# Patient Record
Sex: FEMALE | Race: WHITE | NOT HISPANIC OR LATINO | Employment: UNEMPLOYED | URBAN - METROPOLITAN AREA
[De-identification: names, ages, dates, MRNs, and addresses within clinical notes are randomized per-mention and may not be internally consistent; named-entity substitution may affect disease eponyms.]

---

## 2019-03-27 ENCOUNTER — TELEPHONE (OUTPATIENT)
Dept: SCHEDULING | Facility: IMAGING CENTER | Age: 24
End: 2019-03-27

## 2019-06-03 ENCOUNTER — HOSPITAL ENCOUNTER (OUTPATIENT)
Facility: MEDICAL CENTER | Age: 24
End: 2019-06-03
Attending: OBSTETRICS & GYNECOLOGY
Payer: COMMERCIAL

## 2019-06-03 ENCOUNTER — GYNECOLOGY VISIT (OUTPATIENT)
Dept: OBGYN | Facility: CLINIC | Age: 24
End: 2019-06-03
Payer: COMMERCIAL

## 2019-06-03 VITALS
HEIGHT: 66 IN | SYSTOLIC BLOOD PRESSURE: 124 MMHG | DIASTOLIC BLOOD PRESSURE: 80 MMHG | BODY MASS INDEX: 39.7 KG/M2 | WEIGHT: 247 LBS

## 2019-06-03 DIAGNOSIS — Z30.09 ENCOUNTER FOR OTHER GENERAL COUNSELING AND ADVICE ON CONTRACEPTION: ICD-10-CM

## 2019-06-03 DIAGNOSIS — Z12.4 CERVICAL CANCER SCREENING: ICD-10-CM

## 2019-06-03 PROCEDURE — 99203 OFFICE O/P NEW LOW 30 MIN: CPT | Performed by: OBSTETRICS & GYNECOLOGY

## 2019-06-03 PROCEDURE — 87491 CHLMYD TRACH DNA AMP PROBE: CPT

## 2019-06-03 PROCEDURE — 88175 CYTOPATH C/V AUTO FLUID REDO: CPT

## 2019-06-03 PROCEDURE — 87591 N.GONORRHOEAE DNA AMP PROB: CPT

## 2019-06-03 RX ORDER — VENLAFAXINE 50 MG/1
150 TABLET ORAL DAILY
COMMUNITY
End: 2019-06-27

## 2019-06-03 RX ORDER — ALPRAZOLAM 0.5 MG/1
0.5 TABLET ORAL NIGHTLY PRN
COMMUNITY
End: 2019-12-08

## 2019-06-03 NOTE — PROGRESS NOTES
Pt here for Mirena removal consult  Mirena was placed 2015  # [phone]  Pt would like to discuss having Nexplanon placed.

## 2019-06-03 NOTE — PROGRESS NOTES
GYN Consult    CC/reason for consult: wants Mirena removed and Nexplanon inserted    HPI: Vanessa Sanchez is a 24 y.o.  presents today because she would like her Mirena IUD removed and a Nexplanon placed.  She had her Mirena placed in .  She states she would like the Nexplanon instead because the Mirena insertion was very painful for her.  She is due for a Pap smear.  No history of abnormal Pap smears.  No history of STDs.  Her cycles have been much lighter with the Mirena.      ROS:  constitutional: denies fevers, general concerns  CV: denies chest pain, palpitations, edema  Resp: denies shortness of breath, cough  GI: denies abd pain, N/V, diarrhea/constipation, blood in stool  : denies irregular vaginal bleeding, discharge, pain, denies urinary complaints  Neuro: denies hx of migraines w/ aura  Endo: denies significant weight changes, temperature intolerance, denies hotflashes/nightsweats  Heme/lymph: denies easy bleeding/bruising, denies swollen glands  Psych: denies concerns about mood, denies SI  Allergy: denies concerns      OB History   No data available       Past Medical History:   Diagnosis Date   • Anxiety    • Asthma    • Borderline personality disorder (HCC)    • Depression    • Urinary tract infection        Past Surgical History:   Procedure Laterality Date   • TONSILLECTOMY AND ADENOIDECTOMY         Medications:   Current Outpatient Prescriptions Ordered in [facility]   Medication Sig Dispense Refill   • venlafaxine (EFFEXOR) 50 MG tablet Take 150 mg by mouth every day.     • ALPRAZolam (XANAX) 0.5 MG Tab Take 0.5 mg by mouth at bedtime as needed for Sleep.     • levothyroxine (SYNTHROID) 100 MCG TABS Take 100 mcg by mouth every day.     • Levonorgest-Eth Estrad -Day (SEASONIQUE) 0.15-0.03 &0.01 MG TABS Take  by mouth every 24 hours.       No current [facility]-ordered facility-administered medications on file.        Allergies: Patient has no known allergies.    Social History  "    Social History   • Marital status: Single     Spouse name: N/A   • Number of children: N/A   • Years of education: N/A     Social History Main Topics   • Smoking status: Never Smoker   • Smokeless tobacco: Never Used      Comment: occass   • Alcohol use No      Comment: occass   • Drug use: Yes     Types: Marijuana      Comment: last smoked yesterday   • Sexual activity: Yes     Partners: Male     Birth control/ protection: IUD     Other Topics Concern   • Not on file     Social History Narrative   • No narrative on file       Family History   Problem Relation Age of Onset   • Diabetes Mother    • Stroke Mother    • Other Mother         fibromyalgia   • No Known Problems Father    • Diabetes Maternal Grandmother    • Hyperlipidemia Maternal Grandmother    • No Known Problems Sister      Denies hx of GI/GYN/breast cancers    Physical Exam:  /80   Ht 1.676 m (5' 6\")   Wt 112 kg (247 lb)   LMP 05/25/2019   BMI 39.87 kg/m²   gen: AAO, NAD, affect appropriate  Neck: non-tender, no masses, no thyromegaly/nodules appreciated  CV: RRR; no LE edema  Resp: Symmetric non labored breathing, CTAB  Abd: soft, NT, ND, no masses, no organomegaly, no hernias  : NEFG, normal urethral meatus, normal anus/perineum, normal vagina and cervix.  IUD strings not visualized. Uterus midline, anteverted, no adnexal masses/tenderness  Skin: warm/dry, no lesions    A/P: 24 y.o. No obstetric history on file. with desire for Nexplanon insertion and Mirena removal.  Today we discussed that risk of Nexplanon includes abnormal uterine bleeding, a failure rate of approximately 1%, and pain at insertion site.  We also discussed the possibility of Nexplanon migration in the arm.  Also, during exam the Mirena IUD strings were not visualized.  We did discuss that we have tools that we can use in the office to attempt to remove it in the office.  However, if we are unable to do in the office we will need to take her to the operating room " for hysteroscopic removal.  I did not order an ultrasound to confirm IUD placement in the uterus today because of her statement that her periods lightened up substantially after IUD insertion.  If IUD is not removed in office, I would recommend an ultrasound to confirm intrauterine placement.  1. Cervical cancer screening  THINPREP RFLX HPV ASCUS W/CTNG   2. Encounter for other general counseling and advice on contraception  REFERRAL TO OB/GYN

## 2019-06-04 DIAGNOSIS — Z12.4 CERVICAL CANCER SCREENING: ICD-10-CM

## 2019-06-06 LAB
C TRACH DNA GENITAL QL NAA+PROBE: NEGATIVE
CYTOLOGY REG CYTOL: NORMAL
N GONORRHOEA DNA GENITAL QL NAA+PROBE: NEGATIVE
SPECIMEN SOURCE: NORMAL

## 2019-06-18 ENCOUNTER — GYNECOLOGY VISIT (OUTPATIENT)
Dept: OBGYN | Facility: CLINIC | Age: 24
End: 2019-06-18
Payer: COMMERCIAL

## 2019-06-18 VITALS
HEIGHT: 66 IN | BODY MASS INDEX: 39.53 KG/M2 | WEIGHT: 246 LBS | DIASTOLIC BLOOD PRESSURE: 66 MMHG | SYSTOLIC BLOOD PRESSURE: 116 MMHG

## 2019-06-18 DIAGNOSIS — Z30.432 ENCOUNTER FOR IUD REMOVAL: ICD-10-CM

## 2019-06-18 DIAGNOSIS — Z30.017 NEXPLANON INSERTION: ICD-10-CM

## 2019-06-18 DIAGNOSIS — N88.2 CERVICAL STENOSIS (UTERINE CERVIX): ICD-10-CM

## 2019-06-18 PROCEDURE — 11981 INSERTION DRUG DLVR IMPLANT: CPT | Mod: 59 | Performed by: OBSTETRICS & GYNECOLOGY

## 2019-06-18 PROCEDURE — 58301 REMOVE INTRAUTERINE DEVICE: CPT | Performed by: OBSTETRICS & GYNECOLOGY

## 2019-06-18 RX ORDER — LORAZEPAM 1 MG/1
0.5 TABLET ORAL
Qty: 2 TAB | Refills: 0 | Status: SHIPPED | OUTPATIENT
Start: 2019-06-18 | End: 2019-06-19

## 2019-06-18 RX ORDER — MISOPROSTOL 200 UG/1
400 TABLET ORAL ONCE
Qty: 2 TAB | Refills: 0 | Status: SHIPPED | OUTPATIENT
Start: 2019-06-18 | End: 2019-06-18

## 2019-06-18 NOTE — LETTER
June 18, 2019    To Whom it may concern:         Vanessa Puckett Daniel was seeing today 06/18/2019 for a doctor's appointment.  Please excuse her absence.      Thank you,          Josette Smalls D.O.

## 2019-06-18 NOTE — LETTER
June 18, 2019      To Whom in may concern:      Vanessa Sanchez was seen today 06/18/2019 for a doctor's appointment.   Please excuse her absence.    Thank you,          Josette Smalls D.O.

## 2019-06-18 NOTE — PROCEDURES
Procedure:   She was counseled regarding risks and benefits of Nexplanon including irregular bleeding and method failure. She was also counseled regarding risk of placement including bleeding, infection, scaring and difficult removal. After her questions were answered to her satisfaction, procedural consent was signed.     She was placed in a supine position. The (left) arm was gently adducted.   Area over bicipital groove 8-10cm proximal to medial epicondyl was prepped with alcohol swab. The area was then injected with 3 cc 1% plain lidocaine. The medial surface of the arm was then prepped with betadine x 3. The Nexplanon (lot #[id number]) was then placed in the usual fashion without difficulty. The device was easily palpable. The incision site was made hemostatic with pressure and then dressed with a band aid and gauze wrap. She tolerated this well without complication.    Attempted IUD removal:    Bimanual exam performed revealing anteverted uterus.  Speculum placed in the vagina and cervix visualized. IUD strings not able to be seen. TUVS performed which visualized IUD within uterine cavity.  Brush and alligator clamps were used to sweep strings out of cervical canal with no success.  Tenaculum placed with significant patient discomfort experienced.  Patient desired to proceed.  Further attempts to grasp strings were not successful and patient desired cessation of procedure.      Plan: IUD was visualized to be intrauterine on transvaginal ultrasound performed today.  Patient desires to proceed with another attempt at removal in the office prior to going to the OR for removal.  I think this is reasonable but we will plan to give patient preoperative medications.  I have instructed her to take 800 mg of ibuprofen about an hour prior to the procedure as well as half tab of Ativan 30 minutes prior to procedure.  I have written a prescription for her to have Ativan which she will take half milligrams 30 minutes prior to  the procedure and then bring the other tabs with her so she can take more at the time of procedure if necessary.  We will also plan for paracervical block and I have given a prescription for misoprostol and instructed her to take 400 mcg 4 hours prior to procedure.  She will schedule a return appointment for IUD removal in 1 to 2 weeks.

## 2019-06-18 NOTE — PROGRESS NOTES
GYN visit for IUD Removal & Nexplanon Insertion  LMP: 06/17/2019  Negative UPT today 06/18/2019, performed by jessica Donahue (MA)  WT: 246 lb  BP: 116/66  Good # [phone]

## 2019-06-24 ENCOUNTER — GYNECOLOGY VISIT (OUTPATIENT)
Dept: OBGYN | Facility: CLINIC | Age: 24
End: 2019-06-24
Payer: COMMERCIAL

## 2019-06-24 VITALS — SYSTOLIC BLOOD PRESSURE: 118 MMHG | BODY MASS INDEX: 39.87 KG/M2 | WEIGHT: 247 LBS | DIASTOLIC BLOOD PRESSURE: 70 MMHG

## 2019-06-24 DIAGNOSIS — T83.32XD INTRAUTERINE CONTRACEPTIVE DEVICE THREADS LOST, SUBSEQUENT ENCOUNTER: ICD-10-CM

## 2019-06-24 NOTE — PROCEDURES
Procedure note-Mirena IUD removal;    Patient is to have Mirena IUD placed at [facility] 4.5 years    Patient had Nexplanon placed approximately 1 week ago    Patient had attempted removal of IUD which was unsuccessful approximately 1 week ago    Informed consent obtained-discussed risk of uterine perforation and infection    Bimanual exam reveals anteverted uterus    Vaginal speculum placed    Betadine prep to cervix and vagina    Single-tooth tenaculum placed on the anterior lip of the cervix    Uterus sounded to 7.5 cm in anterior    Endocervical brush placed into the endocervix to try to retrieve IUD strings but unsuccessful    Naomi forceps placed into the endometrial cavity attempted to retrieve IUD but unsuccessful    Alligator forceps placed into the endometrial cavity to attempt to remove IUD but also unsuccessful.    Transvaginal ultrasound performed by myself and read by myself reveals IUD present at the fundus the uterus does not appear to be protruding into the myometrium    Plan; needs D&C in the operating room-referral placed

## 2019-06-27 ENCOUNTER — OFFICE VISIT (OUTPATIENT)
Dept: MEDICAL GROUP | Facility: MEDICAL CENTER | Age: 24
End: 2019-06-27
Attending: FAMILY MEDICINE
Payer: COMMERCIAL

## 2019-06-27 ENCOUNTER — HOSPITAL ENCOUNTER (OUTPATIENT)
Facility: MEDICAL CENTER | Age: 24
End: 2019-06-27
Attending: INTERNAL MEDICINE
Payer: COMMERCIAL

## 2019-06-27 VITALS
RESPIRATION RATE: 16 BRPM | DIASTOLIC BLOOD PRESSURE: 90 MMHG | WEIGHT: 244 LBS | HEART RATE: 60 BPM | SYSTOLIC BLOOD PRESSURE: 130 MMHG | HEIGHT: 66 IN | BODY MASS INDEX: 39.21 KG/M2 | OXYGEN SATURATION: 98 % | TEMPERATURE: 98 F

## 2019-06-27 DIAGNOSIS — E03.9 HYPOTHYROIDISM, UNSPECIFIED TYPE: ICD-10-CM

## 2019-06-27 DIAGNOSIS — F90.9 ATTENTION DEFICIT HYPERACTIVITY DISORDER (ADHD), UNSPECIFIED ADHD TYPE: ICD-10-CM

## 2019-06-27 DIAGNOSIS — F60.3 BORDERLINE PERSONALITY DISORDER (HCC): ICD-10-CM

## 2019-06-27 DIAGNOSIS — L30.1 DYSHIDROTIC DERMATITIS: ICD-10-CM

## 2019-06-27 DIAGNOSIS — F39 MOOD DISORDER (HCC): ICD-10-CM

## 2019-06-27 PROCEDURE — 99202 OFFICE O/P NEW SF 15 MIN: CPT | Performed by: FAMILY MEDICINE

## 2019-06-27 PROCEDURE — 99204 OFFICE O/P NEW MOD 45 MIN: CPT | Performed by: FAMILY MEDICINE

## 2019-06-27 PROCEDURE — 80307 DRUG TEST PRSMV CHEM ANLYZR: CPT

## 2019-06-27 RX ORDER — BETAMETHASONE DIPROPIONATE 0.5 MG/G
1 CREAM TOPICAL 2 TIMES DAILY
Qty: 1 TUBE | Refills: 0 | Status: SHIPPED | OUTPATIENT
Start: 2019-06-27 | End: 2019-11-26

## 2019-06-27 RX ORDER — VENLAFAXINE HYDROCHLORIDE 150 MG/1
150 CAPSULE, EXTENDED RELEASE ORAL DAILY
Qty: 30 CAP | Refills: 3 | Status: SHIPPED | OUTPATIENT
Start: 2019-06-27 | End: 2020-06-15 | Stop reason: SDUPTHER

## 2019-06-27 RX ORDER — METHYLPHENIDATE HYDROCHLORIDE 10 MG/1
10 TABLET ORAL DAILY
Qty: 30 TAB | Refills: 0 | Status: SHIPPED | OUTPATIENT
Start: 2019-06-27 | End: 2019-07-27

## 2019-06-27 ASSESSMENT — ENCOUNTER SYMPTOMS
SPUTUM PRODUCTION: 0
DEPRESSION: 1
ORTHOPNEA: 0
NAUSEA: 0
CHILLS: 0
PALPITATIONS: 0
FEVER: 0
MUSCULOSKELETAL NEGATIVE: 1
VOMITING: 0
INSOMNIA: 0
NEUROLOGICAL NEGATIVE: 1
NERVOUS/ANXIOUS: 1
COUGH: 0
ABDOMINAL PAIN: 0
SHORTNESS OF BREATH: 0
HALLUCINATIONS: 0

## 2019-06-27 ASSESSMENT — LIFESTYLE VARIABLES: SUBSTANCE_ABUSE: 0

## 2019-06-27 NOTE — PROGRESS NOTES
Subjective:      Vanessa Sanchez is a 24 y.o. female who presents with [facility]            Patient 24-year-old female here to establish with the clinic today.  She has a past history of mood disorder, borderline personality disorder, ADHD, hypothyroidism, BMI greater than 31, and dyshidrotic eczema.    She had been seeing a psychiatrist but recently her psychiatry office has closed down and the doctors moved out of town.  She states that since that happened she has not been able to have her medications regularly filled.  She has a history of mood disorder with anxiety primarily and panic attacks, borderline personality disorder and had been in the process of being worked up for adult ADHD.  Today she was asked to fill out a Balwinder scale which suggest that she may actually have ADHD.  She had been on Ritalin 10 mg once daily which she felt helped her concentrate better in school.  We will have her continue to take her Ritalin as previously directed.  We will also refill her Effexor as well as refer her back to psychiatry for additional assistance in management of her multiple conditions.  She is not having any urges to harm himself or others.  She has been advised if she has any sudden worsening of her current symptoms or the urge to harm her self or others she should go to the emergency room for further assistance and management.    She states that she had been on thyroid medication in the past but is no longer taking it.  She has not had her thyroid hormone checked since.  Will reorder a TSH and T4 level to reassess her thyroid function.  We will continue to follow.    She also has a history of asthma that had been previously diagnosed as exercise-induced and has an inhaler which she has not been using.  She states that occasionally when she rides her bike she may have an attack but then she uses her inhaler and things resolved.  She is no longer smoking marijuana since she did see that it did worsen her  "asthma.  We will have her continue to use her inhaler as needed and will continue to follow.    She also has skin rash that she describes as blisters on the tips of her fingers and toes that tend to occur randomly usually in warmer weather.  She states that after these blisters pop her skin gets very dry and starts to crack.  We will have her use a steroid cream over the affected areas were no longer than a week at a time.  After using the steroid cream she has been advised to use a very strong moisturizer to soften her skin around her fingers and toes.  If this does not work a referral to dermatology will be made for a further assessment of her skin rash.     Her past surgical history includes a tonsillectomy and adenoidectomy.    Her family medical history is significant for diabetes.    She is currently a student.    She denies smoking tobacco, drinks alcohol occasionally, and is a former marijuana user.        Review of Systems   Constitutional: Negative for chills and fever.   HENT: Negative for hearing loss and tinnitus.    Respiratory: Negative for cough, sputum production and shortness of breath.    Cardiovascular: Negative for chest pain, palpitations and orthopnea.   Gastrointestinal: Negative for abdominal pain, nausea and vomiting.   Musculoskeletal: Negative.    Skin: Positive for itching and rash.   Neurological: Negative.    Psychiatric/Behavioral: Positive for depression. Negative for hallucinations, substance abuse and suicidal ideas. The patient is nervous/anxious. The patient does not have insomnia.           Objective:     /90 (BP Location: Left arm, Patient Position: Sitting)   Pulse 60   Temp 36.7 °C (98 °F)   Resp 16   Ht 1.676 m (5' 6\")   Wt 110.7 kg (244 lb)   LMP 06/17/2019 (Exact Date)   SpO2 98%   BMI 39.38 kg/m²      Physical Exam   Constitutional: She is oriented to person, place, and time.   BMI 39.98   HENT:   Head: Normocephalic and atraumatic.   Nose: Nose normal. "   Mouth/Throat: Oropharynx is clear and moist.   Eyes: Pupils are equal, round, and reactive to light. Conjunctivae and EOM are normal.   Neck: Normal range of motion. Neck supple. No thyromegaly present.   Cardiovascular: Normal rate, regular rhythm and normal heart sounds.  Exam reveals no friction rub.    No murmur heard.  Pulmonary/Chest: Effort normal and breath sounds normal. No respiratory distress. She has no wheezes. She has no rales.   Abdominal: Soft. Bowel sounds are normal. She exhibits no distension. There is no tenderness.   Lymphadenopathy:     She has no cervical adenopathy.   Neurological: She is alert and oriented to person, place, and time.   Skin: Skin is warm and dry.   Psychiatric: She has a normal mood and affect. Her behavior is normal.   Nursing note and vitals reviewed.              Assessment/Plan:     1. Mood disorder (HCC)  We will have her continue to use her medications as previously directed by her mental health provider.  She has been to be referred to psychiatry for additional assistance in medical management.  She has any sudden worsening of her current symptoms she has been advised to go to the emergency room for additional assistance and management.  - REFERRAL TO PSYCHIATRY  - Comp Metabolic Panel; Future  - VITAMIN D,25 HYDROXY; Future    2. Borderline personality disorder (HCC)  See above plan.  - REFERRAL TO PSYCHIATRY    3. BMI 31.0-31.9,adult  We will have her continue to watch her diet and exercise as tolerated.  - Patient identified as having weight management issue.  Appropriate orders and counseling given.  - Comp Metabolic Panel; Future  - CBC WITH DIFFERENTIAL; Future    4. Hypothyroidism, unspecified type  We will recheck her TSH and T4 levels.  We will continue to follow.  - TSH WITH REFLEX TO FT4; Future  - CBC WITH DIFFERENTIAL; Future  - VITAMIN D,25 HYDROXY; Future    5. Attention deficit hyperactivity disorder (ADHD), unspecified ADHD type  Balwinder scale was  given for patient to fill out.  Ritalin 10 mg daily has been given to patient to start using as directed.  We will continue to follow.  - methylphenidate (RITALIN) 10 MG Tab; Take 1 Tab by mouth every day for 30 days.  Dispense: 30 Tab; Refill: 0    6. Dyshidrotic dermatitis  We will have her try a steroid ointment on her fingers to help treat the rash.  She will also use moisturizers to continue moisturizing her skin.  We will continue to follow.  - betamethasone dipropionate (DIPROLENE) 0.05 % Cream; Apply 1 Application to affected area(s) 2 times a day.  Dispense: 1 Tube; Refill: 0

## 2019-06-28 DIAGNOSIS — Z79.899 CHRONIC PRESCRIPTION BENZODIAZEPINE USE: ICD-10-CM

## 2019-06-28 DIAGNOSIS — F90.9 ATTENTION DEFICIT HYPERACTIVITY DISORDER (ADHD), UNSPECIFIED ADHD TYPE: ICD-10-CM

## 2019-06-30 LAB
AMPHET CTO UR CFM-MCNC: NEGATIVE NG/ML
BARBITURATES CTO UR CFM-MCNC: NEGATIVE NG/ML
BENZODIAZ CTO UR CFM-MCNC: NEGATIVE NG/ML
BUPRENORPHINE UR-MCNC: NEGATIVE NG/ML
CANNABINOIDS CTO UR CFM-MCNC: POSITIVE NG/ML
CARISOPRODOL UR-MCNC: NEGATIVE NG/ML
COCAINE CTO UR CFM-MCNC: NEGATIVE NG/ML
DRUG SCREEN COMMENT UR-IMP: NORMAL
ETHYL GLUCURONIDE UR QL SCN: NEGATIVE NG/ML
FENTANYL UR-MCNC: NEGATIVE NG/ML
MEPERIDINE CTO UR SCN-MCNC: NEGATIVE NG/ML
METHADONE CTO UR CFM-MCNC: NEGATIVE NG/ML
OPIATES UR QL SCN: NEGATIVE NG/ML
OXYCDOXYM URSCRN 2005102: NEGATIVE NG/ML
PCP CTO UR CFM-MCNC: NEGATIVE NG/ML
PROPOXYPH CTO UR CFM-MCNC: NEGATIVE NG/ML
TAPENTADOL UR-MCNC: NEGATIVE NG/ML
TRAMADOL CTO UR SCN-MCNC: NEGATIVE NG/ML
ZOLPIDEM UR-MCNC: NEGATIVE NG/ML

## 2019-07-05 LAB — THC UR CFM-MCNC: 352 NG/ML

## 2019-07-23 ENCOUNTER — GYNECOLOGY VISIT (OUTPATIENT)
Dept: OBGYN | Facility: CLINIC | Age: 24
End: 2019-07-23
Payer: COMMERCIAL

## 2019-07-23 VITALS
BODY MASS INDEX: 39.05 KG/M2 | SYSTOLIC BLOOD PRESSURE: 122 MMHG | DIASTOLIC BLOOD PRESSURE: 72 MMHG | HEIGHT: 66 IN | WEIGHT: 243 LBS

## 2019-07-23 DIAGNOSIS — T83.32XA INTRAUTERINE CONTRACEPTIVE DEVICE THREADS LOST, INITIAL ENCOUNTER: ICD-10-CM

## 2019-07-23 DIAGNOSIS — Z53.8 ATTEMPTED IUD REMOVAL, UNSUCCESSFUL: ICD-10-CM

## 2019-07-23 DIAGNOSIS — Z97.5 ATTEMPTED IUD REMOVAL, UNSUCCESSFUL: ICD-10-CM

## 2019-07-23 NOTE — PROGRESS NOTES
GYN Pre-Op    CC: retained IUD      HPI: Vanessa Sanchez is a 24 y.o. G0 here or preop for hysteroscopic removal of retained Mirena IUD.  Patient has had 2 attempts at removal here in the office without success.  Has had ultrasounds confirming intrauterine location.  Declines additional attempt at ultrasound-guided removal today given discomfort with 2 prior removal attempts.    Patient has a Nexplanon in place for active contraception.    ROS:  constitutional: denies fevers, general concerns  CV: denies chest pain, palpitations, edema  Resp: denies shortness of breath, cough  GI: denies abd pain, N/V, diarrhea/constipatin, blood in stoolo  : denies irregular vaginal bleeding, discharge, pain, denies urinary complaints  Neuro: denies HAs, numbness/weakness  Endo: denies significant weight changes, irregular menses, temperature intolerance, denies hotflashes/nightsweats  Heme/lymph: denies hx of blood transfusion, easy bleeding/bruising, denies swollen glands  Psych: hx of depression; reports well controlled, no SI  Allergy: reports seasonal allergies    OB History    Para Term  AB Living   0 0 0 0 0 0   SAB TAB Ectopic Molar Multiple Live Births   0 0 0 0 0 0             GYN Hx:   Denies hx of STIs  Hx of HPV on pap, cleared; last normal 2019    Past Medical History:   Diagnosis Date   • Anxiety    • Asthma    • Attention deficit hyperactivity disorder (ADHD) 2019   • Borderline personality disorder (HCC)    • Depression    • Hypothyroidism 2019   • Sleep apnea        Past Surgical History:   Procedure Laterality Date   • TONSILLECTOMY AND ADENOIDECTOMY     nasal surgery (turbinates in nose reduced)    Medications:   vanlafaxine  Ritalin  nexplanon  Uses JAH machine at night    Allergies: Patient has no known allergies.    Social History     Social History Main Topics   • Smoking status: Never Smoker   • Smokeless tobacco: Never Used      Comment: occass   • Alcohol use No      " Comment: occass   • Drug use: Yes     Types: Marijuana      Comment: last smoked yesterday   Every day smokes weed      Family History   Problem Relation Age of Onset   • Diabetes Mother    • Stroke Mother    • Other Mother         fibromyalgia   • No Known Problems Father    • Diabetes Maternal Grandmother    • Hyperlipidemia Maternal Grandmother    • No Known Problems Sister    [facility]GG[facility] breast cancer    Physical Exam:  /72 (BP Location: Right arm, Patient Position: Sitting)   Ht 1.676 m (5' 6\")   Wt 110.2 kg (243 lb)   BMI 39.22 kg/m²   gen: AAO, NAD, affect appropriate  CV: RRR; no LE edema  resp: ctab  abd: soft, NT, ND, no masses, no organomegaly, no hernias  : deferred  Skin: warm/dry, no lesions    A/P: 24 y.o. G0 with retained Mirena IUD status post failed attempt at removal in the office previously  1. Intrauterine contraceptive device threads lost, initial encounter     2. Attempted IUD removal, unsuccessful     Plan for hysteroscopy, IUD removal in the operating room.  Discussed may attempt removal in the operating room with anesthesia prior to actual hysteroscopy as sometimes we are able to remove it prior to inserting the camera when patients are relaxed.    Hx of hypothyroidism: not on meds.  Advised to get TSH drawn (last check normal) asap to ensure her thyroid function is relatively normal prior to surgery.    I discussed w/ pt risks of surgery including pain, bleeding, infection, risk of uterine perforation.  Discussed risk of damage to surrounding structures including bladder/rectum.  Pt confirms she is accepting of blood transfusion in case of emergency.  Reviewed risks of transfusion including transfusion reaction (fever, damage to heart/lungs/kidneys), risk of exposure to infectious disease including HIV and hepatitis.  All questions answered.  Consent to be signed on day of surgery.    To have nothing to eat after midnight day of surgery.  Currently schedule his first case of the day.  " Aware of checking time 05 30.  Discussed in general to avoid NSAIDs the week before surgery, although she is a very little bit low risk of bleeding during her procedure so this is less essential for her.    Discussed typical recovery, plan for same day discharge.    Patient start school 2 days after procedure.  Discussed she should be fine to do this, may have some mild cramping for which she can take over-the-counter NSAIDs.  Patient strongly desires to move up of surgical date if possible.  Reports that she would be available even on the next day basis of a slot did become available.      Natividad Bonds MD  [facility], Women's Health

## 2019-07-23 NOTE — NON-PROVIDER
Pre op visit. Surgery scheduled for 8/19/19.  nexplanon placed 6/2019  Phone   Pharmacy verified  C/o cramping

## 2019-07-29 ENCOUNTER — ANESTHESIA (OUTPATIENT)
Dept: SURGERY | Facility: MEDICAL CENTER | Age: 24
End: 2019-07-29
Payer: COMMERCIAL

## 2019-07-29 ENCOUNTER — HOSPITAL ENCOUNTER (OUTPATIENT)
Facility: MEDICAL CENTER | Age: 24
End: 2019-07-29
Attending: OBSTETRICS & GYNECOLOGY | Admitting: OBSTETRICS & GYNECOLOGY
Payer: COMMERCIAL

## 2019-07-29 ENCOUNTER — ANESTHESIA EVENT (OUTPATIENT)
Dept: SURGERY | Facility: MEDICAL CENTER | Age: 24
End: 2019-07-29
Payer: COMMERCIAL

## 2019-07-29 VITALS
DIASTOLIC BLOOD PRESSURE: 90 MMHG | SYSTOLIC BLOOD PRESSURE: 138 MMHG | WEIGHT: 240.74 LBS | BODY MASS INDEX: 38.69 KG/M2 | HEIGHT: 66 IN | OXYGEN SATURATION: 96 % | HEART RATE: 66 BPM | TEMPERATURE: 97.5 F | RESPIRATION RATE: 14 BRPM

## 2019-07-29 LAB
B-HCG FREE SERPL-ACNC: <5 MIU/ML
IHCGL IHCGL: NEGATIVE MIU/ML

## 2019-07-29 PROCEDURE — 160009 HCHG ANES TIME/MIN: Performed by: OBSTETRICS & GYNECOLOGY

## 2019-07-29 PROCEDURE — 700111 HCHG RX REV CODE 636 W/ 250 OVERRIDE (IP): Performed by: ANESTHESIOLOGY

## 2019-07-29 PROCEDURE — 700105 HCHG RX REV CODE 258: Performed by: OBSTETRICS & GYNECOLOGY

## 2019-07-29 PROCEDURE — 160046 HCHG PACU - 1ST 60 MINS PHASE II: Performed by: OBSTETRICS & GYNECOLOGY

## 2019-07-29 PROCEDURE — 160039 HCHG SURGERY MINUTES - EA ADDL 1 MIN LEVEL 3: Performed by: OBSTETRICS & GYNECOLOGY

## 2019-07-29 PROCEDURE — 700101 HCHG RX REV CODE 250: Performed by: OBSTETRICS & GYNECOLOGY

## 2019-07-29 PROCEDURE — 160035 HCHG PACU - 1ST 60 MINS PHASE I: Performed by: OBSTETRICS & GYNECOLOGY

## 2019-07-29 PROCEDURE — 700102 HCHG RX REV CODE 250 W/ 637 OVERRIDE(OP): Performed by: ANESTHESIOLOGY

## 2019-07-29 PROCEDURE — 160025 RECOVERY II MINUTES (STATS): Performed by: OBSTETRICS & GYNECOLOGY

## 2019-07-29 PROCEDURE — 58562 HYSTEROSCOPY REMOVE FB: CPT | Performed by: OBSTETRICS & GYNECOLOGY

## 2019-07-29 PROCEDURE — 502587 HCHG PACK, D&C: Performed by: OBSTETRICS & GYNECOLOGY

## 2019-07-29 PROCEDURE — 160048 HCHG OR STATISTICAL LEVEL 1-5: Performed by: OBSTETRICS & GYNECOLOGY

## 2019-07-29 PROCEDURE — 700101 HCHG RX REV CODE 250: Performed by: ANESTHESIOLOGY

## 2019-07-29 PROCEDURE — 160002 HCHG RECOVERY MINUTES (STAT): Performed by: OBSTETRICS & GYNECOLOGY

## 2019-07-29 PROCEDURE — A9270 NON-COVERED ITEM OR SERVICE: HCPCS | Performed by: ANESTHESIOLOGY

## 2019-07-29 PROCEDURE — 84702 CHORIONIC GONADOTROPIN TEST: CPT

## 2019-07-29 PROCEDURE — 160028 HCHG SURGERY MINUTES - 1ST 30 MINS LEVEL 3: Performed by: OBSTETRICS & GYNECOLOGY

## 2019-07-29 RX ORDER — HYDROMORPHONE HYDROCHLORIDE 1 MG/ML
0.1 INJECTION, SOLUTION INTRAMUSCULAR; INTRAVENOUS; SUBCUTANEOUS
Status: DISCONTINUED | OUTPATIENT
Start: 2019-07-29 | End: 2019-07-29 | Stop reason: HOSPADM

## 2019-07-29 RX ORDER — OXYCODONE HCL 5 MG/5 ML
10 SOLUTION, ORAL ORAL
Status: COMPLETED | OUTPATIENT
Start: 2019-07-29 | End: 2019-07-29

## 2019-07-29 RX ORDER — OXYCODONE HCL 5 MG/5 ML
5 SOLUTION, ORAL ORAL
Status: COMPLETED | OUTPATIENT
Start: 2019-07-29 | End: 2019-07-29

## 2019-07-29 RX ORDER — ONDANSETRON 2 MG/ML
4 INJECTION INTRAMUSCULAR; INTRAVENOUS
Status: DISCONTINUED | OUTPATIENT
Start: 2019-07-29 | End: 2019-07-29 | Stop reason: HOSPADM

## 2019-07-29 RX ORDER — SODIUM CHLORIDE, SODIUM LACTATE, POTASSIUM CHLORIDE, CALCIUM CHLORIDE 600; 310; 30; 20 MG/100ML; MG/100ML; MG/100ML; MG/100ML
INJECTION, SOLUTION INTRAVENOUS CONTINUOUS
Status: DISCONTINUED | OUTPATIENT
Start: 2019-07-29 | End: 2019-07-29 | Stop reason: HOSPADM

## 2019-07-29 RX ORDER — SODIUM CHLORIDE, SODIUM LACTATE, POTASSIUM CHLORIDE, CALCIUM CHLORIDE 600; 310; 30; 20 MG/100ML; MG/100ML; MG/100ML; MG/100ML
INJECTION, SOLUTION INTRAVENOUS CONTINUOUS
Status: DISCONTINUED | OUTPATIENT
Start: 2019-07-29 | End: 2019-07-29

## 2019-07-29 RX ORDER — HYDROMORPHONE HYDROCHLORIDE 1 MG/ML
0.4 INJECTION, SOLUTION INTRAMUSCULAR; INTRAVENOUS; SUBCUTANEOUS
Status: DISCONTINUED | OUTPATIENT
Start: 2019-07-29 | End: 2019-07-29 | Stop reason: HOSPADM

## 2019-07-29 RX ORDER — METHYLPHENIDATE HYDROCHLORIDE 5 MG/1
10 TABLET ORAL 2 TIMES DAILY
COMMUNITY
End: 2019-12-08

## 2019-07-29 RX ORDER — HYDROMORPHONE HYDROCHLORIDE 1 MG/ML
0.2 INJECTION, SOLUTION INTRAMUSCULAR; INTRAVENOUS; SUBCUTANEOUS
Status: DISCONTINUED | OUTPATIENT
Start: 2019-07-29 | End: 2019-07-29 | Stop reason: HOSPADM

## 2019-07-29 RX ORDER — HALOPERIDOL 5 MG/ML
1 INJECTION INTRAMUSCULAR
Status: DISCONTINUED | OUTPATIENT
Start: 2019-07-29 | End: 2019-07-29 | Stop reason: HOSPADM

## 2019-07-29 RX ORDER — DIPHENHYDRAMINE HYDROCHLORIDE 50 MG/ML
12.5 INJECTION INTRAMUSCULAR; INTRAVENOUS
Status: DISCONTINUED | OUTPATIENT
Start: 2019-07-29 | End: 2019-07-29 | Stop reason: HOSPADM

## 2019-07-29 RX ORDER — MEPERIDINE HYDROCHLORIDE 25 MG/ML
12.5 INJECTION INTRAMUSCULAR; INTRAVENOUS; SUBCUTANEOUS
Status: DISCONTINUED | OUTPATIENT
Start: 2019-07-29 | End: 2019-07-29 | Stop reason: HOSPADM

## 2019-07-29 RX ADMIN — FENTANYL CITRATE 50 MCG: 50 INJECTION, SOLUTION INTRAMUSCULAR; INTRAVENOUS at 12:27

## 2019-07-29 RX ADMIN — OXYCODONE HYDROCHLORIDE 10 MG: 5 SOLUTION ORAL at 13:41

## 2019-07-29 RX ADMIN — FENTANYL CITRATE 25 MCG: 50 INJECTION, SOLUTION INTRAMUSCULAR; INTRAVENOUS at 12:59

## 2019-07-29 RX ADMIN — FENTANYL CITRATE 25 MCG: 50 INJECTION, SOLUTION INTRAMUSCULAR; INTRAVENOUS at 12:45

## 2019-07-29 RX ADMIN — FENTANYL CITRATE 25 MCG: 0.05 INJECTION, SOLUTION INTRAMUSCULAR; INTRAVENOUS at 13:59

## 2019-07-29 RX ADMIN — SODIUM CHLORIDE, POTASSIUM CHLORIDE, SODIUM LACTATE AND CALCIUM CHLORIDE: 600; 310; 30; 20 INJECTION, SOLUTION INTRAVENOUS at 12:27

## 2019-07-29 RX ADMIN — MIDAZOLAM HYDROCHLORIDE 2 MG: 1 INJECTION, SOLUTION INTRAMUSCULAR; INTRAVENOUS at 12:27

## 2019-07-29 RX ADMIN — FENTANYL CITRATE 50 MCG: 50 INJECTION, SOLUTION INTRAMUSCULAR; INTRAVENOUS at 12:37

## 2019-07-29 RX ADMIN — FENTANYL CITRATE 25 MCG: 50 INJECTION, SOLUTION INTRAMUSCULAR; INTRAVENOUS at 12:47

## 2019-07-29 RX ADMIN — PROPOFOL 180 MG: 10 INJECTION, EMULSION INTRAVENOUS at 12:37

## 2019-07-29 RX ADMIN — SODIUM CHLORIDE, POTASSIUM CHLORIDE, SODIUM LACTATE AND CALCIUM CHLORIDE: 600; 310; 30; 20 INJECTION, SOLUTION INTRAVENOUS at 12:00

## 2019-07-29 RX ADMIN — FENTANYL CITRATE 25 MCG: 0.05 INJECTION, SOLUTION INTRAMUSCULAR; INTRAVENOUS at 02:00

## 2019-07-29 RX ADMIN — FENTANYL CITRATE 50 MCG: 50 INJECTION, SOLUTION INTRAMUSCULAR; INTRAVENOUS at 12:40

## 2019-07-29 RX ADMIN — FENTANYL CITRATE 25 MCG: 0.05 INJECTION, SOLUTION INTRAMUSCULAR; INTRAVENOUS at 13:52

## 2019-07-29 RX ADMIN — FENTANYL CITRATE 25 MCG: 0.05 INJECTION, SOLUTION INTRAMUSCULAR; INTRAVENOUS at 13:40

## 2019-07-29 RX ADMIN — FENTANYL CITRATE 25 MCG: 50 INJECTION, SOLUTION INTRAMUSCULAR; INTRAVENOUS at 13:04

## 2019-07-29 RX ADMIN — FENTANYL CITRATE 25 MCG: 0.05 INJECTION, SOLUTION INTRAMUSCULAR; INTRAVENOUS at 14:07

## 2019-07-29 ASSESSMENT — PAIN SCALES - GENERAL: PAIN_LEVEL: 1

## 2019-07-29 NOTE — ANESTHESIA POSTPROCEDURE EVALUATION
Patient: Vanessa Sanchez    Procedure Summary     Date:  07/29/19 Room / Location:  [facility] ROOM 21 / SURGERY SAME DAY [facility]    Anesthesia Start:  1227 Anesthesia Stop:  1329    Procedures:       REMOVAL, INTRAUTERINE DEVICE, HYSTEROSCOPY (Uterus)      DILATION AND CURETTAGE (N/A Uterus) Diagnosis:  (INTRAUTERINE CONTRACEPTIVE DEVICE THREADS LOST, SUBSEQUENT ENCOUNTER)    Surgeon:  Jamil Rick M.D. Responsible Provider:  Antwan Curtis M.D.    Anesthesia Type:  general ASA Status:  2          Final Anesthesia Type: general  Last vitals  BP   Blood Pressure: 138/90    Temp   36.9 °C (98.5 °F)    Pulse   Pulse: 75, Heart Rate (Monitored): 75   Resp   19    SpO2   94 %      Anesthesia Post Evaluation    Patient location during evaluation: PACU  Patient participation: complete - patient participated  Level of consciousness: awake and alert  Pain score: 1    Airway patency: patent  Anesthetic complications: no  Cardiovascular status: hemodynamically stable  Respiratory status: acceptable  Hydration status: euvolemic    PONV: none           Nurse Pain Score: 0 (NPRS)

## 2019-07-29 NOTE — ANESTHESIA TIME REPORT
Anesthesia Start and Stop Event Times     Date Time Event    7/29/2019 1227 Anesthesia Start     1329 Anesthesia Stop        Responsible Staff  07/29/19    Name Role Begin End    Antwan Curtis M.D. Anesth 1227 1329        Preop Diagnosis (Free Text):  Pre-op Diagnosis     INTRAUTERINE CONTRACEPTIVE DEVICE THREADS LOST, SUBSEQUENT ENCOUNTER        Preop Diagnosis (Codes):  Diagnosis Information     Diagnosis Code(s):         Post op Diagnosis  IUD strings lost      Premium Reason  Non-Premium    Comments:

## 2019-07-29 NOTE — DISCHARGE INSTRUCTIONS
ACTIVITY: Rest and take it easy for the first 24 hours.  A responsible adult is recommended to remain with you during that time.  It is normal to feel sleepy.  We encourage you to not do anything that requires balance, judgment or coordination.    MILD FLU-LIKE SYMPTOMS ARE NORMAL. YOU MAY EXPERIENCE GENERALIZED MUSCLE ACHES, THROAT IRRITATION, HEADACHE AND/OR SOME NAUSEA.    FOR 24 HOURS DO NOT:  Drive, operate machinery or run household appliances.  Drink beer or alcoholic beverages.   Make important decisions or sign legal documents.    SPECIAL INSTRUCTIONS:     Please return or call if:     Pain uncontrolled by pain medications   Fever greater than 100.4 degrees   Heavy vaginal bleeding   Or if you have any other questions or concerns     Please follow-up in clinic in 2 to 3 weeks    See handout for further instruction    DIET: To avoid nausea, slowly advance diet as tolerated, avoiding spicy or greasy foods for the first day.  Add more substantial food to your diet according to your physician's instructions.  Babies can be fed formula or breast milk as soon as they are hungry.  INCREASE FLUIDS AND FIBER TO AVOID CONSTIPATION.    SURGICAL DRESSING/BATHING: Do not submerge in water until cleared by provider.     FOLLOW-UP APPOINTMENT:  A follow-up appointment should be arranged with your doctor in 2-3 weeks; call to schedule.    You should CALL YOUR PHYSICIAN if you develop:  Fever greater than 101 degrees F.  Pain not relieved by medication, or persistent nausea or vomiting.  Excessive bleeding (blood soaking through dressing) or unexpected drainage from the wound.  Extreme redness or swelling around the incision site, drainage of pus or foul smelling drainage.  Inability to urinate or empty your bladder within 8 hours.  Problems with breathing or chest pain.    You should call 911 if you develop problems with breathing or chest pain.  If you are unable to contact your doctor or surgical center, you should go  to the nearest emergency room or urgent care center.  Physician's telephone #: Dr. Rick [phone]    If any questions arise, call your doctor.  If your doctor is not available, please feel free to call the Surgical Center at [phone].  The Center is open Monday through Friday from 7AM to 7PM.  You can also call the HEALTH HOTLINE open 24 hours/day, 7 days/week and speak to a nurse at [phone], or toll free at [phone].    A registered nurse may call you a few days after your surgery to see how you are doing after your procedure.    MEDICATIONS: Resume taking daily medication.  Take prescribed pain medication with food.  If no medication is prescribed, you may take non-aspirin pain medication if needed.  PAIN MEDICATION CAN BE VERY CONSTIPATING.  Take a stool softener or laxative such as senokot, pericolace, or milk of magnesia if needed.    Last pain medication given at 1:41 Oxycodone 10 mg. Ok to take Tylenol anytime once home, do not exceed 4g in 24 hrs. Also ok to alternate with Ibuprofen.     If your physician has prescribed pain medication that includes Acetaminophen (Tylenol), do not take additional Acetaminophen (Tylenol) while taking the prescribed medication.    Depression / Suicide Risk    As you are discharged from this [facility] facility, it is important to learn how to keep safe from harming yourself.    Recognize the warning signs:  · Abrupt changes in personality, positive or negative- including increase in energy   · Giving away possessions  · Change in eating patterns- significant weight changes-  positive or negative  · Change in sleeping patterns- unable to sleep or sleeping all the time   · Unwillingness or inability to communicate  · Depression  · Unusual sadness, discouragement and loneliness  · Talk of wanting to die  · Neglect of personal appearance   · Rebelliousness- reckless behavior  · Withdrawal from people/activities they love  · Confusion- inability to  concentrate     If you or a loved one observes any of these behaviors or has concerns about self-harm, here's what you can do:  · Talk about it- your feelings and reasons for harming yourself  · Remove any means that you might use to hurt yourself (examples: pills, rope, extension cords, firearm)  · Get professional help from the community (Mental Health, Substance Abuse, psychological counseling)  · Do not be alone:Call your Safe Contact- someone whom you trust who will be there for you.  · Call your local CRISIS HOTLINE 027-7773 or 582-494-6093  · Call your local Children's Mobile Crisis Response Team [place] (135) 294-6197 or www.[employer]  · Call the toll free National Suicide Prevention Hotlines   · National Suicide Prevention Lifeline 876-365-ZSAS (5496)  · National Hope Line Network 800-SUICIDE (171-2458)

## 2019-07-29 NOTE — ANESTHESIA PROCEDURE NOTES
Airway  Date/Time: 7/29/2019 12:37 PM  Performed by: STELLA RUIZ  Authorized by: STELLA RUIZ     Location:  OR  Urgency:  Elective  Indications for Airway Management:  Anesthesia  Spontaneous Ventilation: absent    Sedation Level:  Deep  Preoxygenated: Yes    Final Airway Type:  Supraglottic airway  Final Supraglottic Airway:  Standard LMA  SGA Size:  3  Number of Attempts at Approach:  1

## 2019-07-29 NOTE — OR SURGEON
Immediate Post OP Note    PreOp Diagnosis: Retained IUD.  Unsuccessful attempt of removal in the office    PostOp Diagnosis: Same    Procedure(s):  REMOVAL, INTRAUTERINE DEVICE, HYSTEROSCOPY - Wound Class: Clean Contaminated  DILATION AND CURETTAGE - Wound Class: Clean Contaminated    Surgeon(s):  Jamil Rick M.D.    Anesthesiologist/Type of Anesthesia:  Anesthesiologist: Antwan Curtis M.D./General    Surgical Staff:  Circulator: Andre Gonzalez R.N.; Michelle Reynoso R.N.  Scrub Person: Veronica Macdonald    Specimens removed if any:  * No specimens in log *    Estimated Blood Loss: 10 mL    Findings: Retained IUD within the uterine cavity, no evidence of embedment into myometrium.  Normal-appearing endometrial cavity following removal of IUD    Complications: None        7/29/2019 1:29 PM Jamil Rick M.D.

## 2019-07-29 NOTE — ANESTHESIA PREPROCEDURE EVALUATION
Relevant Problems   (+) Hypothyroidism       Physical Exam    Airway    Cardiovascular    Dental    Pulmonary    Abdominal   (+) obese     Neurological              Anesthesia Plan        Plan - general       Airway plan will be LMA        Induction: intravenous    Postoperative Plan: Postoperative administration of opioids is intended.    Pertinent diagnostic labs and testing reviewed    Informed Consent:    Anesthetic plan and risks discussed with patient.    Use of blood products discussed with: patient whom consented to blood products.

## 2019-07-29 NOTE — ANESTHESIA PREPROCEDURE EVALUATION
Relevant Problems   (+) Hypothyroidism       Physical Exam    Airway   Mallampati: II  TM distance: >3 FB  Neck ROM: full       Cardiovascular - normal exam  Rhythm: regular  Rate: normal  (-) murmur     Dental - normal exam         Pulmonary - normal exam  Breath sounds clear to auscultation     Abdominal   Abdomen: soft  Bowel sounds: normal   Neurological - normal exam                 Anesthesia Plan    ASA 2       Plan - general       Airway plan will be LMA        Induction: intravenous          Informed Consent:    Anesthetic plan and risks discussed with patient.

## 2019-07-29 NOTE — OP REPORT
DATE OF SERVICE: July 29, 2019     PREOPERATIVE DIAGNOSES:  1.  Retained IUD  2.  Failed attempted removal in office     POSTOPERATIVE DIAGNOSES:  1.  Same    PROCEDURE PERFORMED: Hysteroscopy with removal of retained IUD    SURGEON:  Jamil Rick MD     ASSISTANT: none     ANESTHESIA:  Spinal.     ANESTHESIOLOGIST: Antwan Curtis MD     SPECIMEN: IUD     ESTIMATED BLOOD LOSS: 10 mL     FINDINGS: Retained IUD visualized within the endometrial cavity.  Normal-appearing endometrial cavity following removal of IUD     COMPLICATIONS:  None.     PROCEDURE:  After appropriate consents were obtained, the patient was taken to the operating room where spinal  anesthesia was applied without complications.  The patient was then prepped and draped in the usual sterile manner.       Cervix was then visualized with the help of a speculum.  The anterior lip of the cervix was then grasped with a single-tooth tenaculum.  The cervix was then dilated until an operative hysteroscope was able to be introduced into the endometrial cavity.  The IUD was visualized within the cavity.  It did not appear to be embedded into the myometrium.  Using a hysteroscopic grasper, the IUD was then grasped and removed.    The endometrial cavity was again visualized, no evidence of any abnormality was noted.    Procedure was then terminated at this time     Sponge, instrument, and lap counts were correct x2.  Patient tolerated the procedure well and went to recovery room in stable condition.        ____________________________________  Jamil Rick MD

## 2019-07-29 NOTE — ANESTHESIA PREPROCEDURE EVALUATION
Relevant Problems   (+) Hypothyroidism       Physical Exam    Anesthesia Plan    ASA 2                       Informed Consent:

## 2019-07-29 NOTE — OR NURSING
1329 Pt to PACU from OR. Report from anesthesia and OR RN. LMA in place, maintaining sats on RA. Respirations even and unlabored. VSS.     1335 Pt waking at this time, dc'd LMA. Respirations even and unlabored.     1341 c/o cramping pain 4/10. Medicated with larger oral dose in anticipation of increase in pain and pt request. Medicated with IV pain medication per MAR. Heat pack applied for comfort.    1352 Medicated again for unchanged pain.    1359 Medicated again for 4/10 pain.    1407 Pain improving, still c/o 3/10 cramping pain, medicated per MAR. SO brought to bs.     1414 Pt states pain is tolerable after intervention. No longer needing IV medication, meets phase II criteria. Encouraged pt to void once before discharge, verbalizes understanding.     1437 Pt up to bathroom via gurney, steady gait, able to void w/o difficulty. Dressed with SO's assistance.     1450 Discharge orders received. Meets discharge criteria at this time. Tolerable pain. No nausea. Tolerating PO. On RA. All lines and monitors discontinued. Reviewed discharge paperwork with pt and SO. Discussed diet, activity, medications, follow up care and worsening symptoms. No questions at this time. Pt to be discharged to home via private vehicle. Offered hospital escort and wc, pt declined, ambulated out of department with RN, SO, and all belongings.

## 2019-08-19 NOTE — PROGRESS NOTES
GYN Visit:    CC: postop check    HPI: 24 y.o.yo  s/p hysteroscopic IUD removal on 19 with Dr. Rick for retained IUD.    Pt reports doing well.  Minimal pain.  Denies vaginal bleeding or discharge.  No problems with  nexplanon in place for desired contraception.    ROS:   Gen: denies fevers, general concerns  : denies vaginal bleeding, discharge    /76   Wt 111.6 kg (246 lb)   BMI 39.71 kg/m²   Gen: AAO, NAD  : deferred    A/P:24 y.o.yo  s/p hysteroscopic IUD removal on 19 with Dr. Rick  - no signs of postop complications  - discussed irregular bleeding can occur w/ nexplanon    F/u: prn or annually    Natividad Bonds MD  [facility], Women's Health

## 2019-08-20 ENCOUNTER — GYNECOLOGY VISIT (OUTPATIENT)
Dept: OBGYN | Facility: CLINIC | Age: 24
End: 2019-08-20
Payer: COMMERCIAL

## 2019-08-20 VITALS — WEIGHT: 246 LBS | BODY MASS INDEX: 39.71 KG/M2 | SYSTOLIC BLOOD PRESSURE: 120 MMHG | DIASTOLIC BLOOD PRESSURE: 76 MMHG

## 2019-08-20 DIAGNOSIS — Z09 POSTOP CHECK: ICD-10-CM

## 2019-08-20 PROCEDURE — 99212 OFFICE O/P EST SF 10 MIN: CPT | Performed by: OBSTETRICS & GYNECOLOGY

## 2019-08-20 NOTE — NON-PROVIDER
Pt here for s/p hysteroscopy IUD removal  Pt states no complaints  Good#[phone]  Pharmacy verified

## 2019-11-12 ENCOUNTER — TELEPHONE (OUTPATIENT)
Dept: OBGYN | Facility: CLINIC | Age: 24
End: 2019-11-12

## 2019-11-12 NOTE — TELEPHONE ENCOUNTER
Pt called states she is been trying to set up an appt to lexii seen. C/o having VB for a month.    Scheduled on 11/22/19 @ 1130, also would like to be placed on waiting list.      Agreed to plan.

## 2019-11-13 ENCOUNTER — OFFICE VISIT (OUTPATIENT)
Dept: URGENT CARE | Facility: CLINIC | Age: 24
End: 2019-11-13
Payer: COMMERCIAL

## 2019-11-13 VITALS
SYSTOLIC BLOOD PRESSURE: 118 MMHG | HEART RATE: 101 BPM | HEIGHT: 67 IN | DIASTOLIC BLOOD PRESSURE: 92 MMHG | OXYGEN SATURATION: 96 % | BODY MASS INDEX: 37.51 KG/M2 | WEIGHT: 239 LBS | RESPIRATION RATE: 16 BRPM | TEMPERATURE: 97.7 F

## 2019-11-13 DIAGNOSIS — R09.81 NASAL CONGESTION WITH RHINORRHEA: ICD-10-CM

## 2019-11-13 DIAGNOSIS — J34.89 NASAL CONGESTION WITH RHINORRHEA: ICD-10-CM

## 2019-11-13 DIAGNOSIS — R05.9 COUGH: ICD-10-CM

## 2019-11-13 DIAGNOSIS — R09.82 PND (POST-NASAL DRIP): ICD-10-CM

## 2019-11-13 DIAGNOSIS — J06.9 URI WITH COUGH AND CONGESTION: ICD-10-CM

## 2019-11-13 DIAGNOSIS — R06.2 WHEEZE: ICD-10-CM

## 2019-11-13 PROCEDURE — 99204 OFFICE O/P NEW MOD 45 MIN: CPT | Performed by: NURSE PRACTITIONER

## 2019-11-13 RX ORDER — FLUTICASONE PROPIONATE 50 MCG
2 SPRAY, SUSPENSION (ML) NASAL DAILY
Qty: 1 BOTTLE | Refills: 0 | Status: SHIPPED | OUTPATIENT
Start: 2019-11-13 | End: 2019-11-26

## 2019-11-13 RX ORDER — BUSPIRONE HYDROCHLORIDE 10 MG/1
10 TABLET ORAL 3 TIMES DAILY
Refills: 1 | COMMUNITY
Start: 2019-10-28 | End: 2020-12-03

## 2019-11-13 RX ORDER — AZITHROMYCIN 250 MG/1
TABLET, FILM COATED ORAL
Qty: 6 TAB | Refills: 0 | Status: SHIPPED | OUTPATIENT
Start: 2019-11-13 | End: 2019-11-26

## 2019-11-13 RX ORDER — ALBUTEROL SULFATE 90 UG/1
2 AEROSOL, METERED RESPIRATORY (INHALATION) EVERY 6 HOURS PRN
Qty: 8.5 G | Refills: 0 | Status: SHIPPED | OUTPATIENT
Start: 2019-11-13 | End: 2019-11-26

## 2019-11-13 RX ORDER — ALBUTEROL SULFATE 90 UG/1
2 AEROSOL, METERED RESPIRATORY (INHALATION) EVERY 6 HOURS PRN
COMMUNITY
End: 2019-12-08

## 2019-11-13 ASSESSMENT — ENCOUNTER SYMPTOMS
SENSORY CHANGE: 0
DIARRHEA: 0
COUGH: 1
DIZZINESS: 0
CONSTIPATION: 0
SHORTNESS OF BREATH: 1
TINGLING: 0
MYALGIAS: 0
HEADACHES: 0
SPUTUM PRODUCTION: 0
ABDOMINAL PAIN: 0
PALPITATIONS: 0
WEAKNESS: 0
VOMITING: 0
SORE THROAT: 1
CHILLS: 0
BACK PAIN: 0
FEVER: 0
ORTHOPNEA: 0
SINUS PAIN: 0
WHEEZING: 1
NAUSEA: 0

## 2019-11-14 NOTE — PROGRESS NOTES
Subjective:      Vanessa Sanchez is a 24 y.o. female who presents with Cough (RT ear pain, head ache, sinus, chest congestion x1 month got better then sx returned.  )            HPI  Cough: dry, nasal congestion, PND, sore throat, chest congestion, intermittent wheeze. Will get wheeze with cough/illness or exercise induced. Dayquil/Nyquil. Able to sleep at night.     PMH:  has a past medical history of Anxiety, Asthma, Asthma, Attention deficit hyperactivity disorder (ADHD) (6/27/2019), Borderline personality disorder (HCC) (2014), Depression, Hypothyroidism (6/27/2019), Sleep apnea, Sleep apnea, and Snoring. She also has no past medical history of Addisons disease ([facility]), Adrenal disorder ([facility]), Allergy, Anemia, Arrhythmia, Arthritis, Blood transfusion without reported diagnosis, Cancer ([facility]), Cataract, CHF (congestive heart failure) ([facility]), Clotting disorder ([facility]), COPD (chronic obstructive pulmonary disease) ([facility]), Cushings syndrome ([facility]), Diabetes ([facility]), Diabetic neuropathy ([facility]), GERD (gastroesophageal reflux disease), Glaucoma, Goiter, Head ache, Heart attack ([facility]), Heart murmur, HIV (human immunodeficiency virus infection) ([facility]), Hyperlipidemia, Hypertension, IBD (inflammatory bowel disease), Kidney disease, Meningitis, Migraine, Muscle disorder, Osteoporosis, Parathyroid disorder ([facility]), Pituitary disease ([facility]), Pulmonary emphysema ([facility]), Seizure ([facility]), Sickle cell disease ([facility]), Stroke ([facility]), Substance abuse ([facility]), or Tuberculosis.  MEDS:   Current Outpatient Medications:   •  busPIRone (BUSPAR) 10 MG Tab tablet, TAKE 1 TABLET BY MOUTH TWICE A DAY, Disp: , Rfl: 1  •  albuterol 108 (90 Base) MCG/ACT Aero Soln inhalation aerosol, Inhale 2 Puffs by mouth every 6 hours as needed for Shortness of Breath., Disp: , Rfl:   •  fluticasone (FLONASE) 50 MCG/ACT nasal spray, Spray 2 Sprays in nose every day., Disp: 1 Bottle, Rfl: 0  •  azithromycin (ZITHROMAX) 250 MG Tab, Take 2 tabs by mouth on day 1, then take 1 tab  on days 2-5, Disp: 6 Tab, Rfl: 0  •  albuterol 108 (90 Base) MCG/ACT Aero Soln inhalation aerosol, Inhale 2 Puffs by mouth every 6 hours as needed for Shortness of Breath., Disp: 8.5 g, Rfl: 0  •  venlafaxine (EFFEXOR-XR) 150 MG extended-release capsule, Take 1 Cap by mouth every day., Disp: 30 Cap, Rfl: 3  •  methylphenidate (RITALIN) 5 MG Tab, Take 10 mg by mouth 2 times a day., Disp: , Rfl:   •  betamethasone dipropionate (DIPROLENE) 0.05 % Cream, Apply 1 Application to affected area(s) 2 times a day. (Patient not taking: Reported on 7/23/2019), Disp: 1 Tube, Rfl: 0  •  ALPRAZolam (XANAX) 0.5 MG Tab, Take 0.5 mg by mouth at bedtime as needed for Sleep., Disp: , Rfl:   ALLERGIES:   Allergies   Allergen Reactions   • Seasonal      SURGHX:   Past Surgical History:   Procedure Laterality Date   • PB REMOVE INTRAUTERINE DEVICE  7/29/2019    Procedure: REMOVAL, INTRAUTERINE DEVICE, HYSTEROSCOPY;  Surgeon: Jamil Rick M.D.;  Location: SURGERY SAME DAY [facility] ORS;  Service: Obstetrics   • DILATION AND CURETTAGE N/A 7/29/2019    Procedure: DILATION AND CURETTAGE;  Surgeon: Jamil Rick M.D.;  Location: SURGERY SAME DAY [facility] ORS;  Service: Obstetrics   • TONSILLECTOMY AND ADENOIDECTOMY  2013   • OTHER      Turbinate reduction     SOCHX:  reports that she has never smoked. She has never used smokeless tobacco. She reports current alcohol use. She reports current drug use. Drugs: Marijuana and Inhaled.  FH: Family history was reviewed, no pertinent findings to report    Review of Systems   Constitutional: Positive for malaise/fatigue. Negative for chills and fever.   HENT: Positive for congestion, ear pain and sore throat. Negative for sinus pain.    Respiratory: Positive for cough, shortness of breath and wheezing. Negative for sputum production.    Cardiovascular: Negative for chest pain, palpitations and orthopnea.   Gastrointestinal: Negative for abdominal pain, constipation, diarrhea, nausea and  "vomiting.   Musculoskeletal: Negative for back pain and myalgias.   Skin: Negative for itching and rash.   Neurological: Negative for dizziness, tingling, sensory change, weakness and headaches.   Endo/Heme/Allergies: Negative for environmental allergies.   All other systems reviewed and are negative.         Objective:     /92   Pulse (!) 101   Temp 36.5 °C (97.7 °F)   Resp 16   Ht 1.689 m (5' 6.5\")   Wt 108.4 kg (239 lb)   SpO2 96%   BMI 38.00 kg/m²      Physical Exam  Vitals signs reviewed.   Constitutional:       General: She is awake. She is not in acute distress.     Appearance: Normal appearance. She is well-developed. She is not ill-appearing, toxic-appearing or diaphoretic.   HENT:      Head: Normocephalic.      Right Ear: Tympanic membrane, ear canal and external ear normal.      Left Ear: Ear canal and external ear normal. A middle ear effusion is present.      Ears:      Comments: Ears: Brook pinnae. Right external auditory canal with occlusion with impacted cerumen. Procedure: Pt then prepped and lavaged by MA.TM pearly gray with normal light reflex bilaterally.         Nose: Mucosal edema and rhinorrhea present.      Mouth/Throat:      Pharynx: Posterior oropharyngeal erythema present.   Eyes:      General:         Right eye: No discharge.         Left eye: No discharge.      Conjunctiva/sclera: Conjunctivae normal.      Pupils: Pupils are equal, round, and reactive to light.   Neck:      Musculoskeletal: Normal range of motion.   Cardiovascular:      Rate and Rhythm: Normal rate and regular rhythm.   Pulmonary:      Effort: Pulmonary effort is normal. No accessory muscle usage or respiratory distress.      Breath sounds: Normal breath sounds.   Abdominal:      General: Bowel sounds are normal. There is no distension.      Palpations: Abdomen is soft.      Tenderness: There is no tenderness. There is no guarding or rebound.   Musculoskeletal: Normal range of motion.   Lymphadenopathy:      " Cervical: No cervical adenopathy.   Skin:     General: Skin is warm and dry.   Neurological:      Mental Status: She is alert and oriented to person, place, and time.   Psychiatric:         Behavior: Behavior is cooperative.                 Assessment/Plan:       1. Cough    2. Nasal congestion with rhinorrhea    - fluticasone (FLONASE) 50 MCG/ACT nasal spray; Spray 2 Sprays in nose every day.  Dispense: 1 Bottle; Refill: 0    3. PND (post-nasal drip)    - fluticasone (FLONASE) 50 MCG/ACT nasal spray; Spray 2 Sprays in nose every day.  Dispense: 1 Bottle; Refill: 0    4. URI with cough and congestion    - azithromycin (ZITHROMAX) 250 MG Tab; Take 2 tabs by mouth on day 1, then take 1 tab on days 2-5  Dispense: 6 Tab; Refill: 0    5. Wheeze    - albuterol 108 (90 Base) MCG/ACT Aero Soln inhalation aerosol; Inhale 2 Puffs by mouth every 6 hours as needed for Shortness of Breath.  Dispense: 8.5 g; Refill: 0    Increase water intake  May use Ibuprofen/Tylenol prn for fever or body aches  Get rest  May use daily longer acting antihistamine prn  May use saline nasal spray prn to flush any nasal congestion   Use inhaler prn for SOB with cough  May use OTC cough suppressant medications like Robitussin/Delsym prn  Monitor for fevers, productive cough, SOB, CP, chest tightness- need re-evaluation

## 2019-11-22 ENCOUNTER — GYNECOLOGY VISIT (OUTPATIENT)
Dept: OBGYN | Facility: CLINIC | Age: 24
End: 2019-11-22
Payer: COMMERCIAL

## 2019-11-22 VITALS — DIASTOLIC BLOOD PRESSURE: 68 MMHG | WEIGHT: 239 LBS | SYSTOLIC BLOOD PRESSURE: 108 MMHG | BODY MASS INDEX: 38 KG/M2

## 2019-11-22 DIAGNOSIS — Z97.5 BREAKTHROUGH BLEEDING ON NEXPLANON: ICD-10-CM

## 2019-11-22 DIAGNOSIS — N92.1 BREAKTHROUGH BLEEDING ON NEXPLANON: ICD-10-CM

## 2019-11-22 PROCEDURE — 99213 OFFICE O/P EST LOW 20 MIN: CPT | Performed by: OBSTETRICS & GYNECOLOGY

## 2019-11-22 RX ORDER — ESTRADIOL 1 MG/1
0.5 TABLET ORAL DAILY
Qty: 10 TAB | Refills: 1 | Status: SHIPPED | OUTPATIENT
Start: 2019-11-22 | End: 2019-12-08

## 2019-11-22 NOTE — NON-PROVIDER
Pt here to discuss vaginal bleeding.    Pt states she is experiencing vaginal bleeding since October. Denies any pain.   Pt had Nexplanon inserted 6/2019  Good# [phone]  LMP: 10/12/19

## 2019-11-25 ENCOUNTER — OFFICE VISIT (OUTPATIENT)
Dept: URGENT CARE | Facility: CLINIC | Age: 24
End: 2019-11-25
Payer: COMMERCIAL

## 2019-11-25 VITALS
TEMPERATURE: 98.1 F | HEIGHT: 66 IN | HEART RATE: 74 BPM | OXYGEN SATURATION: 94 % | SYSTOLIC BLOOD PRESSURE: 124 MMHG | BODY MASS INDEX: 38.57 KG/M2 | WEIGHT: 240 LBS | RESPIRATION RATE: 12 BRPM | DIASTOLIC BLOOD PRESSURE: 82 MMHG

## 2019-11-25 DIAGNOSIS — S91.215A LACERATION OF LESSER TOE OF LEFT FOOT WITHOUT FOREIGN BODY WITH DAMAGE TO NAIL, INITIAL ENCOUNTER: ICD-10-CM

## 2019-11-25 PROCEDURE — 99213 OFFICE O/P EST LOW 20 MIN: CPT | Performed by: NURSE PRACTITIONER

## 2019-11-25 ASSESSMENT — ENCOUNTER SYMPTOMS
MUSCULOSKELETAL NEGATIVE: 1
FOCAL WEAKNESS: 0
CONSTITUTIONAL NEGATIVE: 1
RESPIRATORY NEGATIVE: 1
NEUROLOGICAL NEGATIVE: 1
TINGLING: 0
SENSORY CHANGE: 0

## 2019-11-25 NOTE — PROGRESS NOTES
Subjective:     Vanessa Sanchez is a 24 y.o. female who presents for Toe Injury (dropped a knife on LT 4th diget toe x yesturday around 1 am )       Toe Injury   This is a new problem. The problem has been gradually worsening.     Patient reports that about 11 hours ago she accidentally dropped a serrated clean kitchen knife on the top of her left foot.  Has a laceration at the top of her left fourth toe.  Reports having had bleeding which is now coming under control.  Denies weakness, loss of range of motion, or sensory changes.    Tdap received 2017 per chart review.    PMH:  has a past medical history of Anxiety, Asthma, Asthma, Attention deficit hyperactivity disorder (ADHD) (6/27/2019), Borderline personality disorder (HCC) (2014), Depression, Hypothyroidism (6/27/2019), Sleep apnea, Sleep apnea, and Snoring. She also has no past medical history of Addisons disease ([facility]), Adrenal disorder ([facility]), Allergy, Anemia, Arrhythmia, Arthritis, Blood transfusion without reported diagnosis, Cancer ([facility]), Cataract, CHF (congestive heart failure) ([facility]), Clotting disorder ([facility]), COPD (chronic obstructive pulmonary disease) ([facility]), Cushings syndrome ([facility]), Diabetes ([facility]), Diabetic neuropathy ([facility]), GERD (gastroesophageal reflux disease), Glaucoma, Goiter, Head ache, Heart attack ([facility]), Heart murmur, HIV (human immunodeficiency virus infection) ([facility]), Hyperlipidemia, Hypertension, IBD (inflammatory bowel disease), Kidney disease, Meningitis, Migraine, Muscle disorder, Osteoporosis, Parathyroid disorder ([facility]), Pituitary disease ([facility]), Pulmonary emphysema ([facility]), Seizure ([facility]), Sickle cell disease ([facility]), Stroke ([facility]), Substance abuse ([facility]), or Tuberculosis.    MEDS:   Current Outpatient Medications:   •  estradiol (ESTRACE) 1 MG Tab, Take 0.5 Tabs by mouth every day., Disp: 10 Tab, Rfl: 1  •  busPIRone (BUSPAR) 10 MG Tab tablet, TAKE 1 TABLET BY MOUTH TWICE A DAY, Disp: , Rfl: 1  •  venlafaxine (EFFEXOR-XR) 150 MG  extended-release capsule, Take 1 Cap by mouth every day., Disp: 30 Cap, Rfl: 3  •  albuterol 108 (90 Base) MCG/ACT Aero Soln inhalation aerosol, Inhale 2 Puffs by mouth every 6 hours as needed for Shortness of Breath., Disp: , Rfl:   •  fluticasone (FLONASE) 50 MCG/ACT nasal spray, Spray 2 Sprays in nose every day. (Patient not taking: Reported on 11/25/2019), Disp: 1 Bottle, Rfl: 0  •  azithromycin (ZITHROMAX) 250 MG Tab, Take 2 tabs by mouth on day 1, then take 1 tab on days 2-5 (Patient not taking: Reported on 11/25/2019), Disp: 6 Tab, Rfl: 0  •  albuterol 108 (90 Base) MCG/ACT Aero Soln inhalation aerosol, Inhale 2 Puffs by mouth every 6 hours as needed for Shortness of Breath. (Patient not taking: Reported on 11/25/2019), Disp: 8.5 g, Rfl: 0  •  methylphenidate (RITALIN) 5 MG Tab, Take 10 mg by mouth 2 times a day., Disp: , Rfl:   •  betamethasone dipropionate (DIPROLENE) 0.05 % Cream, Apply 1 Application to affected area(s) 2 times a day. (Patient not taking: Reported on 7/23/2019), Disp: 1 Tube, Rfl: 0  •  ALPRAZolam (XANAX) 0.5 MG Tab, Take 0.5 mg by mouth at bedtime as needed for Sleep., Disp: , Rfl:     ALLERGIES:   Allergies   Allergen Reactions   • Seasonal      SURGHX:   Past Surgical History:   Procedure Laterality Date   • PB REMOVE INTRAUTERINE DEVICE  7/29/2019    Procedure: REMOVAL, INTRAUTERINE DEVICE, HYSTEROSCOPY;  Surgeon: Jamil Rick M.D.;  Location: SURGERY SAME DAY [facility];  Service: Obstetrics   • DILATION AND CURETTAGE N/A 7/29/2019    Procedure: DILATION AND CURETTAGE;  Surgeon: Jamil Rick M.D.;  Location: SURGERY SAME DAY [facility];  Service: Obstetrics   • TONSILLECTOMY AND ADENOIDECTOMY  2013   • OTHER      Turbinate reduction     SOCHX:  reports that she has been smoking. She has been smoking about 0.00 packs per day. She has never used smokeless tobacco. She reports current alcohol use. She reports current drug use. Drugs: Marijuana and Inhaled.     FH:  "Reviewed with patient, not pertinent to this visit.    Review of Systems   Constitutional: Negative.    Respiratory: Negative.    Musculoskeletal: Negative.    Skin:        Left fourth toe laceration   Neurological: Negative.  Negative for tingling, sensory change and focal weakness.   All other systems reviewed and are negative.    Objective:     /82   Pulse 74   Temp 36.7 °C (98.1 °F)   Resp 12   Ht 1.676 m (5' 6\")   Wt 108.9 kg (240 lb)   SpO2 94%   BMI 38.74 kg/m²     Physical Exam  Vitals signs reviewed.   Constitutional:       General: She is not in acute distress.     Appearance: She is well-developed. She is not toxic-appearing or diaphoretic.   HENT:      Head: Normocephalic.      Right Ear: External ear normal.      Left Ear: External ear normal.      Nose: Nose normal.   Eyes:      Conjunctiva/sclera: Conjunctivae normal.   Neck:      Musculoskeletal: Normal range of motion.   Cardiovascular:      Rate and Rhythm: Normal rate.      Pulses: Normal pulses.   Pulmonary:      Effort: Pulmonary effort is normal. No respiratory distress.   Musculoskeletal: Normal range of motion.         General: No deformity.      Left foot: Normal range of motion and normal capillary refill. Swelling and laceration (Dorsal L 4th toe involving lateral nail plate and bed, approx 0.5 cm in length, bleeding controlled, underlying soft tissue swelling, NVI distally) present.   Skin:     General: Skin is warm and dry.      Capillary Refill: Capillary refill takes less than 2 seconds.      Coloration: Skin is not pale.   Neurological:      Mental Status: She is alert and oriented to person, place, and time.      Sensory: No sensory deficit.      Coordination: Coordination normal.   Psychiatric:         Behavior: Behavior normal. Behavior is cooperative.          Assessment/Plan:     1. Laceration of lesser toe of left foot without foreign body with damage to nail, initial encounter    Laceration involving nail left " fourth toe.  Collaborated with other urgent care provider.  There is substantial underlying soft tissue swelling preventing approximation of wound edges.  Discussed with patient that closure with sutures cannot be accomplished at this time.  Bleeding is controlled.  Reinforced laceration with 3 Steri-Strips.  Advised on wound care including keeping laceration clean, dry, and protected.  Recommend follow-up for wound check in 2 days.    Discussed close monitoring and supportive measures including increasing fluids and rest as well as OTC symptom management including acetaminophen and/or ibuprofen PRN pain.    Work note provided.    Vital signs stable, afebrile, asymptomatic, no acute distress.    Warning signs reviewed. Strict return/ER precautions advised.    Patient advised to: Return for 1) Symptoms don't improve or worsen, or go to ER, 2) Follow up with primary care in 7-10 days.    Differential diagnosis, natural history, supportive care, and indications for immediate follow-up discussed. All questions answered. Patient agrees with the plan of care.

## 2019-11-25 NOTE — LETTER
November 25, 2019         Patient: Vanessa Sanchez   YOB: 1995   Date of Visit: 11/25/2019           To Whom it May Concern:    Vanessa Sanchez was seen in my clinic on 11/25/2019. The patient may return to work 11/28/2019 or sooner if the patient is feeling better.     If you have any questions or concerns, please don't hesitate to call.        Sincerely,           TIFF Obrien.  Electronically Signed

## 2019-11-26 ENCOUNTER — HOSPITAL ENCOUNTER (EMERGENCY)
Facility: MEDICAL CENTER | Age: 24
End: 2019-11-27
Attending: EMERGENCY MEDICINE
Payer: COMMERCIAL

## 2019-11-26 ENCOUNTER — OFFICE VISIT (OUTPATIENT)
Dept: URGENT CARE | Facility: CLINIC | Age: 24
End: 2019-11-26
Payer: COMMERCIAL

## 2019-11-26 VITALS
BODY MASS INDEX: 38.58 KG/M2 | OXYGEN SATURATION: 94 % | DIASTOLIC BLOOD PRESSURE: 74 MMHG | WEIGHT: 239 LBS | HEART RATE: 123 BPM | SYSTOLIC BLOOD PRESSURE: 126 MMHG | RESPIRATION RATE: 18 BRPM | TEMPERATURE: 100.1 F

## 2019-11-26 DIAGNOSIS — J10.1 INFLUENZA B: ICD-10-CM

## 2019-11-26 DIAGNOSIS — E86.0 DEHYDRATION: ICD-10-CM

## 2019-11-26 DIAGNOSIS — R05.3 PERSISTENT COUGH: ICD-10-CM

## 2019-11-26 DIAGNOSIS — R68.89 FEVER AND OTHER PHYSIOLOGIC DISTURBANCES OF TEMPERATURE REGULATION: ICD-10-CM

## 2019-11-26 DIAGNOSIS — R52 GENERALIZED BODY ACHES: ICD-10-CM

## 2019-11-26 DIAGNOSIS — R11.2 NAUSEA AND VOMITING, INTRACTABILITY OF VOMITING NOT SPECIFIED, UNSPECIFIED VOMITING TYPE: ICD-10-CM

## 2019-11-26 DIAGNOSIS — R11.2 NON-INTRACTABLE VOMITING WITH NAUSEA, UNSPECIFIED VOMITING TYPE: ICD-10-CM

## 2019-11-26 DIAGNOSIS — R68.89 FLU-LIKE SYMPTOMS: ICD-10-CM

## 2019-11-26 LAB
APPEARANCE UR: CLEAR
BILIRUB UR STRIP-MCNC: NORMAL MG/DL
COLOR UR AUTO: NORMAL
GLUCOSE UR STRIP.AUTO-MCNC: NORMAL MG/DL
INT CON NEG: NEGATIVE
INT CON POS: POSITIVE
KETONES UR STRIP.AUTO-MCNC: 160 MG/DL
LEUKOCYTE ESTERASE UR QL STRIP.AUTO: NORMAL
NITRITE UR QL STRIP.AUTO: NORMAL
PH UR STRIP.AUTO: 5.5 [PH] (ref 5–8)
POC URINE PREGNANCY TEST: NORMAL
PROT UR QL STRIP: 30 MG/DL
RBC UR QL AUTO: NORMAL
SP GR UR STRIP.AUTO: 1.03
UROBILINOGEN UR STRIP-MCNC: 0.2 MG/DL

## 2019-11-26 PROCEDURE — 99214 OFFICE O/P EST MOD 30 MIN: CPT | Mod: 25 | Performed by: FAMILY MEDICINE

## 2019-11-26 PROCEDURE — 700105 HCHG RX REV CODE 258: Performed by: EMERGENCY MEDICINE

## 2019-11-26 PROCEDURE — 99284 EMERGENCY DEPT VISIT MOD MDM: CPT

## 2019-11-26 PROCEDURE — 96375 TX/PRO/DX INJ NEW DRUG ADDON: CPT

## 2019-11-26 PROCEDURE — 96374 THER/PROPH/DIAG INJ IV PUSH: CPT

## 2019-11-26 PROCEDURE — 81002 URINALYSIS NONAUTO W/O SCOPE: CPT | Performed by: FAMILY MEDICINE

## 2019-11-26 PROCEDURE — 81025 URINE PREGNANCY TEST: CPT | Performed by: FAMILY MEDICINE

## 2019-11-26 PROCEDURE — 700102 HCHG RX REV CODE 250 W/ 637 OVERRIDE(OP): Performed by: EMERGENCY MEDICINE

## 2019-11-26 PROCEDURE — A9270 NON-COVERED ITEM OR SERVICE: HCPCS | Performed by: EMERGENCY MEDICINE

## 2019-11-26 RX ORDER — OSELTAMIVIR PHOSPHATE 75 MG/1
75 CAPSULE ORAL 2 TIMES DAILY
COMMUNITY
End: 2019-12-08

## 2019-11-26 RX ORDER — KETOROLAC TROMETHAMINE 30 MG/ML
30 INJECTION, SOLUTION INTRAMUSCULAR; INTRAVENOUS ONCE
Status: COMPLETED | OUTPATIENT
Start: 2019-11-27 | End: 2019-11-27

## 2019-11-26 RX ORDER — SODIUM CHLORIDE 9 MG/ML
2000 INJECTION, SOLUTION INTRAVENOUS ONCE
Status: COMPLETED | OUTPATIENT
Start: 2019-11-26 | End: 2019-11-27

## 2019-11-26 RX ORDER — BENZONATATE 100 MG/1
200 CAPSULE ORAL ONCE
Status: COMPLETED | OUTPATIENT
Start: 2019-11-26 | End: 2019-11-26

## 2019-11-26 RX ORDER — ONDANSETRON 4 MG/1
4 TABLET, ORALLY DISINTEGRATING ORAL EVERY 6 HOURS PRN
Qty: 10 TAB | Refills: 0 | Status: SHIPPED | OUTPATIENT
Start: 2019-11-26 | End: 2019-12-08

## 2019-11-26 RX ORDER — ONDANSETRON 2 MG/ML
4 INJECTION INTRAMUSCULAR; INTRAVENOUS ONCE
Status: COMPLETED | OUTPATIENT
Start: 2019-11-27 | End: 2019-11-27

## 2019-11-26 RX ORDER — KETOROLAC TROMETHAMINE 30 MG/ML
30 INJECTION, SOLUTION INTRAMUSCULAR; INTRAVENOUS ONCE
Status: COMPLETED | OUTPATIENT
Start: 2019-11-26 | End: 2019-11-26

## 2019-11-26 RX ORDER — ONDANSETRON 2 MG/ML
4 INJECTION INTRAMUSCULAR; INTRAVENOUS ONCE
Status: COMPLETED | OUTPATIENT
Start: 2019-11-26 | End: 2019-11-26

## 2019-11-26 RX ADMIN — KETOROLAC TROMETHAMINE 30 MG: 30 INJECTION, SOLUTION INTRAMUSCULAR; INTRAVENOUS at 19:46

## 2019-11-26 RX ADMIN — ONDANSETRON 4 MG: 2 INJECTION INTRAMUSCULAR; INTRAVENOUS at 19:47

## 2019-11-26 RX ADMIN — SODIUM CHLORIDE 2000 ML: 9 INJECTION, SOLUTION INTRAVENOUS at 22:51

## 2019-11-26 RX ADMIN — BENZONATATE 200 MG: 100 CAPSULE ORAL at 23:29

## 2019-11-26 ASSESSMENT — ENCOUNTER SYMPTOMS
VOMITING: 1
NAUSEA: 1

## 2019-11-26 NOTE — LETTER
November 26, 2019         Patient: Vanessa Sanchez   YOB: 1995   Date of Visit: 11/26/2019           To Whom it May Concern:    Vanessa Sanchez was seen in my clinic on 11/26/2019. She may return to school/work in 3-5 days.    If you have any questions or concerns, please don't hesitate to call.        Sincerely,           Brad Starr M.D.  Electronically Signed

## 2019-11-27 ENCOUNTER — OFFICE VISIT (OUTPATIENT)
Dept: URGENT CARE | Facility: CLINIC | Age: 24
End: 2019-11-27
Payer: COMMERCIAL

## 2019-11-27 VITALS
RESPIRATION RATE: 16 BRPM | BODY MASS INDEX: 37.54 KG/M2 | HEART RATE: 85 BPM | WEIGHT: 239.2 LBS | TEMPERATURE: 97.7 F | DIASTOLIC BLOOD PRESSURE: 76 MMHG | OXYGEN SATURATION: 95 % | HEIGHT: 67 IN | SYSTOLIC BLOOD PRESSURE: 110 MMHG

## 2019-11-27 VITALS
RESPIRATION RATE: 18 BRPM | BODY MASS INDEX: 37.43 KG/M2 | DIASTOLIC BLOOD PRESSURE: 86 MMHG | TEMPERATURE: 98.4 F | SYSTOLIC BLOOD PRESSURE: 110 MMHG | WEIGHT: 239 LBS | OXYGEN SATURATION: 96 % | HEART RATE: 70 BPM

## 2019-11-27 DIAGNOSIS — S91.215D: ICD-10-CM

## 2019-11-27 PROCEDURE — 700111 HCHG RX REV CODE 636 W/ 250 OVERRIDE (IP): Performed by: EMERGENCY MEDICINE

## 2019-11-27 RX ADMIN — KETOROLAC TROMETHAMINE 30 MG: 30 INJECTION, SOLUTION INTRAMUSCULAR at 00:06

## 2019-11-27 RX ADMIN — ONDANSETRON 4 MG: 2 INJECTION INTRAMUSCULAR; INTRAVENOUS at 00:06

## 2019-11-27 ASSESSMENT — ENCOUNTER SYMPTOMS
EYE DISCHARGE: 0
FEVER: 1
HEADACHES: 0
VOMITING: 1
EYE REDNESS: 0
MYALGIAS: 0
COUGH: 1

## 2019-11-27 NOTE — PROGRESS NOTES
Subjective:      Vanessa Sanchez is a 24 y.o. female who presents with Influenza (pt states she was diagnosed with influenza B today and states that when she got home she started throwing up and feeling really sick and was told to come here)      - This is a pleasant and non toxic appearing 24 y.o. female with c/o being in usual state of health until last night when she developed sudden onset cough/stuffy nose, body aches/malaise, subjective fever. Low appetite today and not able to eat/drink much. Vomited about 6 times, no diarrhea. Went to PCP at [facility] today and says they tested her for flu and was positive for flu B.             ALLERGIES:  Seasonal     PMH:  Past Medical History:   Diagnosis Date   • Anxiety    • Asthma    • Asthma    • Attention deficit hyperactivity disorder (ADHD) 6/27/2019   • Borderline personality disorder (HCC) 2014   • Depression    • Hypothyroidism 6/27/2019   • Sleep apnea    • Sleep apnea     uses c pap   • Snoring         PSH:  Past Surgical History:   Procedure Laterality Date   • PB REMOVE INTRAUTERINE DEVICE  7/29/2019    Procedure: REMOVAL, INTRAUTERINE DEVICE, HYSTEROSCOPY;  Surgeon: Jamil Rick M.D.;  Location: SURGERY SAME DAY [facility];  Service: Obstetrics   • DILATION AND CURETTAGE N/A 7/29/2019    Procedure: DILATION AND CURETTAGE;  Surgeon: Jamil Rick M.D.;  Location: SURGERY SAME DAY [facility];  Service: Obstetrics   • TONSILLECTOMY AND ADENOIDECTOMY  2013   • OTHER      Turbinate reduction       MEDS:    Current Outpatient Medications:   •  Oseltamivir Phosphate (TAMIFLU PO), Take  by mouth., Disp: , Rfl:   •  oseltamivir (TAMIFLU) 75 MG Cap, Take 75 mg by mouth 2 times a day., Disp: , Rfl:   •  estradiol (ESTRACE) 1 MG Tab, Take 0.5 Tabs by mouth every day., Disp: 10 Tab, Rfl: 1  •  busPIRone (BUSPAR) 10 MG Tab tablet, TAKE 1 TABLET BY MOUTH TWICE A DAY, Disp: , Rfl: 1  •  albuterol 108 (90 Base) MCG/ACT Aero Soln inhalation  aerosol, Inhale 2 Puffs by mouth every 6 hours as needed for Shortness of Breath., Disp: , Rfl:   •  methylphenidate (RITALIN) 5 MG Tab, Take 10 mg by mouth 2 times a day., Disp: , Rfl:   •  venlafaxine (EFFEXOR-XR) 150 MG extended-release capsule, Take 1 Cap by mouth every day., Disp: 30 Cap, Rfl: 3  •  ALPRAZolam (XANAX) 0.5 MG Tab, Take 0.5 mg by mouth at bedtime as needed for Sleep., Disp: , Rfl:     ** I have documented what I find to be significant in regards to past medical, social, family and surgical history  in my HPI or under PMH/PSH/FH review section, otherwise it is contributory **           HPI    Review of Systems   Gastrointestinal: Positive for nausea and vomiting.   All other systems reviewed and are negative.         Objective:     /74   Pulse (!) 123   Temp 37.8 °C (100.1 °F)   Resp 18   Wt 108.4 kg (239 lb)   SpO2 94%   BMI 38.58 kg/m²      Physical Exam  Vitals signs and nursing note reviewed.   Constitutional:       General: She is not in acute distress.     Appearance: She is well-developed. She is not diaphoretic.   HENT:      Head: Normocephalic and atraumatic.   Eyes:      Conjunctiva/sclera: Conjunctivae normal.   Cardiovascular:      Heart sounds: Normal heart sounds. No murmur.   Pulmonary:      Effort: Pulmonary effort is normal. No respiratory distress.      Breath sounds: Normal breath sounds.   Skin:     General: Skin is warm and dry.   Neurological:      Mental Status: She is alert.      Motor: No abnormal muscle tone.   Psychiatric:         Judgment: Judgment normal.                 Assessment/Plan:         1. Flu-like symptoms  ondansetron (ZOFRAN) syringe/vial injection 4 mg    ketorolac (TORADOL) injection 30 mg   2. Nausea and vomiting  POCT Urinalysis    POCT Pregnancy   3. Dehydration         * asked patient to go to ER for eval

## 2019-11-27 NOTE — DISCHARGE INSTRUCTIONS
Complete bedrest  Increase fluids, no dairy products for the next several days  Hot tea with lemon and honey  Cool mist humidifier at your bedside  Take your medications as directed with food  Take the nausea medication as needed  Take extra vitamin C and Echinacea to boost your immune system  Tylenol or ibuprofen for fever and/or body aches.  Follow-up with your primary care provider within the week for recheck and return if any problems or worsening.

## 2019-11-27 NOTE — ED TRIAGE NOTES
"Vanessa Sanchez  24 y.o.  female  Chief Complaint   Patient presents with   • Flu Like Symptoms     patient seen earlier today at  and was Dx with + Flu B. non stop cough / body aches and fever. patient was given Tamiflu and tessalon states \" I am sure I vomited that medication \".    • Vomiting     started today prior going to .        "

## 2019-11-27 NOTE — ED NOTES
Assist RN: Pt okay for d/c at this time per ERP. Discharge instructions reviewed with patient. Patient indicates understanding of discharge instructions, follow-up instructions, prescriptions x1, and reasons to return to ER. Patient denies needs or additional questions at this time, no PIV in place at this time. Pt ambulatory without difficulty out of ER in NAD with family.

## 2019-11-27 NOTE — PROGRESS NOTES
Subjective:      Vanessa Sanchez is a 24 y.o. female who presents with Wound Check (on left toe,needs steri-strips reapplied)        Patient presents to clinic for a wound check.  The patient was previously seen x2 days ago for a laceration of her left fourth toe.  The patient states the laceration was closed with Steri-Strips.  The patient is requesting reapplication of the Steri-Strips at this time.  The patient reports no pain or swelling to the injured area.  No redness.  No discharge/drainage.  The patient states the wound continued to bleed after her initial visit, but states this is now improved.  The patient reports an associated fever, but states she was recently diagnosed with influenza and believes her fever is likely related to the influenza virus.  The patient has not taken anything for her current symptoms.    Wound Check   She was originally treated 2 to 3 days ago. Previous treatment included laceration repair. There has been bloody (now improved) discharge from the wound. There is no redness present. There is no swelling present. There is no pain present. She has no difficulty moving the affected extremity or digit.     PMH:  has a past medical history of Anxiety, Asthma, Asthma, Attention deficit hyperactivity disorder (ADHD) (6/27/2019), Borderline personality disorder (HCC) (2014), Depression, Hypothyroidism (6/27/2019), Sleep apnea, Sleep apnea, and Snoring. She also has no past medical history of Addisons disease ([facility]), Adrenal disorder ([facility]), Allergy, Anemia, Arrhythmia, Arthritis, Blood transfusion without reported diagnosis, Cancer ([facility]), Cataract, CHF (congestive heart failure) ([facility]), Clotting disorder ([facility]), COPD (chronic obstructive pulmonary disease) ([facility]), Cushings syndrome ([facility]), Diabetes ([facility]), Diabetic neuropathy ([facility]), GERD (gastroesophageal reflux disease), Glaucoma, Goiter, Head ache, Heart attack ([facility]), Heart murmur, HIV (human immunodeficiency virus infection) ([facility]),  Hyperlipidemia, Hypertension, IBD (inflammatory bowel disease), Kidney disease, Meningitis, Migraine, Muscle disorder, Osteoporosis, Parathyroid disorder (HCC), Pituitary disease (HCC), Pulmonary emphysema (HCC), Seizure (HCC), Sickle cell disease (HCC), Stroke (HCC), Substance abuse (HCC), or Tuberculosis.  MEDS:   Current Outpatient Medications:   •  Oseltamivir Phosphate (TAMIFLU PO), Take  by mouth., Disp: , Rfl:   •  oseltamivir (TAMIFLU) 75 MG Cap, Take 75 mg by mouth 2 times a day., Disp: , Rfl:   •  ondansetron (ZOFRAN ODT) 4 MG TABLET DISPERSIBLE, Take 1 Tab by mouth every 6 hours as needed for Nausea., Disp: 10 Tab, Rfl: 0  •  estradiol (ESTRACE) 1 MG Tab, Take 0.5 Tabs by mouth every day., Disp: 10 Tab, Rfl: 1  •  busPIRone (BUSPAR) 10 MG Tab tablet, TAKE 1 TABLET BY MOUTH TWICE A DAY, Disp: , Rfl: 1  •  albuterol 108 (90 Base) MCG/ACT Aero Soln inhalation aerosol, Inhale 2 Puffs by mouth every 6 hours as needed for Shortness of Breath., Disp: , Rfl:   •  methylphenidate (RITALIN) 5 MG Tab, Take 10 mg by mouth 2 times a day., Disp: , Rfl:   •  venlafaxine (EFFEXOR-XR) 150 MG extended-release capsule, Take 1 Cap by mouth every day., Disp: 30 Cap, Rfl: 3  •  ALPRAZolam (XANAX) 0.5 MG Tab, Take 0.5 mg by mouth at bedtime as needed for Sleep., Disp: , Rfl:   ALLERGIES:   Allergies   Allergen Reactions   • Seasonal      SURGHX:   Past Surgical History:   Procedure Laterality Date   • PB REMOVE INTRAUTERINE DEVICE  7/29/2019    Procedure: REMOVAL, INTRAUTERINE DEVICE, HYSTEROSCOPY;  Surgeon: Jamil Rcik M.D.;  Location: SURGERY SAME DAY [facility];  Service: Obstetrics   • DILATION AND CURETTAGE N/A 7/29/2019    Procedure: DILATION AND CURETTAGE;  Surgeon: Jamil Rick M.D.;  Location: SURGERY SAME DAY [facility];  Service: Obstetrics   • TONSILLECTOMY AND ADENOIDECTOMY  2013   • OTHER      Turbinate reduction     SOCHX:  reports that she has been smoking. She has been smoking about 0.00  packs per day. She has never used smokeless tobacco. She reports current alcohol use. She reports current drug use. Drugs: Marijuana and Inhaled.  FH: Family history was reviewed, no pertinent findings to report      Review of Systems   Constitutional: Positive for fever (secondary to influenza B).   HENT: Positive for congestion (secondary to influenza B).    Eyes: Negative for discharge and redness.   Respiratory: Positive for cough (secondary to influenza B).    Gastrointestinal: Positive for vomiting (secondary to influenza B).   Musculoskeletal: Negative for myalgias.   Skin: Negative for rash.   Neurological: Negative for headaches.          Objective:     /86 (BP Location: Right arm)   Pulse 70   Temp 36.9 °C (98.4 °F) (Temporal)   Resp 18   Wt 108.4 kg (239 lb)   SpO2 96%   BMI 37.43 kg/m²      Physical Exam  Constitutional:       General: She is not in acute distress.     Appearance: Normal appearance. She is well-developed.   HENT:      Head: Normocephalic and atraumatic.      Right Ear: External ear normal.      Left Ear: External ear normal.      Nose: Nose normal.   Eyes:      Extraocular Movements: Extraocular movements intact.      Conjunctiva/sclera: Conjunctivae normal.   Neck:      Musculoskeletal: Normal range of motion and neck supple.   Cardiovascular:      Rate and Rhythm: Normal rate.   Pulmonary:      Effort: Pulmonary effort is normal.   Musculoskeletal:        Feet:       Comments:   Left 4th Toe:  Well-healing laceration to the distal aspect of the left fourth toe with associated damage to the nail.  No tenderness to palpation.  No swelling.  No surrounding erythema.  No increased warmth.  No discharge/drainage.  ROM intact -the patient is able to move her left fourth toe without difficulty.  ROM against resistance intact -no increasing pain with flexion/extension of the left fourth toe against resistance.  Neurovascular intact.   Skin:     General: Skin is warm and dry.    Neurological:      Mental Status: She is alert and oriented to person, place, and time.            Progress:  Wound Care:  Cleansed the area with an alcohol prep pad.  Reapplied Steri-Strips to the patient's wound.     Assessment/Plan:     1. Laceration of lesser toe of left foot without foreign body with damage to nail, subsequent encounter    Differential diagnoses, supportive care, and indications for immediate follow-up discussed with patient.   Instructed to return to clinic or nearest emergency department for any change in condition, further concerns, or worsening of symptoms.    OTC Tylenol or Motrin for fever/discomfort.  Keep laceration clean and dry  Allow wound to be open to the air for at least a portion of the day  Avoid soaking the wound  Monitor for signs of infection  Follow-up in 48 hours for reassessment  Return to clinic or go to the ED if symptoms worsen or fail to improve, or if patient should develop worsening/increasing/persistent pain/tenderness to the injured area, swelling, bruising, redness or warmth to the injured area, discharge/drainage from the wound, decreased range of motion, fever/chills, secondary signs of infection, and/or any concerning symptoms.    Discussed plan with the patient, and she agrees to the above.

## 2019-11-27 NOTE — ED PROVIDER NOTES
"ED Provider Note     Scribed for Annie Miranda D.O. by Sharon Garcia. 11/26/2019, 9:43 PM.     Primary care provider: RITA Brandt  Means of arrival: Walk-in         History obtained from: Patient  History limited by: None    CHIEF COMPLAINT  Chief Complaint   Patient presents with   • Flu Like Symptoms     patient seen earlier today at  and was Dx with + Flu B. non stop cough / body aches and fever. patient was given Tamiflu and tessalon states \" I am sure I vomited that medication \".    • Vomiting     started today prior going to .        HPI  Vanessa Sanchez is a 24 y.o. female who presents to the emergency Department for Influenza B. She states that she presented to Urgent care because of her persistent vomiting. She states that she tested positive for Influenza B. She has associated symptoms of dehydration, generalized abdominal pain, nausea, and vomiting. She states she has not been able to keep any food or water down in the past 24 hours because of her vomiting.  She denies any diarrhea but she has had generalized body aches, shaking chills and fever.  She was given Tessalon Perles and Tamiflu at urgent care but then proceeded to vomit them up so she was told to come here for some IV fluids.  She has had decreased urine output and what she did put out was very concentrated and dark.  She denies any possibility being pregnant.    REVIEW OF SYSTEMS  Pertinent positives include urinary retention, abdominal pain, nausea, and vomiting. Pertinent negatives include no fever.   See HPI for further details. All other systems are negative.    PAST MEDICAL HISTORY  Past Medical History:   Diagnosis Date   • Anxiety    • Asthma    • Asthma    • Attention deficit hyperactivity disorder (ADHD) 6/27/2019   • Borderline personality disorder (HCC) 2014   • Depression    • Hypothyroidism 6/27/2019   • Sleep apnea    • Sleep apnea     uses c pap   • Snoring        FAMILY HISTORY  Family History   Problem " Relation Age of Onset   • Diabetes Mother    • Stroke Mother    • Other Mother         fibromyalgia   • No Known Problems Father    • Diabetes Maternal Grandmother    • Hyperlipidemia Maternal Grandmother    • No Known Problems Sister        SOCIAL HISTORY  Social History     Tobacco Use   • Smoking status: Current Some Day Smoker     Packs/day: 0.00   • Smokeless tobacco: Never Used   • Tobacco comment: occass   Substance Use Topics   • Alcohol use: Yes     Comment: occass   • Drug use: Yes     Types: Marijuana, Inhaled     Comment: e cigarettes, Vaping, and MarijuAnna      Social History     Substance and Sexual Activity   Drug Use Yes   • Types: Marijuana, Inhaled    Comment: e cigarettes, Vaping, and MarijuAnna       SURGICAL HISTORY  Past Surgical History:   Procedure Laterality Date   • PB REMOVE INTRAUTERINE DEVICE  7/29/2019    Procedure: REMOVAL, INTRAUTERINE DEVICE, HYSTEROSCOPY;  Surgeon: Jamil Rick M.D.;  Location: SURGERY SAME DAY [facility];  Service: Obstetrics   • DILATION AND CURETTAGE N/A 7/29/2019    Procedure: DILATION AND CURETTAGE;  Surgeon: Jamil Rick M.D.;  Location: SURGERY SAME DAY [facility];  Service: Obstetrics   • TONSILLECTOMY AND ADENOIDECTOMY  2013   • OTHER      Turbinate reduction       CURRENT MEDICATIONS    Current Facility-Administered Medications:   •  NS infusion 2,000 mL, 2,000 mL, Intravenous, Once, Annie Miranda D.O.    Current Outpatient Medications:   •  Oseltamivir Phosphate (TAMIFLU PO), Take  by mouth., Disp: , Rfl:   •  oseltamivir (TAMIFLU) 75 MG Cap, Take 75 mg by mouth 2 times a day., Disp: , Rfl:   •  estradiol (ESTRACE) 1 MG Tab, Take 0.5 Tabs by mouth every day., Disp: 10 Tab, Rfl: 1  •  busPIRone (BUSPAR) 10 MG Tab tablet, TAKE 1 TABLET BY MOUTH TWICE A DAY, Disp: , Rfl: 1  •  albuterol 108 (90 Base) MCG/ACT Aero Soln inhalation aerosol, Inhale 2 Puffs by mouth every 6 hours as needed for Shortness of Breath., Disp: , Rfl:   •   "methylphenidate (RITALIN) 5 MG Tab, Take 10 mg by mouth 2 times a day., Disp: , Rfl:   •  venlafaxine (EFFEXOR-XR) 150 MG extended-release capsule, Take 1 Cap by mouth every day., Disp: 30 Cap, Rfl: 3  •  ALPRAZolam (XANAX) 0.5 MG Tab, Take 0.5 mg by mouth at bedtime as needed for Sleep., Disp: , Rfl:     ALLERGIES  Allergies   Allergen Reactions   • Seasonal        PHYSICAL EXAM  VITAL SIGNS: /72   Pulse (!) 109   Temp 36.8 °C (98.2 °F) (Temporal)   Resp 14   Ht 1.702 m (5' 7\")   Wt 108.5 kg (239 lb 3.2 oz)   SpO2 92%   BMI 37.46 kg/m²     Constitutional: Patient is well developed, well nourished, pale in moderate distress from her persistent cough and nausea.  HENT: Nasally congested, Normocephalic, atraumatic. Bilateral external auditory canals normal without drainage or cerumen. TM's visualized without erythema. Oropharynx dry without erythema or exudates.  Eyes: PERRL, EOMI, Conjunctiva without erythema or exudates.   Cardiovascular: Tachycardic without murmur  Thorax & Lungs: Clear and equal breath sounds with good excursion. No respiratory distress, no rhonchi, wheezing or rales.  Coarse breath sounds in the upper airways with coughing.  Abdomen: Bowel sounds normal in all four quadrants. Soft with generalized tenderness, no rebound or guarding, no flank tenderness or masses.  Skin: Pale, warm, Dry, No erythema, No rashes.       COURSE & MEDICAL DECISION MAKING  Pertinent Labs & Imaging studies reviewed. (See chart for details)  Laboratories were drawn at urgent care therefore I did not feel a need to repeat anything.  She is positive for influenza B.  9:43 PM - Patient seen and evaluated at bedside. Differential diagnoses include, but are not limited to, Influenza B vs dehydration.  Patient received IV fluids with 2 L of saline, Zofran, Toradol for her nausea and pain.  I also readministered the Tessalon Perles for her as she had vomited the previous dose up at urgent care.  Eventually she was " much better and her cough had completely resolved.  She states she feels much better after the IV fluids.  The plan will be to send her home with prescription for Zofran and Toradol.  She is to be at complete bedrest, increase fluids, vitamin C, Echinacea, continue the Tamiflu.  Follow-up with her primary care doctor within the week for recheck and return if any problems or worsening.  She is discharged in stable and improved condition.    HYDRATION: Based on the patient's presentation of Acute Vomiting and Dehydration the patient was given IV fluids. IV Hydration was used because oral hydration was not adequate alone.     FINAL IMPRESSION  Intractable nausea with vomiting  Dehydration  Persistent cough  Influenza B  Fever  Generalized body aches     Sharon GUY (Scribe), am scribing for, and in the presence of, Annie Miranda D.O..    Electronically signed by: Sharon Garcia (Evaibe), 11/26/2019    Annie GUY D.O. personally performed the services described in this documentation, as scribed by Sharon Garcia in my presence, and it is both accurate and complete. E.    The note accurately reflects work and decisions made by me.  Annie Miranda  11/27/2019  12:23 AM

## 2019-12-02 ENCOUNTER — TELEPHONE (OUTPATIENT)
Dept: OBGYN | Facility: CLINIC | Age: 24
End: 2019-12-02

## 2019-12-02 NOTE — TELEPHONE ENCOUNTER
12/02/19 2:31 PM    Pt called in regards to her still bleeding even after a trial of estrogen. Pt had nexplanon placed in July and has been on her period since. Per Dr. Coreas notes will put in referral for removal and place rx for Nuva ring. Sent message to Dr. Coreas for referral and let pt know when that is authed by insurance she will get a call to schedule. Pt understood and will call back next week to check in.

## 2019-12-03 DIAGNOSIS — N92.1 BREAKTHROUGH BLEEDING ON NEXPLANON: ICD-10-CM

## 2019-12-03 DIAGNOSIS — Z97.5 BREAKTHROUGH BLEEDING ON NEXPLANON: ICD-10-CM

## 2019-12-06 ENCOUNTER — GYNECOLOGY VISIT (OUTPATIENT)
Dept: OBGYN | Facility: CLINIC | Age: 24
End: 2019-12-06
Payer: COMMERCIAL

## 2019-12-06 VITALS — DIASTOLIC BLOOD PRESSURE: 68 MMHG | SYSTOLIC BLOOD PRESSURE: 118 MMHG | BODY MASS INDEX: 37.28 KG/M2 | WEIGHT: 238 LBS

## 2019-12-06 DIAGNOSIS — Z30.015 ENCOUNTER FOR INITIAL PRESCRIPTION OF VAGINAL RING HORMONAL CONTRACEPTIVE: ICD-10-CM

## 2019-12-06 DIAGNOSIS — N92.1 BREAKTHROUGH BLEEDING ON NEXPLANON: ICD-10-CM

## 2019-12-06 DIAGNOSIS — Z97.5 BREAKTHROUGH BLEEDING ON NEXPLANON: ICD-10-CM

## 2019-12-06 PROCEDURE — 11982 REMOVE DRUG IMPLANT DEVICE: CPT | Performed by: OBSTETRICS & GYNECOLOGY

## 2019-12-06 RX ORDER — ETONOGESTREL AND ETHINYL ESTRADIOL VAGINAL RING .015; .12 MG/D; MG/D
RING VAGINAL
Qty: 3 EACH | Refills: 3 | Status: SHIPPED | OUTPATIENT
Start: 2019-12-06 | End: 2020-11-17

## 2019-12-06 NOTE — PROGRESS NOTES
Pt here for Gyn exam for Nexplanon removal   # [phone]  Pt states has had a period since 10/12/19.  Nexplanon placed on 6/2019  Pt would like nuvering.

## 2019-12-06 NOTE — PROGRESS NOTES
Here for Nexplanon removal. Has had irregular bleeding with it and did not respond to estrogen therapy. Wants to do Nuvaring.

## 2019-12-06 NOTE — PROCEDURES
Nexplanon Removal :  Patient given informed consent, and is aware that removal may take longer, require more anesthesia and time.  It also can make a scar slightly, but not substantially larger, than that used for insertion .   Patient is aware that we recommend alternative contraception, until she achieves a normal cycle after removal. Patient is aware that removal, requires identification of the entire implant, either by visual inspection or by xray imagery.     After prepped and draping of left  upper inner forearm, implant visually identified and site of insertion marked.   Lidocaine 1% with epi infiltrated both distal and deep to the prior insertion site for anesthesia.   Utilizing a scalpel # 15, a small vertical incision was made near scar of prior insertion site in the site of maximal implant visualization.   Implant identified and capsule was incised with scalpel.  Implant grasped with mosquito forceps and was easily removed, examined and found to be intact/complete.     Hemostasis at removal site achieved with pressure.  Incision closed with Steri Strips. The forearm was then wrapped with a pressure dressing.   Instructions for post operative care given .   Patient tolerated the procedure well.     Remove outer adhesive roll gauze after 24 hrs and steri strips after 3-4 days.

## 2019-12-08 ENCOUNTER — HOSPITAL ENCOUNTER (OUTPATIENT)
Facility: MEDICAL CENTER | Age: 24
End: 2019-12-08
Attending: EMERGENCY MEDICINE | Admitting: SURGERY
Payer: COMMERCIAL

## 2019-12-08 ENCOUNTER — ANESTHESIA (OUTPATIENT)
Dept: SURGERY | Facility: MEDICAL CENTER | Age: 24
End: 2019-12-08
Payer: COMMERCIAL

## 2019-12-08 ENCOUNTER — ANESTHESIA EVENT (OUTPATIENT)
Dept: SURGERY | Facility: MEDICAL CENTER | Age: 24
End: 2019-12-08
Payer: COMMERCIAL

## 2019-12-08 VITALS
WEIGHT: 238.1 LBS | SYSTOLIC BLOOD PRESSURE: 118 MMHG | OXYGEN SATURATION: 99 % | TEMPERATURE: 97.6 F | HEIGHT: 68 IN | RESPIRATION RATE: 16 BRPM | HEART RATE: 79 BPM | BODY MASS INDEX: 36.09 KG/M2 | DIASTOLIC BLOOD PRESSURE: 79 MMHG

## 2019-12-08 DIAGNOSIS — G89.18 POSTOPERATIVE PAIN: ICD-10-CM

## 2019-12-08 DIAGNOSIS — K64.4 EXTERNAL HEMORRHOID, BLEEDING: ICD-10-CM

## 2019-12-08 DIAGNOSIS — K64.5 THROMBOSED HEMORRHOIDS: ICD-10-CM

## 2019-12-08 PROBLEM — G47.30 SLEEP APNEA: Status: ACTIVE | Noted: 2019-12-08

## 2019-12-08 LAB — HCG SERPL QL: NEGATIVE

## 2019-12-08 PROCEDURE — 160002 HCHG RECOVERY MINUTES (STAT): Performed by: SURGERY

## 2019-12-08 PROCEDURE — 700111 HCHG RX REV CODE 636 W/ 250 OVERRIDE (IP): Performed by: ANESTHESIOLOGY

## 2019-12-08 PROCEDURE — 160009 HCHG ANES TIME/MIN: Performed by: SURGERY

## 2019-12-08 PROCEDURE — 700101 HCHG RX REV CODE 250: Performed by: SURGERY

## 2019-12-08 PROCEDURE — 303977 HCHG I & D

## 2019-12-08 PROCEDURE — 500423 HCHG DRESSING, ABD COMBINE: Performed by: SURGERY

## 2019-12-08 PROCEDURE — 700105 HCHG RX REV CODE 258: Performed by: ANESTHESIOLOGY

## 2019-12-08 PROCEDURE — 700101 HCHG RX REV CODE 250: Performed by: ANESTHESIOLOGY

## 2019-12-08 PROCEDURE — 96372 THER/PROPH/DIAG INJ SC/IM: CPT

## 2019-12-08 PROCEDURE — 160035 HCHG PACU - 1ST 60 MINS PHASE I: Performed by: SURGERY

## 2019-12-08 PROCEDURE — 84703 CHORIONIC GONADOTROPIN ASSAY: CPT

## 2019-12-08 PROCEDURE — 99291 CRITICAL CARE FIRST HOUR: CPT

## 2019-12-08 PROCEDURE — 88304 TISSUE EXAM BY PATHOLOGIST: CPT

## 2019-12-08 PROCEDURE — 700101 HCHG RX REV CODE 250: Performed by: EMERGENCY MEDICINE

## 2019-12-08 PROCEDURE — 700111 HCHG RX REV CODE 636 W/ 250 OVERRIDE (IP): Performed by: EMERGENCY MEDICINE

## 2019-12-08 PROCEDURE — 160046 HCHG PACU - 1ST 60 MINS PHASE II: Performed by: SURGERY

## 2019-12-08 PROCEDURE — 160028 HCHG SURGERY MINUTES - 1ST 30 MINS LEVEL 3: Performed by: SURGERY

## 2019-12-08 PROCEDURE — 160048 HCHG OR STATISTICAL LEVEL 1-5: Performed by: SURGERY

## 2019-12-08 PROCEDURE — 160025 RECOVERY II MINUTES (STATS): Performed by: SURGERY

## 2019-12-08 PROCEDURE — 160039 HCHG SURGERY MINUTES - EA ADDL 1 MIN LEVEL 3: Performed by: SURGERY

## 2019-12-08 PROCEDURE — G0378 HOSPITAL OBSERVATION PER HR: HCPCS

## 2019-12-08 PROCEDURE — 501838 HCHG SUTURE GENERAL: Performed by: SURGERY

## 2019-12-08 RX ORDER — BUPIVACAINE HYDROCHLORIDE AND EPINEPHRINE 5; 5 MG/ML; UG/ML
INJECTION, SOLUTION EPIDURAL; INTRACAUDAL; PERINEURAL
Status: DISCONTINUED | OUTPATIENT
Start: 2019-12-08 | End: 2019-12-08 | Stop reason: HOSPADM

## 2019-12-08 RX ORDER — HYDROMORPHONE HYDROCHLORIDE 1 MG/ML
0.2 INJECTION, SOLUTION INTRAMUSCULAR; INTRAVENOUS; SUBCUTANEOUS
Status: DISCONTINUED | OUTPATIENT
Start: 2019-12-08 | End: 2019-12-08 | Stop reason: HOSPADM

## 2019-12-08 RX ORDER — BUPIVACAINE HYDROCHLORIDE AND EPINEPHRINE 5; 5 MG/ML; UG/ML
5 INJECTION, SOLUTION EPIDURAL; INTRACAUDAL; PERINEURAL ONCE
Status: COMPLETED | OUTPATIENT
Start: 2019-12-08 | End: 2019-12-08

## 2019-12-08 RX ORDER — CEFAZOLIN SODIUM 1 G/3ML
INJECTION, POWDER, FOR SOLUTION INTRAMUSCULAR; INTRAVENOUS PRN
Status: DISCONTINUED | OUTPATIENT
Start: 2019-12-08 | End: 2019-12-08 | Stop reason: SURG

## 2019-12-08 RX ORDER — HYDROMORPHONE HYDROCHLORIDE 1 MG/ML
0.4 INJECTION, SOLUTION INTRAMUSCULAR; INTRAVENOUS; SUBCUTANEOUS
Status: DISCONTINUED | OUTPATIENT
Start: 2019-12-08 | End: 2019-12-08 | Stop reason: HOSPADM

## 2019-12-08 RX ORDER — MEPERIDINE HYDROCHLORIDE 25 MG/ML
12.5 INJECTION INTRAMUSCULAR; INTRAVENOUS; SUBCUTANEOUS
Status: DISCONTINUED | OUTPATIENT
Start: 2019-12-08 | End: 2019-12-08 | Stop reason: HOSPADM

## 2019-12-08 RX ORDER — OXYCODONE HCL 5 MG/5 ML
10 SOLUTION, ORAL ORAL
Status: DISCONTINUED | OUTPATIENT
Start: 2019-12-08 | End: 2019-12-08 | Stop reason: HOSPADM

## 2019-12-08 RX ORDER — LIDOCAINE HYDROCHLORIDE 10 MG/ML
20 INJECTION, SOLUTION INFILTRATION; PERINEURAL ONCE
Status: COMPLETED | OUTPATIENT
Start: 2019-12-08 | End: 2019-12-08

## 2019-12-08 RX ORDER — ONDANSETRON 2 MG/ML
4 INJECTION INTRAMUSCULAR; INTRAVENOUS
Status: COMPLETED | OUTPATIENT
Start: 2019-12-08 | End: 2019-12-08

## 2019-12-08 RX ORDER — HYDROMORPHONE HYDROCHLORIDE 1 MG/ML
1 INJECTION, SOLUTION INTRAMUSCULAR; INTRAVENOUS; SUBCUTANEOUS ONCE
Status: COMPLETED | OUTPATIENT
Start: 2019-12-08 | End: 2019-12-08

## 2019-12-08 RX ORDER — HYDRALAZINE HYDROCHLORIDE 20 MG/ML
5 INJECTION INTRAMUSCULAR; INTRAVENOUS
Status: DISCONTINUED | OUTPATIENT
Start: 2019-12-08 | End: 2019-12-08 | Stop reason: HOSPADM

## 2019-12-08 RX ORDER — LIDOCAINE HYDROCHLORIDE 20 MG/ML
INJECTION, SOLUTION EPIDURAL; INFILTRATION; INTRACAUDAL; PERINEURAL PRN
Status: DISCONTINUED | OUTPATIENT
Start: 2019-12-08 | End: 2019-12-08 | Stop reason: SURG

## 2019-12-08 RX ORDER — DEXAMETHASONE SODIUM PHOSPHATE 4 MG/ML
INJECTION, SOLUTION INTRA-ARTICULAR; INTRALESIONAL; INTRAMUSCULAR; INTRAVENOUS; SOFT TISSUE PRN
Status: DISCONTINUED | OUTPATIENT
Start: 2019-12-08 | End: 2019-12-08 | Stop reason: SURG

## 2019-12-08 RX ORDER — ONDANSETRON 2 MG/ML
INJECTION INTRAMUSCULAR; INTRAVENOUS PRN
Status: DISCONTINUED | OUTPATIENT
Start: 2019-12-08 | End: 2019-12-08 | Stop reason: SURG

## 2019-12-08 RX ORDER — SODIUM CHLORIDE, SODIUM LACTATE, POTASSIUM CHLORIDE, CALCIUM CHLORIDE 600; 310; 30; 20 MG/100ML; MG/100ML; MG/100ML; MG/100ML
INJECTION, SOLUTION INTRAVENOUS
Status: DISCONTINUED | OUTPATIENT
Start: 2019-12-08 | End: 2019-12-08 | Stop reason: SURG

## 2019-12-08 RX ORDER — SODIUM CHLORIDE, SODIUM LACTATE, POTASSIUM CHLORIDE, CALCIUM CHLORIDE 600; 310; 30; 20 MG/100ML; MG/100ML; MG/100ML; MG/100ML
1000 INJECTION, SOLUTION INTRAVENOUS CONTINUOUS
Status: DISCONTINUED | OUTPATIENT
Start: 2019-12-08 | End: 2019-12-08 | Stop reason: HOSPADM

## 2019-12-08 RX ORDER — HYDROMORPHONE HYDROCHLORIDE 1 MG/ML
0.1 INJECTION, SOLUTION INTRAMUSCULAR; INTRAVENOUS; SUBCUTANEOUS
Status: DISCONTINUED | OUTPATIENT
Start: 2019-12-08 | End: 2019-12-08 | Stop reason: HOSPADM

## 2019-12-08 RX ORDER — METOCLOPRAMIDE HYDROCHLORIDE 5 MG/ML
INJECTION INTRAMUSCULAR; INTRAVENOUS PRN
Status: DISCONTINUED | OUTPATIENT
Start: 2019-12-08 | End: 2019-12-08 | Stop reason: SURG

## 2019-12-08 RX ORDER — OXYCODONE HCL 5 MG/5 ML
5 SOLUTION, ORAL ORAL
Status: DISCONTINUED | OUTPATIENT
Start: 2019-12-08 | End: 2019-12-08 | Stop reason: HOSPADM

## 2019-12-08 RX ORDER — LORAZEPAM 2 MG/ML
0.5 INJECTION INTRAMUSCULAR
Status: DISCONTINUED | OUTPATIENT
Start: 2019-12-08 | End: 2019-12-08 | Stop reason: HOSPADM

## 2019-12-08 RX ORDER — MIDAZOLAM HYDROCHLORIDE 1 MG/ML
INJECTION INTRAMUSCULAR; INTRAVENOUS PRN
Status: DISCONTINUED | OUTPATIENT
Start: 2019-12-08 | End: 2019-12-08 | Stop reason: SURG

## 2019-12-08 RX ORDER — DIPHENHYDRAMINE HYDROCHLORIDE 50 MG/ML
12.5 INJECTION INTRAMUSCULAR; INTRAVENOUS
Status: DISCONTINUED | OUTPATIENT
Start: 2019-12-08 | End: 2019-12-08 | Stop reason: HOSPADM

## 2019-12-08 RX ORDER — ONDANSETRON 4 MG/1
4 TABLET, ORALLY DISINTEGRATING ORAL ONCE
Status: COMPLETED | OUTPATIENT
Start: 2019-12-08 | End: 2019-12-08

## 2019-12-08 RX ORDER — OXYCODONE AND ACETAMINOPHEN 7.5; 325 MG/1; MG/1
1 TABLET ORAL EVERY 6 HOURS PRN
Qty: 10 TAB | Refills: 0 | Status: SHIPPED | OUTPATIENT
Start: 2019-12-08 | End: 2019-12-15

## 2019-12-08 RX ADMIN — SODIUM CHLORIDE, POTASSIUM CHLORIDE, SODIUM LACTATE AND CALCIUM CHLORIDE: 600; 310; 30; 20 INJECTION, SOLUTION INTRAVENOUS at 13:37

## 2019-12-08 RX ADMIN — HYDROMORPHONE HYDROCHLORIDE 1 MG: 1 INJECTION, SOLUTION INTRAMUSCULAR; INTRAVENOUS; SUBCUTANEOUS at 07:59

## 2019-12-08 RX ADMIN — ONDANSETRON 4 MG: 2 INJECTION INTRAMUSCULAR; INTRAVENOUS at 13:45

## 2019-12-08 RX ADMIN — ONDANSETRON 4 MG: 4 TABLET, ORALLY DISINTEGRATING ORAL at 07:59

## 2019-12-08 RX ADMIN — CEFAZOLIN 2 G: 330 INJECTION, POWDER, FOR SOLUTION INTRAMUSCULAR; INTRAVENOUS at 13:41

## 2019-12-08 RX ADMIN — MIDAZOLAM 2 MG: 1 INJECTION INTRAMUSCULAR; INTRAVENOUS at 13:37

## 2019-12-08 RX ADMIN — DEXAMETHASONE SODIUM PHOSPHATE 8 MG: 4 INJECTION, SOLUTION INTRA-ARTICULAR; INTRALESIONAL; INTRAMUSCULAR; INTRAVENOUS; SOFT TISSUE at 13:45

## 2019-12-08 RX ADMIN — BUPIVACAINE HYDROCHLORIDE AND EPINEPHRINE 5 ML: 5; 5 INJECTION, SOLUTION EPIDURAL; INTRACAUDAL; PERINEURAL at 10:00

## 2019-12-08 RX ADMIN — FENTANYL CITRATE 100 MCG: 50 INJECTION, SOLUTION INTRAMUSCULAR; INTRAVENOUS at 13:41

## 2019-12-08 RX ADMIN — LIDOCAINE HYDROCHLORIDE 20 ML: 10 INJECTION, SOLUTION INFILTRATION; PERINEURAL at 08:15

## 2019-12-08 RX ADMIN — LIDOCAINE HYDROCHLORIDE 100 MG: 20 INJECTION, SOLUTION EPIDURAL; INFILTRATION; INTRACAUDAL at 13:41

## 2019-12-08 RX ADMIN — PROPOFOL 200 MG: 10 INJECTION, EMULSION INTRAVENOUS at 13:41

## 2019-12-08 RX ADMIN — METOCLOPRAMIDE 10 MG: 5 INJECTION, SOLUTION INTRAMUSCULAR; INTRAVENOUS at 13:45

## 2019-12-08 RX ADMIN — ONDANSETRON 4 MG: 2 INJECTION INTRAMUSCULAR; INTRAVENOUS at 14:40

## 2019-12-08 ASSESSMENT — PAIN SCALES - WONG BAKER
WONGBAKER_NUMERICALRESPONSE: DOESN'T HURT AT ALL
WONGBAKER_NUMERICALRESPONSE: HURTS AS MUCH AS POSSIBLE

## 2019-12-08 ASSESSMENT — ENCOUNTER SYMPTOMS: DIARRHEA: 1

## 2019-12-08 ASSESSMENT — PAIN DESCRIPTION - DESCRIPTORS: DESCRIPTORS: ACHING

## 2019-12-08 ASSESSMENT — PAIN SCALES - GENERAL: PAIN_LEVEL: 0

## 2019-12-08 NOTE — PROGRESS NOTES
Surgical Progress Note    Author: Manolo Shoemaker Date & Time created: 2019   11:52 AM     Interval Events:    Review of Systems   Gastrointestinal: Positive for diarrhea.        Painful hemorrhoid     Hemodynamics:  Temp (24hrs), Av.7 °C (98.1 °F), Min:36.7 °C (98.1 °F), Max:36.7 °C (98.1 °F)  Temperature: 36.7 °C (98.1 °F)  Pulse  Av.8  Min: 84  Max: 124   Blood Pressure: 112/76     Respiratory:    Respiration: 20, Pulse Oximetry: 97 %           Neuro:  GCS       Fluids:  No intake or output data in the 24 hours ending 19 1152  Weight: 108 kg (238 lb 1.6 oz)  No Active Diet Orders    Physical Exam  Cardiovascular:      Rate and Rhythm: Normal rate.   Pulmonary:      Effort: Pulmonary effort is normal.   Abdominal:      General: Abdomen is flat. Bowel sounds are normal.      Palpations: Abdomen is soft.   Genitourinary:     Comments: Has large external thrombosed hemorrhoid that is actively bleeding after being lanced.      Labs:  No results found for this or any previous visit (from the past 24 hour(s)).  Medical Decision Making, by Problem:  There are no active hospital problems to display for this patient.    Plan:  To OR for excision of external hemorrhoid due to active bleeding.    Quality Measures:  Quality-Core Measures    Discussed patient condition with Family and Patient

## 2019-12-08 NOTE — ANESTHESIA TIME REPORT
Anesthesia Start and Stop Event Times     Date Time Event    12/8/2019 1332 Ready for Procedure     1337 Anesthesia Start     1425 Anesthesia Stop        Responsible Staff  12/08/19    Name Role Begin End    Jeses Mills M.D. Anesth 1337 1425        Preop Diagnosis (Free Text):  Pre-op Diagnosis     thrombosed hemorrhoid        Preop Diagnosis (Codes):    Post op Diagnosis  External bleeding hemorrhoids      Premium Reason  E. Weekend    Comments: Rectal EUA; Excision of thrombosed external hemorrhoids

## 2019-12-08 NOTE — ED TRIAGE NOTES
Patient had a hemorrhoid that started yesterday, increasing pain thru out the evening.  Patient is crying, visibly upset.

## 2019-12-08 NOTE — ED NOTES
Patient in a position of comfort on her side, states large hemorrhoid that came up yesterday causing pain at a 6 on 0 to 10 scale, has been applying cream and taking baths

## 2019-12-08 NOTE — ANESTHESIA PROCEDURE NOTES
Airway  Date/Time: 12/8/2019 1:41 PM  Performed by: Jesse Mills M.D.  Authorized by: Jesse Mills M.D.     Location:  OR  Urgency:  Elective  Difficult Airway: No    Indications for Airway Management:  Anesthesia  Spontaneous Ventilation: absent    Sedation Level:  Deep  Preoxygenated: Yes    Mask Difficulty Assessment:  2 - vent by mask + OA or adjuvant +/- NMBA  Final Airway Type:  Supraglottic airway  Final Supraglottic Airway:  Standard LMA  SGA Size:  4  Number of Attempts at Approach:  1  Number of Other Approaches Attempted:  0

## 2019-12-08 NOTE — DISCHARGE INSTRUCTIONS
ACTIVITY: Rest and take it easy for the first 24 hours.  A responsible adult is recommended to remain with you during that time.  It is normal to feel sleepy.  We encourage you to not do anything that requires balance, judgment or coordination.    MILD FLU-LIKE SYMPTOMS ARE NORMAL. YOU MAY EXPERIENCE GENERALIZED MUSCLE ACHES, THROAT IRRITATION, HEADACHE AND/OR SOME NAUSEA.    FOR 24 HOURS DO NOT:  Drive, operate machinery or run household appliances.  Drink beer or alcoholic beverages.   Make important decisions or sign legal documents.    SPECIAL INSTRUCTIONS:   Follow up: 7-10 days   Activity: no heavy lifting x 2 weeks, avoid prolong sitting on toilet   Diet:regular Sitz bathes tid as needed for comfort   No driving for one week or while on pain medications   Wound Care:keep area clean after bowel movements.    DIET: To avoid nausea, slowly advance diet as tolerated, avoiding spicy or greasy foods for the first day.  Add more substantial food to your diet according to your physician's instructions.  Babies can be fed formula or breast milk as soon as they are hungry.  INCREASE FLUIDS AND FIBER TO AVOID CONSTIPATION.    SURGICAL DRESSING/BATHING: see above    FOLLOW-UP APPOINTMENT:  A follow-up appointment should be arranged with your doctor in 7-10 days; call to schedule.    You should CALL YOUR PHYSICIAN if you develop:  Fever greater than 101 degrees F.  Pain not relieved by medication, or persistent nausea or vomiting.  Excessive bleeding (blood soaking through dressing) or unexpected drainage from the wound.  Extreme redness or swelling around the incision site, drainage of pus or foul smelling drainage.  Inability to urinate or empty your bladder within 8 hours.  Problems with breathing or chest pain.    You should call 911 if you develop problems with breathing or chest pain.  If you are unable to contact your doctor or surgical center, you should go to the nearest emergency room or urgent care center.   Physician's telephone #: [phone]    If any questions arise, call your doctor.  If your doctor is not available, please feel free to call the Surgical Center at [phone].  The Center is open Monday through Friday from 7AM to 7PM.  You can also call the HEALTH HOTLINE open 24 hours/day, 7 days/week and speak to a nurse at [phone], or toll free at [phone].    A registered nurse may call you a few days after your surgery to see how you are doing after your procedure.    MEDICATIONS: Resume taking daily medication.  Take prescribed pain medication with food.  If no medication is prescribed, you may take non-aspirin pain medication if needed.  PAIN MEDICATION CAN BE VERY CONSTIPATING.  Take a stool softener or laxative such as senokot, pericolace, or milk of magnesia if needed.    Prescription given for oxycodone, rectal cream.  Can take anytime.    If your physician has prescribed pain medication that includes Acetaminophen (Tylenol), do not take additional Acetaminophen (Tylenol) while taking the prescribed medication.    Depression / Suicide Risk    As you are discharged from this [facility] facility, it is important to learn how to keep safe from harming yourself.    Recognize the warning signs:  · Abrupt changes in personality, positive or negative- including increase in energy   · Giving away possessions  · Change in eating patterns- significant weight changes-  positive or negative  · Change in sleeping patterns- unable to sleep or sleeping all the time   · Unwillingness or inability to communicate  · Depression  · Unusual sadness, discouragement and loneliness  · Talk of wanting to die  · Neglect of personal appearance   · Rebelliousness- reckless behavior  · Withdrawal from people/activities they love  · Confusion- inability to concentrate     If you or a loved one observes any of these behaviors or has concerns about self-harm, here's what you can do:  · Talk about it- your feelings and  reasons for harming yourself  · Remove any means that you might use to hurt yourself (examples: pills, rope, extension cords, firearm)  · Get professional help from the community (Mental Health, Substance Abuse, psychological counseling)  · Do not be alone:Call your Safe Contact- someone whom you trust who will be there for you.  · Call your local CRISIS HOTLINE 393-8238 or 289-720-8696  · Call your local Children's Mobile Crisis Response Team [place] (857) 417-9678 or www.VGBio  · Call the toll free National Suicide Prevention Hotlines   · National Suicide Prevention Lifeline 830-566-LCDQ (5573)  · National Hope Line Network 800-SUICIDE (263-3681)

## 2019-12-08 NOTE — H&P
DATE OF SERVICE:  12/08/2019    HISTORY OF PRESENT ILLNESS:  The patient is a 24-year-old female who has had   some diarrhea recently causing her to be on the toilet for extended periods of   time and has developed a thrombosed external hemorrhoid, which was attempted   to be drained and lanced in the ER.  She is still in significant pain and now   she is bleeding from the lancing site that has not stopped in 2 hours and I   was asked to see the patient.    PAST MEDICAL HISTORY:  She has borderline personality disorder, attention   deficit hyperactivity disorder, depression, hypothyroidism, obesity, and sleep   apnea.    PAST SURGICAL HISTORY:  Tonsillectomy and sinus surgery.    MEDICATIONS ON ADMISSION:  She takes albuterol inhaler as needed p.r.n. for   shortness of breath, Xanax 0.5 mg p.r.n. as needed for sleep, BuSpar 10 mg   b.i.d., Ritalin 5 mg 10 tablets 2 tabs b.i.d., and Effexor 150 mg extended   release capsules.    SOCIAL HISTORY:  Smokes cigarettes and marijuana, drinks socially.  Denies any   other drug use.  She is a college student.    REVIEW OF SYSTEMS:  The patient has been in her normal health until the past   24 hours when she has developed a painful external thrombosed hemorrhoid that   has not resolved with over-the-counter medications.  Attempts to lanced in the   ER has resulted in bleeding that is not stopping despite pressure.  All other   systems are negative and noncontributory.    PHYSICAL EXAMINATION:  VITAL SIGNS:  Temperature of 36.7, heart rate of 86, respirations 20, blood   pressure 112/76.  HEAD AND NECK:  Pupils are equal, round, reactive to light.  Extraocular   movements are intact.  No scleral icterus.  NECK:  No cervical adenopathy.  LUNGS:  Clear bilaterally without wheezes, rales or rhonchi.  Normal chest   wall expansion.  HEART:  Regular rate and rhythm without murmurs, rubs or gallops.  No carotid   bruits.  No jugular venous distention.  ABDOMEN:  Soft, nontender,  nondistended.  GENITOURINARY:  Perianal exam shows on the left lateral side of the anus, a   multi external vein thrombosed area with a clot that is actively oozing blood   from the recent david attempt.  It is not stopping with pressure.  MUSCULOSKELETAL:  Moves all 4 extremities, normal range of motion.  Strength   grossly normal.  NEUROLOGICAL:  Cranial nerves II-XII are grossly intact.  Sensation grossly   normal.  PSYCHIATRIC:  Alert and oriented x3.  Mood and affect are appropriate.  SKIN:  Normal texture, pigmentation and turgor.  No adenopathy.    LABORATORY DATA:  No labs were done.    DIAGNOSTIC DATA:  No x-rays were done.    IMPRESSION:  1.  Bleeding external hemorrhoid, needs excision and control of bleeding.  2.  History of depression and anxiety.  3.  Obesity.    PLAN:  I have explained the risks and benefits of the surgery to her including   bleeding, infection, injury to the sphincters.  They understand these risks   and agreed to proceed.       ____________________________________     MD CHIRAG Duque / NTS    DD:  12/08/2019 12:07:05  DT:  12/08/2019 12:22:54    D#:  [id number]  Job#:  [id number]

## 2019-12-08 NOTE — ANESTHESIA POSTPROCEDURE EVALUATION
Patient: Vanessa Sanchez    Procedure Summary     Date:  12/08/19 Room / Location:  [facility] 09 / SURGERY [facility]    Anesthesia Start:  1337 Anesthesia Stop:  1425    Procedure:  HEMORRHOIDECTOMY (Anus) Diagnosis:  (thrombosed hemorrhoid)    Surgeon:  Manolo Shoemaker M.D. Responsible Provider:  Jesse Mills M.D.    Anesthesia Type:  general ASA Status:  2          Final Anesthesia Type: general  Last vitals  BP   Blood Pressure: 118/79    Temp   36.4 °C (97.6 °F)    Pulse   Pulse: 79   Resp   16    SpO2   99 %      Anesthesia Post Evaluation    Patient location during evaluation: PACU  Patient participation: complete - patient participated  Level of consciousness: awake and alert  Pain score: 0    Airway patency: patent  Anesthetic complications: no  Cardiovascular status: hemodynamically stable  Respiratory status: acceptable  Hydration status: euvolemic    PONV: none           Nurse Pain Score: 0 (NPRS)

## 2019-12-08 NOTE — ANESTHESIA QCDR
2019 [facility] Clinical Data Registry (for Quality Improvement)     Postoperative nausea/vomiting risk protocol (Adult = 18 yrs and Pediatric 3-17 yrs)- (430 and 463)  General inhalation anesthetic (NOT TIVA) with PONV risk factors: Yes  Provision of anti-emetic therapy with at least 2 different classes of agents: Yes   Patient DID NOT receive anti-emetic therapy and reason is documented in Medical Record:  N/A    Multimodal Pain Management- (AQI59)  Patient undergoing Elective Surgery (i.e. Outpatient, or ASC, or Prescheduled Surgery prior to Hospital Admission): No  Use of Multimodal Pain Management, two or more drugs and/or interventions, NOT including systemic opioids: N/A  Exception: Documented allergy to multiple classes of analgesics: N/A    PACU assessment of acute postoperative pain prior to Anesthesia Care End- Applies to Patients Age = 18- (ABG7)  Initial PACU pain score is which of the following: < 7/10  Patient unable to report pain score: N/A    Post-anesthetic transfer of care checklist/protocol to PACU/ICU- (426 and 427)  Upon conclusion of case, patient transferred to which of the following locations: PACU/Non-ICU  Use of transfer checklist/protocol: Yes  Exclusion: Service Performed in Patient Hospital Room (and thus did not require transfer): N/A    PACU Reintubation- (AQI31)  General anesthesia requiring endotracheal intubation (ETT) along with subsequent extubation in OR or PACU: No  Required reintubation in the PACU: N/A  Extubation was a planned trial documented in the medical record prior to removal of the original airway device: N/A    Unplanned admission to ICU related to anesthesia service up through end of PACU care- (MD51)  Unplanned admission to ICU (not initially anticipated at anesthesia start time): No

## 2019-12-08 NOTE — ED PROVIDER NOTES
ED Provider Note    Scribed for Annie Rich M.D. by Anjali Lopez. 12/8/2019, 7:40 AM.    Primary care provider: RITA Brandt  Means of arrival: Walk-in  History obtained from: Patient  History limited by: None    CHIEF COMPLAINT  Chief Complaint   Patient presents with   • Rectal Pain       HPI  Vanessa Sanchez is a 24 y.o. female who presents to the Emergency Department complaining of severe acute rectal pain onset 1 day ago. Patient states she started having pain to her rectal region yesterday but woke up at 4 AM today with severe pain. She notes she has been taking baths, icing the area, and taking tylenol with no alleviation to her pain. The patient denies any associated fevers or vomiting.  She denies constipation.  She has been trying stool softeners and also over-the-counter hemorrhoid cream without alleviation of her symptoms.  She denies any current bleeding.  She denies a bleeding disorder.    REVIEW OF SYSTEMS  Pertinent positives include rectal pain. Pertinent negatives include no fevers or vomiting, abdominal pain, constipation, bleeding problems. All other systems negative.     PAST MEDICAL HISTORY   has a past medical history of Anxiety, Asthma, Asthma, Attention deficit hyperactivity disorder (ADHD) (6/27/2019), Borderline personality disorder (HCC) (2014), Depression, Hypothyroidism (6/27/2019), Sleep apnea, Sleep apnea, and Snoring.    SURGICAL HISTORY   has a past surgical history that includes tonsillectomy and adenoidectomy (2013); other; remove intrauterine device (7/29/2019); and dilation and curettage (N/A, 7/29/2019).    SOCIAL HISTORY  Social History     Tobacco Use   • Smoking status: Current Some Day Smoker     Packs/day: 0.00   • Smokeless tobacco: Never Used   • Tobacco comment: occass   Substance Use Topics   • Alcohol use: Yes     Comment: occass   • Drug use: Yes     Types: Marijuana, Inhaled     Comment: e cigarettes, Vaping, and MarijuAnna      Social History  "    Substance and Sexual Activity   Drug Use Yes   • Types: Marijuana, Inhaled    Comment: e cigarettes, Vaping, and MarijuAnna       FAMILY HISTORY  Family History   Problem Relation Age of Onset   • Diabetes Mother    • Stroke Mother    • Other Mother         fibromyalgia   • No Known Problems Father    • Diabetes Maternal Grandmother    • Hyperlipidemia Maternal Grandmother    • No Known Problems Sister        CURRENT MEDICATIONS  Home Medications     Reviewed by Talita Price R.N. (Registered Nurse) on 12/08/19 at 0712  Med List Status: Not Addressed   Medication Last Dose Status   albuterol 108 (90 Base) MCG/ACT Aero Soln inhalation aerosol  Active   ALPRAZolam (XANAX) 0.5 MG Tab  Active   busPIRone (BUSPAR) 10 MG Tab tablet  Active   estradiol (ESTRACE) 1 MG Tab  Active   ethinyl estradiol-etonogestrel (NUVARING) 0.12-0.015 MG/24HR vaginal ring  Active   methylphenidate (RITALIN) 5 MG Tab  Active   ondansetron (ZOFRAN ODT) 4 MG TABLET DISPERSIBLE  Active   oseltamivir (TAMIFLU) 75 MG Cap  Active   Oseltamivir Phosphate (TAMIFLU PO)  Active   venlafaxine (EFFEXOR-XR) 150 MG extended-release capsule  Active                ALLERGIES  Allergies   Allergen Reactions   • Seasonal        PHYSICAL EXAM  VITAL SIGNS: BP (!) 162/100   Pulse (!) 124   Temp 36.7 °C (98.1 °F) (Temporal)   Resp (!) 22   Ht 1.727 m (5' 8\")   Wt 108 kg (238 lb 1.6 oz)   SpO2 98%   BMI 36.20 kg/m²     Constitutional: Alert in moderate distress  HENT: Normocephalic, Bilateral external ears normal. Nose normal.   Eyes: Pupils are equal and reactive. Conjunctiva normal, non-icteric.   Thorax & Lungs: Easy unlabored respirations  Abdomen:  No gross signs of peritonitis, no pain with movement   Rectal: Large thrombosed hemorrhoid at 9 o'clock.   Skin: Visualized skin is  Dry, No erythema, No rash.   Extremities:   No edema, No asymmetry  Neurologic: Alert, Grossly non-focal.   Psychiatric: Affect and Mood normal    DIAGNOSTIC STUDIES / " PROCEDURES    Hemorrhoid Incision Procedure Note    Indication: thrombosed hemorrhoid     Procedure: The patient was positioned appropriately and the skin over the incision site was prepped with betadine and draped in a sterile fashion. Local anesthesia was obtained by infiltration using 3 cc of 1% Lidocaine without epinephrine into the hemorrhoid. An elliptical  incision was then made over the hemorrhoid with removal of clot. Minimal bleeding noted. The patient was then monitored for pain or worsening symptoms or persistent bleeding.    The patient tolerated the procedure well.    Complications: Minimal bleeding    COURSE & MEDICAL DECISION MAKING  Nursing notes, VS, PMSFHx reviewed in chart.    7:40 AM - Patient seen and examined at bedside. Discussed with patient that she has a hemorrhoid, which is  thrombosed. I will give her an injection for her pain for her to get more comfortable, then decided whether to incise the hemorrhoid. Patient consents to plan of care. Patient will be treated with Dilaudid IM 1 mg, Zofran ODT 4 mg, and Xylocaine 1% injection.     8:20 AM - Updated patient I will do a hemorrhoid incision procedure for her hemorrhoid. Patient consents to plan.     8:40 AM - Performed hemorrhoid incision procedure as outlined above.     9:39 AM - Patient was reevaluated at bedside. Patient states she is still in pain and rectal bleeding. Discussed with patient I will give her a longer dose of numbing injection with epinephrine to help with the bleeding. Patient agrees. Patient will be treated with bupivacaine-epinephrine 0.5% 5 mL.     10:15 AM - I injected patient with 3 cc of Sensorcaine with epinephrine for bleeding.     10:28 AM - Paged Surgery.     10:32 AM - I discussed the patient's case and the above findings with Dr. Shoemaker (Surgery) who agrees to see patient in the ED. Updated patient on plan of care.  Surgery was consulted due to patient's persistent pain, failing outpatient treatment of the  hemorrhoid and bleeding post procedure.    11:49 AM - Dr. Shoemaker will take patient to surgery.     DISPOSITION:  Patient will be hospitalized by Dr. Shoemaker (Surgery) in guarded condition.    FINAL IMPRESSION  1. Thrombosed hemorrhoids    2. External hemorrhoid, bleeding          Anjali GUY (Scribe), am scribing for, and in the presence of, Annie Rich M.D..    Electronically signed by: Anjali Lopez (Scribe), 12/8/2019    IAnnie M.D. personally performed the services described in this documentation, as scribed by Anjali Lopez in my presence, and it is both accurate and complete.    C    The note accurately reflects work and decisions made by me.  Annie Rich  12/8/2019  12:57 PM

## 2019-12-08 NOTE — ANESTHESIA PREPROCEDURE EVALUATION
Relevant Problems   ANESTHESIA   (+) Sleep apnea      ENDO   (+) Hypothyroidism      Other   (+) Attention deficit hyperactivity disorder (ADHD)   (+) Borderline personality disorder (HCC)   (+) Mood disorder (HCC)       Physical Exam    Airway   Mallampati: II  TM distance: >3 FB  Neck ROM: full       Cardiovascular - normal exam  Rhythm: regular  Rate: normal  (-) murmur     Dental - normal exam         Pulmonary - normal exam  Breath sounds clear to auscultation     Abdominal    Neurological - normal exam                 Anesthesia Plan    ASA 2       Plan - general       Airway plan will be LMA      Plan Factors:   Patient was not previously instructed to abstain from smoking on day of procedure.  Patient did not smoke on day of procedure.      Induction: intravenous    Postoperative Plan: Postoperative administration of opioids is intended.    Pertinent diagnostic labs and testing reviewed    Informed Consent:    Anesthetic plan and risks discussed with patient.    Use of blood products discussed with: patient whom consented to blood products.

## 2019-12-08 NOTE — OP REPORT
DATE OF SERVICE:  12/08/2019    PREOPERATIVE DIAGNOSIS:  Thrombosed external hemorrhoid.    POSTOPERATIVE DIAGNOSIS:  Thrombosed external hemorrhoid.    PROCEDURE:  Excision of thrombosed external hemorrhoid.    ANESTHESIA:  LMA General.    ANESTHESIOLOGIST:  Jesse Mills MD    SURGEON:  Manolo Shoemaker MD    INDICATIONS:  Patient with a 24-hour history of thrombosed external   hemorrhoid.  The ER tried to drain it and lanced the clot out and an   outpatient continued to have pain and is bleeding without stopping.    OPERATIVE FINDINGS:  Multiple clots within the venous complex of the external   hemorrhoid.    OPERATIVE NOTE:  Patient was taken to the operating room, placed in supine   position, given general LMA anesthesia.  Once properly anesthetized, was   prepped and draped in the usual fashion.  An elliptical incision was made   around the previous lancing site and carried down through the skin and   subcutaneous tissue into the hemorrhoidal mass, the clots were expressed.  The   vein was oversewn with a figure-of-eight 2-0 Vicryl, the skin was then   loosely closed back over, the ulcer bed with interrupted 2-0 Vicryls.  Wound   was injected with 20 mL of 0.5% Marcaine with epinephrine.  Pressure was held   for 10 minutes.  At the completion of the procedure, there was no active   bleeding.       ____________________________________     Manolo Shoemaker MD    PMS / NTS    DD:  12/08/2019 15:15:04  DT:  12/08/2019 15:26:13    D#:  [id number]  Job#:  [id number]

## 2019-12-08 NOTE — OR SURGEON
Immediate Post OP Note    PreOp Diagnosis: Thrombosed External Hemorrhoid    PostOp Diagnosis: Same    Procedure(s):  Excision of external thrombosed hemorrhoid - Wound Class: Clean Contaminated    Surgeon(s):  Manolo Shoemaker M.D.    Anesthesiologist/Type of Anesthesia:LMA  Anesthesiologist: Jesse Mills M.D./General    Surgical Staff:  Circulator: Indu Agee R.N.  Scrub Person: Sary Pulido    Specimens removed if any:  ID Type Source Tests Collected by Time Destination   A :  Tissue Hemorrhoid PATHOLOGY SPECIMEN Manolo Shoemaker M.D. 12/8/2019  1:57 PM        Estimated Blood Loss: < 5 cc    Findings: multiple small clots in external hemorrhoid    Complications: none        12/8/2019 2:27 PM Manolo Shoemaker M.D.

## 2019-12-09 LAB — PATHOLOGY CONSULT NOTE: NORMAL

## 2020-06-15 ENCOUNTER — OFFICE VISIT (OUTPATIENT)
Dept: URGENT CARE | Facility: CLINIC | Age: 25
End: 2020-06-15
Payer: COMMERCIAL

## 2020-06-15 VITALS
SYSTOLIC BLOOD PRESSURE: 114 MMHG | BODY MASS INDEX: 36.73 KG/M2 | HEART RATE: 96 BPM | HEIGHT: 67 IN | TEMPERATURE: 99 F | OXYGEN SATURATION: 98 % | WEIGHT: 234 LBS | DIASTOLIC BLOOD PRESSURE: 76 MMHG | RESPIRATION RATE: 14 BRPM

## 2020-06-15 DIAGNOSIS — J45.20 MILD INTERMITTENT ASTHMA WITHOUT COMPLICATION: ICD-10-CM

## 2020-06-15 DIAGNOSIS — F60.3 BORDERLINE PERSONALITY DISORDER (HCC): Primary | ICD-10-CM

## 2020-06-15 PROCEDURE — 99213 OFFICE O/P EST LOW 20 MIN: CPT | Performed by: PHYSICIAN ASSISTANT

## 2020-06-15 RX ORDER — VENLAFAXINE HYDROCHLORIDE 150 MG/1
150 CAPSULE, EXTENDED RELEASE ORAL DAILY
Qty: 30 CAP | Refills: 1 | Status: SHIPPED | OUTPATIENT
Start: 2020-06-15 | End: 2020-12-03

## 2020-06-15 RX ORDER — ALBUTEROL SULFATE 90 UG/1
2 AEROSOL, METERED RESPIRATORY (INHALATION) EVERY 6 HOURS PRN
Qty: 8.5 G | Refills: 3 | Status: SHIPPED | OUTPATIENT
Start: 2020-06-15 | End: 2020-06-25

## 2020-06-15 NOTE — PROGRESS NOTES
Subjective:      Pt is a 25 y.o. female who presents with Medication Refill (Pt. needs a refill on her Albuterol inhaler and Effexor 150 mg, qd.  )            HPI  This is a new problem. Pt notes recently lost job and insurance and is almost out of her albuterol for her asthma and Effexor for borderline personality disorder. Pt states reapplying for medicaid but does not want to run out of meds. Pt has not taken any Rx medications for this condition. Pt states the pain is a 0/10. Pt denies CP, SOB, NVD, paresthesias, headaches, dizziness, change in vision, hives, or other joint pain. The pt's medication list, problem list, and allergies have been evaluated and reviewed during today's visit.    PMH:  Past Medical History:   Diagnosis Date   • Anxiety    • Asthma    • Asthma    • Attention deficit hyperactivity disorder (ADHD) 6/27/2019   • Borderline personality disorder (HCC) 2014   • Depression    • Hypothyroidism 6/27/2019   • Sleep apnea    • Sleep apnea     uses c pap   • Snoring        PSH:  Past Surgical History:   Procedure Laterality Date   • HEMORRHOIDECTOMY  12/8/2019    Procedure: HEMORRHOIDECTOMY;  Surgeon: Manolo Shoemaker M.D.;  Location: SURGERY [facility];  Service: General   • PB REMOVE INTRAUTERINE DEVICE  7/29/2019    Procedure: REMOVAL, INTRAUTERINE DEVICE, HYSTEROSCOPY;  Surgeon: Jamil Rick M.D.;  Location: SURGERY SAME DAY [facility];  Service: Obstetrics   • DILATION AND CURETTAGE N/A 7/29/2019    Procedure: DILATION AND CURETTAGE;  Surgeon: Jamil Rick M.D.;  Location: SURGERY SAME DAY [facility];  Service: Obstetrics   • TONSILLECTOMY AND ADENOIDECTOMY  2013   • OTHER      Turbinate reduction       Fam Hx:    family history includes Diabetes in her maternal grandmother and mother; Hyperlipidemia in her maternal grandmother; No Known Problems in her father and sister; Other in her mother; Stroke in her mother.  Family Status   Relation Name Status   • Mo  Alive   • Fa   Alive   • MGMo  Alive   • MGFa  Alive   • PGMo  Alive   • PGFa  Alive   • Sis 1/2 Alive       Soc HX:  Social History     Socioeconomic History   • Marital status: Single     Spouse name: Not on file   • Number of children: Not on file   • Years of education: Not on file   • Highest education level: Not on file   Occupational History   • Not on file   Social Needs   • Financial resource strain: Not on file   • Food insecurity     Worry: Not on file     Inability: Not on file   • Transportation needs     Medical: Not on file     Non-medical: Not on file   Tobacco Use   • Smoking status: Current Some Day Smoker     Packs/day: 0.00   • Smokeless tobacco: Never Used   • Tobacco comment: occass   Substance and Sexual Activity   • Alcohol use: Yes     Comment: occass   • Drug use: Yes     Types: Marijuana, Inhaled     Comment: e cigarettes, Vaping, and MarijuAnna   • Sexual activity: Yes     Partners: Male     Birth control/protection: I.U.D.   Lifestyle   • Physical activity     Days per week: Not on file     Minutes per session: Not on file   • Stress: Not on file   Relationships   • Social connections     Talks on phone: Not on file     Gets together: Not on file     Attends Worship service: Not on file     Active member of club or organization: Not on file     Attends meetings of clubs or organizations: Not on file     Relationship status: Not on file   • Intimate partner violence     Fear of current or ex partner: Not on file     Emotionally abused: Not on file     Physically abused: Not on file     Forced sexual activity: Not on file   Other Topics Concern   • Not on file   Social History Narrative   • Not on file         Medications:    Current Outpatient Medications:   •  venlafaxine (EFFEXOR-XR) 150 MG extended-release capsule, Take 1 Cap by mouth every day., Disp: 30 Cap, Rfl: 1  •  albuterol 108 (90 Base) MCG/ACT Aero Soln inhalation aerosol, Inhale 2 Puffs by mouth every 6 hours as needed for Shortness of  "Breath for up to 10 days., Disp: 8.5 g, Rfl: 3  •  hydrocortisone rectal (ANUSOL-HC) 2.5 % Cream, Apply to affected area tid, Disp: 30 g, Rfl: 0  •  ethinyl estradiol-etonogestrel (NUVARING) 0.12-0.015 MG/24HR vaginal ring, Use as directed, Disp: 3 Each, Rfl: 3  •  busPIRone (BUSPAR) 10 MG Tab tablet, Take 10 mg by mouth 3 times a day., Disp: , Rfl: 1      Allergies:  Patient has no known allergies.    ROS  Constitutional: Negative for fever, chills and malaise/fatigue.   HENT: Negative for congestion and sore throat.    Eyes: Negative for blurred vision, double vision and photophobia.   Respiratory: +for cough and shortness of breath.  Cardiovascular: Negative for chest pain and palpitations.   Gastrointestinal: Negative for heartburn, nausea, vomiting, abdominal pain, diarrhea and constipation.   Genitourinary: Negative for dysuria and flank pain.   Musculoskeletal: Negative for joint pain and myalgias.   Skin: Negative for itching and rash.   Neurological: Negative for dizziness, tingling and headaches.   Endo/Heme/Allergies: Does not bruise/bleed easily.   Psychiatric/Behavioral: Negative for depression. The patient is not nervous/anxious.           Objective:     /76   Pulse 96   Temp 37.2 °C (99 °F) (Temporal)   Resp 14   Ht 1.702 m (5' 7\")   Wt 106.1 kg (234 lb)   SpO2 98%   BMI 36.65 kg/m²      Physical Exam      Physical Exam   Constitutional: PT is oriented to person, place, and time. PT appears well-developed and well-nourished. No distress.   HENT:   Head: Normocephalic and atraumatic.   Right Ear: Hearing, tympanic membrane, external ear and ear canal normal.   Left Ear: Hearing, tympanic membrane, external ear and ear canal normal.   Nose: Mucosal edema, rhinorrhea and sinus tenderness present. Right sinus exhibits frontal sinus tenderness. Left sinus exhibits frontal sinus tenderness.   Mouth/Throat: Uvula is midline. Mucous membranes are pale. Posterior oropharyngeal edema and posterior " oropharyngeal erythema present. No oropharyngeal exudate.   Eyes: Conjunctivae normal and EOM are normal. Pupils are equal, round, and reactive to light. Right eye exhibits no discharge. Left eye exhibits no discharge.   Neck: Normal range of motion. Neck supple. No thyromegaly present.   Cardiovascular: Normal rate, regular rhythm, normal heart sounds and intact distal pulses.  Exam reveals no gallop and no friction rub.    No murmur heard.  Pulmonary/Chest: Effort normal. No respiratory distress. PT has wheezes. PT has no rales. PT exhibits tenderness.   Abdominal: Soft. Bowel sounds are normal. PT exhibits no distension and no mass. There is no tenderness. There is no rebound and no guarding.   Musculoskeletal: Normal range of motion. PT exhibits no edema and no tenderness.   Lymphadenopathy:     PT has no cervical adenopathy.   Neurological: Pt is alert and oriented to person, place, and time. Pt has normal reflexes. No cranial nerve deficit.   Skin: Skin is warm and dry. No rash noted. No erythema.   Psychiatric: PT has a normal mood and affect. Pt behavior is normal. Judgment and thought content normal.          Assessment/Plan:       1. Borderline personality disorder (HCC)    - venlafaxine (EFFEXOR-XR) 150 MG extended-release capsule; Take 1 Cap by mouth every day.  Dispense: 30 Cap; Refill: 1    2. Mild intermittent asthma without complication    - albuterol 108 (90 Base) MCG/ACT Aero Soln inhalation aerosol; Inhale 2 Puffs by mouth every 6 hours as needed for Shortness of Breath for up to 10 days.  Dispense: 8.5 g; Refill: 3      Pt to follow with NEW PCP  Rest, fluids encouraged.  AVS with medical info given.  Pt was in full understanding and agreement with the plan.  Differential diagnosis, natural history, supportive care, and indications for immediate follow-up discussed. All questions answered. Patient agrees with the plan of care.  Follow-up as needed if symptoms worsen or fail to improve to PCP,  Urgent care or Emergency Room.

## 2020-06-15 NOTE — PATIENT INSTRUCTIONS
Asthma, Acute Bronchospasm  Acute bronchospasm caused by asthma is also referred to as an asthma attack. Bronchospasm means your air passages become narrowed. The narrowing is caused by inflammation and tightening of the muscles in the air tubes (bronchi) in your lungs. This can make it hard to breathe or cause you to wheeze and cough.  What are the causes?  Possible triggers are:  · Animal dander from the skin, hair, or feathers of animals.  · Dust mites contained in house dust.  · Cockroaches.  · Pollen from trees or grass.  · Mold.  · Cigarette or tobacco smoke.  · Air pollutants such as dust, household , hair sprays, aerosol sprays, paint fumes, strong chemicals, or strong odors.  · Cold air or weather changes. Cold air may trigger inflammation. Winds increase molds and pollens in the air.  · Strong emotions such as crying or laughing hard.  · Stress.  · Certain medicines such as aspirin or beta-blockers.  · Sulfites in foods and drinks, such as dried fruits and wine.  · Infections or inflammatory conditions, such as a flu, cold, or inflammation of the nasal membranes (rhinitis).  · Gastroesophageal reflux disease (GERD). GERD is a condition where stomach acid backs up into your esophagus.  · Exercise or strenuous activity.  What are the signs or symptoms?  · Wheezing.  · Excessive coughing, particularly at night.  · Chest tightness.  · Shortness of breath.  How is this diagnosed?  Your health care provider will ask you about your medical history and perform a physical exam. A chest X-ray or blood testing may be performed to look for other causes of your symptoms or other conditions that may have triggered your asthma attack.  How is this treated?  Treatment is aimed at reducing inflammation and opening up the airways in your lungs. Most asthma attacks are treated with inhaled medicines. These include quick relief or rescue medicines (such as bronchodilators) and controller medicines (such as inhaled  corticosteroids). These medicines are sometimes given through an inhaler or a nebulizer. Systemic steroid medicine taken by mouth or given through an IV tube also can be used to reduce the inflammation when an attack is moderate or severe. Antibiotic medicines are only used if a bacterial infection is present.  Follow these instructions at home:  · Rest.  · Drink plenty of liquids. This helps the mucus to remain thin and be easily coughed up. Only use caffeine in moderation and do not use alcohol until you have recovered from your illness.  · Do not smoke. Avoid being exposed to secondhand smoke.  · You play a critical role in keeping yourself in good health. Avoid exposure to things that cause you to wheeze or to have breathing problems.  · Keep your medicines up-to-date and available. Carefully follow your health care provider’s treatment plan.  · Take your medicine exactly as prescribed.  · When pollen or pollution is bad, keep windows closed and use an air conditioner or go to places with air conditioning.  · Asthma requires careful medical care. See your health care provider for a follow-up as advised. If you are more than 24 weeks pregnant and you were prescribed any new medicines, let your obstetrician know about the visit and how you are doing. Follow up with your health care provider as directed.  · After you have recovered from your asthma attack, make an appointment with your outpatient doctor to talk about ways to reduce the likelihood of future attacks. If you do not have a doctor who manages your asthma, make an appointment with a primary care doctor to discuss your asthma.  Get help right away if:  · You are getting worse.  · You have trouble breathing. If severe, call your local emergency services (911 in the [place]  · You develop chest pain or discomfort.  · You are vomiting.  · You are not able to keep fluids down.  · You are coughing up yellow, green, brown, or bloody sputum.  · You have a fever  and your symptoms suddenly get worse.  · You have trouble swallowing.  This information is not intended to replace advice given to you by your health care provider. Make sure you discuss any questions you have with your health care provider.  Document Released: 04/03/2008 Document Revised: 05/31/2017 Document Reviewed: 06/25/2014  [employer] Interactive Patient Education © 2017 [employer]

## 2020-06-24 ENCOUNTER — OFFICE VISIT (OUTPATIENT)
Dept: URGENT CARE | Facility: CLINIC | Age: 25
End: 2020-06-24
Payer: COMMERCIAL

## 2020-06-24 VITALS
SYSTOLIC BLOOD PRESSURE: 116 MMHG | OXYGEN SATURATION: 95 % | HEART RATE: 72 BPM | WEIGHT: 234 LBS | HEIGHT: 66 IN | BODY MASS INDEX: 37.61 KG/M2 | DIASTOLIC BLOOD PRESSURE: 86 MMHG | TEMPERATURE: 97 F | RESPIRATION RATE: 16 BRPM

## 2020-06-24 DIAGNOSIS — H00.012 HORDEOLUM EXTERNUM OF RIGHT LOWER EYELID: ICD-10-CM

## 2020-06-24 PROCEDURE — 99214 OFFICE O/P EST MOD 30 MIN: CPT | Performed by: NURSE PRACTITIONER

## 2020-06-24 RX ORDER — ERYTHROMYCIN 5 MG/G
1 OINTMENT OPHTHALMIC 2 TIMES DAILY
Qty: 3.5 G | Refills: 0 | Status: SHIPPED | OUTPATIENT
Start: 2020-06-24 | End: 2020-07-04

## 2020-06-24 ASSESSMENT — ENCOUNTER SYMPTOMS
EYE DISCHARGE: 0
BLURRED VISION: 0
CHILLS: 0
EYE PAIN: 0
PHOTOPHOBIA: 0
HEADACHES: 0
EYE REDNESS: 0
DIZZINESS: 0
FEVER: 0
DOUBLE VISION: 0

## 2020-06-24 NOTE — PROGRESS NOTES
"Subjective:      Vanessa Sanchez is a 25 y.o. female who presents with Eye Problem (right, last week had a stye, huge bump inside lower eyelid, did hot compression, and over the counter medication x 1 week )    Reviewed past medical, surgical and family history. Reviewed prescription and OTC medications with patient in electronic health record today  Allergies: Patient has no known allergies.                HPI this is a new problem.  Vanessa is a 25-year-old female presents with right eye pain and swelling.  She thought she had a stye last week.  Although it was larger than normal.  She tried warm compresses 1-2 times every day.  She tried some over-the-counter stye medication (ointment and then drops) which have not alleviated her pain, swelling and discomfort.  She has no vision change.  No fever, chills, nasal drainage.  She feels healthy and well.  Yesterday the swelling started extending lower onto her cheek.  She decided come to urgent care to be evaluated.  No other aggravating or alleviating factors.  She has had styes in the past.    Review of Systems   Constitutional: Negative for chills and fever.   HENT: Negative for congestion.    Eyes: Negative for blurred vision, double vision, photophobia, pain, discharge and redness.   Neurological: Negative for dizziness and headaches.   Endo/Heme/Allergies: Negative for environmental allergies.          Objective:     /86   Pulse 72   Temp 36.1 °C (97 °F) (Temporal)   Resp 16   Ht 1.676 m (5' 6\")   Wt 106.1 kg (234 lb)   SpO2 95%   BMI 37.77 kg/m²      Physical Exam  Nursing note reviewed.   Constitutional:       Appearance: She is well-developed.   HENT:      Head: Normocephalic.   Eyes:      General: Lids are normal.         Right eye: Hordeolum present. No discharge.      Extraocular Movements: Extraocular movements intact.      Conjunctiva/sclera: Conjunctivae normal.      Pupils: Pupils are equal, round, and reactive to light.     Neck:      " Musculoskeletal: Full passive range of motion without pain and normal range of motion.   Cardiovascular:      Rate and Rhythm: Normal rate.   Pulmonary:      Effort: Pulmonary effort is normal.   Skin:     General: Skin is warm and dry.      Capillary Refill: Capillary refill takes less than 2 seconds.   Neurological:      Mental Status: She is alert and oriented to person, place, and time.   Psychiatric:         Speech: Speech normal.         Behavior: Behavior normal.                 Assessment/Plan:       1. Hordeolum externum of right lower eyelid    - erythromycin 5 MG/GM Ointment; Place 1 Application in both eyes 2 times a day for 10 days.  Dispense: 3.5 g; Refill: 0      Continue warm compresses twice daily  Gentle circular massage to the stye area twice daily  Use dilute baby shampoo solution to gently clean the right eyelid margin daily. Continue warm compresses 3 or 4 times a day    Educated in proper administration of medication(s) ordered today including safety, possible SE, risks, benefits, rationale and alternatives to therapy.     FU prn new or worsening sx.

## 2020-10-12 ENCOUNTER — OFFICE VISIT (OUTPATIENT)
Dept: URGENT CARE | Facility: CLINIC | Age: 25
End: 2020-10-12
Payer: COMMERCIAL

## 2020-10-12 VITALS
TEMPERATURE: 98.6 F | BODY MASS INDEX: 37.61 KG/M2 | SYSTOLIC BLOOD PRESSURE: 128 MMHG | WEIGHT: 234 LBS | DIASTOLIC BLOOD PRESSURE: 76 MMHG | RESPIRATION RATE: 12 BRPM | HEART RATE: 74 BPM | OXYGEN SATURATION: 95 % | HEIGHT: 66 IN

## 2020-10-12 DIAGNOSIS — Z87.19 HISTORY OF HEMORRHOIDS: ICD-10-CM

## 2020-10-12 DIAGNOSIS — Z23 NEEDS FLU SHOT: ICD-10-CM

## 2020-10-12 PROCEDURE — 90686 IIV4 VACC NO PRSV 0.5 ML IM: CPT | Performed by: NURSE PRACTITIONER

## 2020-10-12 PROCEDURE — 90471 IMMUNIZATION ADMIN: CPT | Performed by: NURSE PRACTITIONER

## 2020-10-12 PROCEDURE — 99214 OFFICE O/P EST MOD 30 MIN: CPT | Mod: 25 | Performed by: NURSE PRACTITIONER

## 2020-10-12 ASSESSMENT — ENCOUNTER SYMPTOMS
CONSTITUTIONAL NEGATIVE: 1
DIARRHEA: 1
RESPIRATORY NEGATIVE: 1
COUGH: 0
FEVER: 0
ROS GI COMMENTS: HEMORRHOIDS
CHILLS: 0
ABDOMINAL PAIN: 1
SHORTNESS OF BREATH: 0
CONSTIPATION: 1

## 2020-10-12 ASSESSMENT — VISUAL ACUITY: OU: 1

## 2020-10-12 NOTE — PROGRESS NOTES
Subjective:     Vanessa Sanchez is a 25 y.o. female who presents for Hemorrhoids (have not gone away for a couple months , wants a referral to a GI) and Flu Vaccine (pt would also like to get flu shot)       Other  This is a new problem. Associated symptoms include abdominal pain (Mild). Pertinent negatives include no chills, coughing or fever.     Patient requesting referral to GI. She has an appointment with a specialist in 3 days. Reports a history of recurrent hemorrhoids. History of hemorrhoidectomy 12/2019. She has tried multiple treatments including sitz baths, ointments, wipes, and increasing fiber with no improvement in the symptoms. Reports diarrhea alternating with constipation. Has been having mild abdominal pain, but thinks it could be due to her psych medication. She is concerned she may have IBS.    Patient also needs her flu shot. Has had it in the past every year except for last season where she had flu B. Denies fever, cough, or acute symptoms at this time.    Patient was screened prior to rooming and denied COVID-19 diagnosis or contact with a person who has been diagnosed or is suspected to have COVID-19. During this visit, appropriate PPE was worn, hand hygiene was performed, and the patient and any visitors were masked.     PMH:  has a past medical history of Anxiety, Asthma, Asthma, Attention deficit hyperactivity disorder (ADHD) (6/27/2019), Borderline personality disorder (HCC) (2014), Depression, Hypothyroidism (6/27/2019), Sleep apnea, Sleep apnea, and Snoring. She also has no past medical history of Addisons disease ([facility]), Adrenal disorder ([facility]), Allergy, Anemia, Arrhythmia, Arthritis, Blood transfusion without reported diagnosis, Cancer ([facility]), Cataract, CHF (congestive heart failure) ([facility]), Clotting disorder ([facility]), COPD (chronic obstructive pulmonary disease) ([facility]), Cushings syndrome ([facility]), Diabetes ([facility]), Diabetic neuropathy ([facility]), GERD (gastroesophageal reflux disease), Glaucoma,  Goiter, Head ache, Heart attack (HCC), Heart murmur, HIV (human immunodeficiency virus infection) (HCC), Hyperlipidemia, Hypertension, IBD (inflammatory bowel disease), Kidney disease, Meningitis, Migraine, Muscle disorder, Osteoporosis, Parathyroid disorder (HCC), Pituitary disease (HCC), Pulmonary emphysema (HCC), Seizure (HCC), Sickle cell disease (HCC), Stroke (HCC), Substance abuse (HCC), or Tuberculosis.    MEDS:   Current Outpatient Medications:   •  ethinyl estradiol-etonogestrel (NUVARING) 0.12-0.015 MG/24HR vaginal ring, Use as directed, Disp: 3 Each, Rfl: 3  •  busPIRone (BUSPAR) 10 MG Tab tablet, Take 10 mg by mouth 3 times a day., Disp: , Rfl: 1  •  venlafaxine (EFFEXOR-XR) 150 MG extended-release capsule, Take 1 Cap by mouth every day., Disp: 30 Cap, Rfl: 1  •  hydrocortisone rectal (ANUSOL-HC) 2.5 % Cream, Apply to affected area tid, Disp: 30 g, Rfl: 0    ALLERGIES: No Known Allergies    SURGHX:   Past Surgical History:   Procedure Laterality Date   • HEMORRHOIDECTOMY  12/8/2019    Procedure: HEMORRHOIDECTOMY;  Surgeon: Manolo Shoemaker M.D.;  Location: SURGERY [facility];  Service: General   • PB REMOVE INTRAUTERINE DEVICE  7/29/2019    Procedure: REMOVAL, INTRAUTERINE DEVICE, HYSTEROSCOPY;  Surgeon: Jamil Rick M.D.;  Location: SURGERY SAME DAY [facility];  Service: Obstetrics   • DILATION AND CURETTAGE N/A 7/29/2019    Procedure: DILATION AND CURETTAGE;  Surgeon: Jamil Rick M.D.;  Location: SURGERY SAME DAY [facility];  Service: Obstetrics   • TONSILLECTOMY AND ADENOIDECTOMY  2013   • OTHER      Turbinate reduction     SOCHX:  reports that she has been smoking. She has been smoking about 0.00 packs per day. She has never used smokeless tobacco. She reports current alcohol use. She reports current drug use. Drugs: Marijuana and Inhaled.     FH: Reviewed with patient, not pertinent to this visit.    Review of Systems   Constitutional: Negative.  Negative for chills, fever and  "malaise/fatigue.   HENT: Negative.    Respiratory: Negative.  Negative for cough and shortness of breath.    Gastrointestinal: Positive for abdominal pain (Mild), constipation and diarrhea.        Hemorrhoids   All other systems reviewed and are negative.    Additional details per HPI.      Objective:     /76   Pulse 74   Temp 37 °C (98.6 °F) (Temporal)   Resp 12   Ht 1.676 m (5' 6\")   Wt 106.1 kg (234 lb)   SpO2 95%   BMI 37.77 kg/m²     Physical Exam  Vitals signs reviewed.   Constitutional:       General: She is not in acute distress.     Appearance: She is well-developed. She is not ill-appearing or toxic-appearing.   HENT:      Head: Normocephalic.      Right Ear: External ear normal.      Left Ear: External ear normal.      Nose: Nose normal.   Eyes:      General: Vision grossly intact.      Extraocular Movements: Extraocular movements intact.      Conjunctiva/sclera: Conjunctivae normal.   Neck:      Musculoskeletal: Normal range of motion.   Cardiovascular:      Rate and Rhythm: Normal rate.   Pulmonary:      Effort: Pulmonary effort is normal. No respiratory distress.   Musculoskeletal: Normal range of motion.         General: No deformity.   Skin:     General: Skin is warm and dry.      Coloration: Skin is not pale.   Neurological:      Mental Status: She is alert and oriented to person, place, and time.      Sensory: No sensory deficit.      Motor: No weakness.      Coordination: Coordination normal.   Psychiatric:         Behavior: Behavior normal. Behavior is cooperative.       Assessment/Plan:     1. History of hemorrhoids  - REFERRAL TO GASTROENTEROLOGY    2. Needs flu shot  - Influenza Vaccine Quad Injection (PF)    Patient has a GI appointment in 3 days and needs a referral order. Referral order given. She declines Anusol at this time.    Differential diagnosis, natural history, supportive care, over-the-counter symptom management per 's instructions, close monitoring, and " indications for immediate follow-up discussed.     Patient advised to: Return for 1) Symptoms that worsen/don't improve, or go to ER, 2) Follow up with primary care in 7-10 days.    All questions answered. Patient agrees with the plan of care.    Billing note for MDM: at least a moderate risk due to 1) undiagnosed new problem, and problem points due to 2) new, further work up planned (referral to GI for further evaluation and treatment).

## 2020-11-09 DIAGNOSIS — N92.1 BREAKTHROUGH BLEEDING ON NEXPLANON: ICD-10-CM

## 2020-11-09 DIAGNOSIS — Z97.5 BREAKTHROUGH BLEEDING ON NEXPLANON: ICD-10-CM

## 2020-11-17 RX ORDER — ETONOGESTREL AND ETHINYL ESTRADIOL VAGINAL RING .015; .12 MG/D; MG/D
RING VAGINAL
Qty: 4 EACH | Refills: 3 | Status: SHIPPED | OUTPATIENT
Start: 2020-11-17 | End: 2020-12-03 | Stop reason: SDUPTHER

## 2020-12-03 ENCOUNTER — GYNECOLOGY VISIT (OUTPATIENT)
Dept: OBGYN | Facility: CLINIC | Age: 25
End: 2020-12-03
Payer: COMMERCIAL

## 2020-12-03 VITALS
SYSTOLIC BLOOD PRESSURE: 116 MMHG | WEIGHT: 247.6 LBS | BODY MASS INDEX: 38.86 KG/M2 | DIASTOLIC BLOOD PRESSURE: 80 MMHG | HEIGHT: 67 IN

## 2020-12-03 DIAGNOSIS — N92.1 BREAKTHROUGH BLEEDING ON NEXPLANON: ICD-10-CM

## 2020-12-03 DIAGNOSIS — Z97.5 BREAKTHROUGH BLEEDING ON NEXPLANON: ICD-10-CM

## 2020-12-03 PROCEDURE — 99999 PR NO CHARGE: CPT | Performed by: NURSE PRACTITIONER

## 2020-12-03 RX ORDER — LAMOTRIGINE 25 MG/1
100 TABLET ORAL DAILY
COMMUNITY
Start: 2020-11-12 | End: 2022-02-04

## 2020-12-03 RX ORDER — ETONOGESTREL AND ETHINYL ESTRADIOL VAGINAL RING .015; .12 MG/D; MG/D
RING VAGINAL
Qty: 4 EACH | Refills: 3 | Status: SHIPPED | OUTPATIENT
Start: 2020-12-03 | End: 2022-03-30 | Stop reason: SDUPTHER

## 2020-12-03 RX ORDER — ALBUTEROL SULFATE 90 UG/1
1 AEROSOL, METERED RESPIRATORY (INHALATION) PRN
COMMUNITY
Start: 2020-12-01 | End: 2022-04-20 | Stop reason: SDUPTHER

## 2020-12-03 RX ORDER — CLONAZEPAM 0.5 MG/1
1 TABLET ORAL 2 TIMES DAILY
COMMUNITY
Start: 2020-11-05 | End: 2021-09-23 | Stop reason: CLARIF

## 2020-12-03 RX ORDER — CARIPRAZINE 1.5 MG/1
1 CAPSULE, GELATIN COATED ORAL EVERY MORNING
COMMUNITY
Start: 2020-11-12 | End: 2022-03-30

## 2020-12-03 NOTE — NON-PROVIDER
Patient here for a GYN follow up.   Last seen on 12/06/2019  C/o  BC that works with new medication that was prescribed.  pharmacy verified.  Patient phone #: [phone]

## 2021-02-17 ENCOUNTER — OFFICE VISIT (OUTPATIENT)
Dept: URGENT CARE | Facility: CLINIC | Age: 26
End: 2021-02-17
Payer: COMMERCIAL

## 2021-02-17 VITALS
DIASTOLIC BLOOD PRESSURE: 72 MMHG | TEMPERATURE: 97.5 F | OXYGEN SATURATION: 96 % | HEART RATE: 85 BPM | HEIGHT: 66 IN | SYSTOLIC BLOOD PRESSURE: 126 MMHG | BODY MASS INDEX: 39.37 KG/M2 | WEIGHT: 245 LBS | RESPIRATION RATE: 18 BRPM

## 2021-02-17 DIAGNOSIS — T50.905A ADVERSE EFFECT OF DRUG, INITIAL ENCOUNTER: ICD-10-CM

## 2021-02-17 DIAGNOSIS — R10.13 EPIGASTRIC PAIN: ICD-10-CM

## 2021-02-17 DIAGNOSIS — R11.2 NAUSEA AND VOMITING, INTRACTABILITY OF VOMITING NOT SPECIFIED, UNSPECIFIED VOMITING TYPE: ICD-10-CM

## 2021-02-17 PROCEDURE — 99999 PR NO CHARGE: CPT | Performed by: PHYSICIAN ASSISTANT

## 2021-02-17 PROCEDURE — 99214 OFFICE O/P EST MOD 30 MIN: CPT | Mod: 25 | Performed by: PHYSICIAN ASSISTANT

## 2021-02-17 RX ORDER — ONDANSETRON 4 MG/1
4 TABLET, ORALLY DISINTEGRATING ORAL EVERY 8 HOURS PRN
Qty: 12 TABLET | Refills: 0 | Status: SHIPPED | OUTPATIENT
Start: 2021-02-17 | End: 2021-02-24

## 2021-02-17 RX ORDER — ONDANSETRON 2 MG/ML
4 INJECTION INTRAMUSCULAR; INTRAVENOUS ONCE
Status: COMPLETED | OUTPATIENT
Start: 2021-02-17 | End: 2021-02-17

## 2021-02-17 RX ADMIN — ONDANSETRON 4 MG: 2 INJECTION INTRAMUSCULAR; INTRAVENOUS at 22:31

## 2021-02-18 ASSESSMENT — ENCOUNTER SYMPTOMS
NAUSEA: 1
FOCAL WEAKNESS: 0
ABDOMINAL PAIN: 1
DEPRESSION: 0
WHEEZING: 0
FLANK PAIN: 0
CHILLS: 0
COUGH: 0
SHORTNESS OF BREATH: 0
PALPITATIONS: 0
VOMITING: 1
SENSORY CHANGE: 0
NERVOUS/ANXIOUS: 1
DIARRHEA: 0
HEADACHES: 0
TINGLING: 0
FEVER: 0

## 2021-02-18 ASSESSMENT — LIFESTYLE VARIABLES: SUBSTANCE_ABUSE: 0

## 2021-02-18 NOTE — PROGRESS NOTES
Subjective:      Vanessa Sanchez is a 26 y.o. female who presents with Medication Problem (lexapro , serve stomach pain , cant drink water, really nausea, started 3 days when medication was first given)            The patient is here today with complaints of abdominal pain, nausea and vomiting after starting Lexapro 3 days ago. Her last dose was today. Her abdominal pain is in the epigastric area and is intermittent. She describes the pain as cramping. She has had no fever or chills. She has not been able to eat. She has been dry heaving. She has not had confusion. She has more increased anxiety. She tried Pepto-Bismol with mild relief. She denies diarrhea. She tried to get a hold of her psychiatrist but was unable to. She denies alcohol or drug use.       Past Medical History:   Diagnosis Date   • Anxiety    • Asthma    • Attention deficit hyperactivity disorder (ADHD) 6/27/2019   • Borderline personality disorder (HCC) 2014   • Depression    • Hypothyroidism 6/27/2019   • Sleep apnea    • Sleep apnea     uses c pap   • Snoring        Past Surgical History:   Procedure Laterality Date   • HEMORRHOIDECTOMY  12/8/2019    Procedure: HEMORRHOIDECTOMY;  Surgeon: Manolo Shoemaker M.D.;  Location: SURGERY [facility];  Service: General   • PB REMOVE INTRAUTERINE DEVICE  7/29/2019    Procedure: REMOVAL, INTRAUTERINE DEVICE, HYSTEROSCOPY;  Surgeon: Jamil Rick M.D.;  Location: SURGERY SAME DAY [facility];  Service: Obstetrics   • DILATION AND CURETTAGE N/A 7/29/2019    Procedure: DILATION AND CURETTAGE;  Surgeon: Jamil Rick M.D.;  Location: SURGERY SAME DAY [facility];  Service: Obstetrics   • TONSILLECTOMY AND ADENOIDECTOMY  2013   • OTHER      Turbinate reduction       Family History   Problem Relation Age of Onset   • Diabetes Mother    • Stroke Mother    • Other Mother         fibromyalgia   • No Known Problems Father    • Diabetes Maternal Grandmother    • Hyperlipidemia Maternal Grandmother   "  • No Known Problems Sister        No Known Allergies    Medications, Allergies, and current problem list reviewed today in Epic    Review of Systems   Constitutional: Negative for chills, fever and malaise/fatigue.   Respiratory: Negative for cough, shortness of breath and wheezing.    Cardiovascular: Negative for chest pain, palpitations and leg swelling.   Gastrointestinal: Positive for abdominal pain, nausea and vomiting. Negative for diarrhea.   Genitourinary: Negative for dysuria, flank pain, frequency, hematuria and urgency.   Skin: Negative for rash.   Neurological: Negative for tingling, sensory change, focal weakness and headaches.   Psychiatric/Behavioral: Negative for depression, substance abuse and suicidal ideas. The patient is nervous/anxious.      All other systems reviewed and are negative.        Objective:     /72   Pulse 85   Temp 36.4 °C (97.5 °F) (Temporal)   Resp 18   Ht 1.676 m (5' 6\")   Wt 111 kg (245 lb)   SpO2 96%   BMI 39.54 kg/m²      Physical Exam  Constitutional:       General: She is in acute distress (appears uncomfortable).      Appearance: She is not ill-appearing.   HENT:      Head: Normocephalic and atraumatic.   Eyes:      General: No scleral icterus.     Conjunctiva/sclera: Conjunctivae normal.   Cardiovascular:      Rate and Rhythm: Normal rate and regular rhythm.      Heart sounds: Normal heart sounds. No murmur.   Pulmonary:      Effort: Pulmonary effort is normal. No respiratory distress.      Breath sounds: Normal breath sounds. No wheezing, rhonchi or rales.   Abdominal:      General: There is no distension.      Palpations: Abdomen is soft. There is no mass.      Tenderness: There is abdominal tenderness in the epigastric area. There is no right CVA tenderness, left CVA tenderness, guarding or rebound. Negative signs include العلي's sign and McBurney's sign.   Musculoskeletal:         General: Normal range of motion.   Skin:     General: Skin is warm and " dry.   Neurological:      General: No focal deficit present.      Mental Status: She is alert and oriented to person, place, and time.   Psychiatric:         Mood and Affect: Mood is anxious (moderate).         Behavior: Behavior normal.         Thought Content: Thought content normal.         Judgment: Judgment normal.                 Assessment/Plan:        1. Nausea and vomiting, intractability of vomiting not specified, unspecified vomiting type  ondansetron (ZOFRAN) syringe/vial injection 4 mg    ondansetron (ZOFRAN ODT) 4 MG TABLET DISPERSIBLE   2. Epigastric pain  ondansetron (ZOFRAN) syringe/vial injection 4 mg    ondansetron (ZOFRAN ODT) 4 MG TABLET DISPERSIBLE   3. Adverse effect of drug, initial encounter           Stop Lexapro. Call Psychiatrist again tomorrow to discussed.  Zofran IM 4 mg given in office.  RX for Zofran prescribed.   I do feel this is an adverse reaction to her Lexapro.    Differential diagnoses, Supportive care, and indications for immediate follow-up discussed with patient.   Pathogenesis of diagnosis discussed including typical length and natural progression.   Instructed to return to clinic or nearest emergency department for any change in condition, further concerns, or worsening of symptoms.    The patient demonstrated a good understanding and agreed with the treatment plan.    Opal Cohen P.A.-C.

## 2021-02-23 ENCOUNTER — APPOINTMENT (OUTPATIENT)
Dept: RADIOLOGY | Facility: MEDICAL CENTER | Age: 26
End: 2021-02-23
Attending: EMERGENCY MEDICINE
Payer: COMMERCIAL

## 2021-02-23 ENCOUNTER — HOSPITAL ENCOUNTER (EMERGENCY)
Facility: MEDICAL CENTER | Age: 26
End: 2021-02-24
Attending: EMERGENCY MEDICINE
Payer: COMMERCIAL

## 2021-02-23 VITALS
SYSTOLIC BLOOD PRESSURE: 130 MMHG | OXYGEN SATURATION: 97 % | RESPIRATION RATE: 16 BRPM | WEIGHT: 245.59 LBS | DIASTOLIC BLOOD PRESSURE: 75 MMHG | TEMPERATURE: 97 F | HEIGHT: 66 IN | BODY MASS INDEX: 39.47 KG/M2 | HEART RATE: 71 BPM

## 2021-02-23 DIAGNOSIS — E86.0 DEHYDRATION: ICD-10-CM

## 2021-02-23 DIAGNOSIS — R11.2 NON-INTRACTABLE VOMITING WITH NAUSEA, UNSPECIFIED VOMITING TYPE: ICD-10-CM

## 2021-02-23 DIAGNOSIS — R10.13 EPIGASTRIC PAIN: ICD-10-CM

## 2021-02-23 LAB
ALBUMIN SERPL BCP-MCNC: 3.9 G/DL (ref 3.2–4.9)
ALBUMIN/GLOB SERPL: 1 G/DL
ALP SERPL-CCNC: 80 U/L (ref 30–99)
ALT SERPL-CCNC: 9 U/L (ref 2–50)
ANION GAP SERPL CALC-SCNC: 15 MMOL/L (ref 7–16)
APPEARANCE UR: CLEAR
AST SERPL-CCNC: 15 U/L (ref 12–45)
BACTERIA #/AREA URNS HPF: NEGATIVE /HPF
BASOPHILS # BLD AUTO: 0.3 % (ref 0–1.8)
BASOPHILS # BLD: 0.02 K/UL (ref 0–0.12)
BILIRUB SERPL-MCNC: 0.3 MG/DL (ref 0.1–1.5)
BILIRUB UR QL STRIP.AUTO: NEGATIVE
BUN SERPL-MCNC: 9 MG/DL (ref 8–22)
CALCIUM SERPL-MCNC: 9.5 MG/DL (ref 8.5–10.5)
CHLORIDE SERPL-SCNC: 110 MMOL/L (ref 96–112)
CO2 SERPL-SCNC: 14 MMOL/L (ref 20–33)
COLOR UR: YELLOW
CREAT SERPL-MCNC: 0.56 MG/DL (ref 0.5–1.4)
EOSINOPHIL # BLD AUTO: 0.11 K/UL (ref 0–0.51)
EOSINOPHIL NFR BLD: 1.7 % (ref 0–6.9)
EPI CELLS #/AREA URNS HPF: NORMAL /HPF
ERYTHROCYTE [DISTWIDTH] IN BLOOD BY AUTOMATED COUNT: 41.2 FL (ref 35.9–50)
GLOBULIN SER CALC-MCNC: 3.9 G/DL (ref 1.9–3.5)
GLUCOSE SERPL-MCNC: 78 MG/DL (ref 65–99)
GLUCOSE UR STRIP.AUTO-MCNC: NEGATIVE MG/DL
HCG SERPL QL: NEGATIVE
HCT VFR BLD AUTO: 46.1 % (ref 37–47)
HGB BLD-MCNC: 15.5 G/DL (ref 12–16)
IMM GRANULOCYTES # BLD AUTO: 0.02 K/UL (ref 0–0.11)
IMM GRANULOCYTES NFR BLD AUTO: 0.3 % (ref 0–0.9)
KETONES UR STRIP.AUTO-MCNC: ABNORMAL MG/DL
LEUKOCYTE ESTERASE UR QL STRIP.AUTO: ABNORMAL
LIPASE SERPL-CCNC: 32 U/L (ref 11–82)
LYMPHOCYTES # BLD AUTO: 2.37 K/UL (ref 1–4.8)
LYMPHOCYTES NFR BLD: 36.9 % (ref 22–41)
MCH RBC QN AUTO: 30.6 PG (ref 27–33)
MCHC RBC AUTO-ENTMCNC: 33.6 G/DL (ref 33.6–35)
MCV RBC AUTO: 90.9 FL (ref 81.4–97.8)
MICRO URNS: ABNORMAL
MONOCYTES # BLD AUTO: 0.43 K/UL (ref 0–0.85)
MONOCYTES NFR BLD AUTO: 6.7 % (ref 0–13.4)
MUCOUS THREADS #/AREA URNS HPF: NORMAL /HPF
NEUTROPHILS # BLD AUTO: 3.48 K/UL (ref 2–7.15)
NEUTROPHILS NFR BLD: 54.1 % (ref 44–72)
NITRITE UR QL STRIP.AUTO: NEGATIVE
NRBC # BLD AUTO: 0 K/UL
NRBC BLD-RTO: 0 /100 WBC
PH UR STRIP.AUTO: 7 [PH] (ref 5–8)
PLATELET # BLD AUTO: 309 K/UL (ref 164–446)
PMV BLD AUTO: 10.8 FL (ref 9–12.9)
POTASSIUM SERPL-SCNC: 4.2 MMOL/L (ref 3.6–5.5)
PROT SERPL-MCNC: 7.8 G/DL (ref 6–8.2)
PROT UR QL STRIP: NEGATIVE MG/DL
RBC # BLD AUTO: 5.07 M/UL (ref 4.2–5.4)
RBC # URNS HPF: NORMAL /HPF
RBC UR QL AUTO: NEGATIVE
SODIUM SERPL-SCNC: 139 MMOL/L (ref 135–145)
SP GR UR STRIP.AUTO: 1.03
UROBILINOGEN UR STRIP.AUTO-MCNC: 1 MG/DL
WBC # BLD AUTO: 6.4 K/UL (ref 4.8–10.8)
WBC #/AREA URNS HPF: NORMAL /HPF

## 2021-02-23 PROCEDURE — 96375 TX/PRO/DX INJ NEW DRUG ADDON: CPT

## 2021-02-23 PROCEDURE — 81001 URINALYSIS AUTO W/SCOPE: CPT

## 2021-02-23 PROCEDURE — 96374 THER/PROPH/DIAG INJ IV PUSH: CPT

## 2021-02-23 PROCEDURE — 84703 CHORIONIC GONADOTROPIN ASSAY: CPT

## 2021-02-23 PROCEDURE — 700105 HCHG RX REV CODE 258: Performed by: EMERGENCY MEDICINE

## 2021-02-23 PROCEDURE — 85025 COMPLETE CBC W/AUTO DIFF WBC: CPT

## 2021-02-23 PROCEDURE — 96372 THER/PROPH/DIAG INJ SC/IM: CPT | Mod: XU

## 2021-02-23 PROCEDURE — 83690 ASSAY OF LIPASE: CPT

## 2021-02-23 PROCEDURE — 99284 EMERGENCY DEPT VISIT MOD MDM: CPT

## 2021-02-23 PROCEDURE — 80053 COMPREHEN METABOLIC PANEL: CPT

## 2021-02-23 PROCEDURE — 700111 HCHG RX REV CODE 636 W/ 250 OVERRIDE (IP): Performed by: EMERGENCY MEDICINE

## 2021-02-23 RX ORDER — ONDANSETRON 2 MG/ML
4 INJECTION INTRAMUSCULAR; INTRAVENOUS ONCE
Status: COMPLETED | OUTPATIENT
Start: 2021-02-23 | End: 2021-02-23

## 2021-02-23 RX ORDER — KETOROLAC TROMETHAMINE 30 MG/ML
15 INJECTION, SOLUTION INTRAMUSCULAR; INTRAVENOUS ONCE
Status: COMPLETED | OUTPATIENT
Start: 2021-02-23 | End: 2021-02-24

## 2021-02-23 RX ORDER — DICYCLOMINE HYDROCHLORIDE 10 MG/ML
20 INJECTION INTRAMUSCULAR ONCE
Status: COMPLETED | OUTPATIENT
Start: 2021-02-23 | End: 2021-02-23

## 2021-02-23 RX ORDER — SODIUM CHLORIDE 9 MG/ML
1000 INJECTION, SOLUTION INTRAVENOUS ONCE
Status: COMPLETED | OUTPATIENT
Start: 2021-02-23 | End: 2021-02-24

## 2021-02-23 RX ADMIN — ONDANSETRON 4 MG: 2 INJECTION INTRAMUSCULAR; INTRAVENOUS at 23:24

## 2021-02-23 RX ADMIN — DICYCLOMINE HYDROCHLORIDE 20 MG: 20 INJECTION, SOLUTION INTRAMUSCULAR at 23:23

## 2021-02-23 RX ADMIN — SODIUM CHLORIDE 1000 ML: 9 INJECTION, SOLUTION INTRAVENOUS at 23:23

## 2021-02-23 RX ADMIN — FAMOTIDINE 20 MG: 10 INJECTION INTRAVENOUS at 23:24

## 2021-02-24 PROCEDURE — 700111 HCHG RX REV CODE 636 W/ 250 OVERRIDE (IP): Performed by: EMERGENCY MEDICINE

## 2021-02-24 PROCEDURE — 76705 ECHO EXAM OF ABDOMEN: CPT

## 2021-02-24 PROCEDURE — 96375 TX/PRO/DX INJ NEW DRUG ADDON: CPT

## 2021-02-24 RX ORDER — ONDANSETRON 4 MG/1
4 TABLET, ORALLY DISINTEGRATING ORAL EVERY 6 HOURS PRN
Qty: 8 TABLET | Refills: 0 | Status: SHIPPED | OUTPATIENT
Start: 2021-02-24 | End: 2021-07-23

## 2021-02-24 RX ORDER — FAMOTIDINE 20 MG/1
20 TABLET, FILM COATED ORAL 2 TIMES DAILY
Qty: 60 TABLET | Refills: 0 | Status: SHIPPED | OUTPATIENT
Start: 2021-02-24 | End: 2021-03-26

## 2021-02-24 RX ORDER — SUCRALFATE 1 G/1
1 TABLET ORAL
Qty: 56 TABLET | Refills: 0 | Status: SHIPPED | OUTPATIENT
Start: 2021-02-24 | End: 2021-03-10

## 2021-02-24 RX ADMIN — KETOROLAC TROMETHAMINE 15 MG: 30 INJECTION, SOLUTION INTRAMUSCULAR; INTRAVENOUS at 00:35

## 2021-02-24 NOTE — ED NOTES
Patient walked with a steady gate at this time to er [facility] area room 56. Placed patient into gown, gave warm blanket, and call light. Ready to be seen  Patient able to walk to and from restroom with steady gate at this time. Urine sample is at bedside

## 2021-02-24 NOTE — ED NOTES
Pt given discharge instructions, information on prescriptions to pharmacy. Educated on foods. IV removed. Pt ambulatory to lobby with .

## 2021-02-24 NOTE — ED PROVIDER NOTES
ED Provider Note    CHIEF COMPLAINT  Chief Complaint   Patient presents with   • Abdominal Pain     URQ pain, tender to the touch, sharp burning pain.   • Nausea/Vomiting/Diarrhea     x8 days. Patient has been taking Zofran without relief.    • Sent from Urgent Care       HPI  Vanessa Sanchez is a 26 y.o. female who presents to the emergency department chief complaint of about a week of epigastric and right upper quadrant pain.  Patient states it started a few days after starting a new medication.  She was seen in urgent care they told her to stop the meds which she did but her pain is persisted.  She denies any fevers or chills bloody emesis she has had no diarrhea but had normal soft bowel movements.  She states every time she eats it makes the pain worse but then if she does not eat after a while she started to have a little bit of discomfort.  She has tried Tums once without improvement in her symptoms.  She denies back pain chest pain or shortness of breath pain is burning in nature and is currently a 6 out of 10  She states she has been vomiting quite a bit and feels extremely dehydrated    REVIEW OF SYSTEMS  Positives as above. Pertinent negatives include fevers chills cough congestion chest pain shortness of breath bloody stools bloody emesis easy bleeding or bruising constipation diarrhea flank pain dysuria hematuria leg swelling rash  All other review of systems are negative    PAST MEDICAL HISTORY   has a past medical history of Anxiety, Asthma, Attention deficit hyperactivity disorder (ADHD) (6/27/2019), Borderline personality disorder (HCC) (2014), Depression, Hypothyroidism (6/27/2019), Sleep apnea, Sleep apnea, and Snoring.    SOCIAL HISTORY  Social History     Tobacco Use   • Smoking status: Current Some Day Smoker     Packs/day: 0.00   • Smokeless tobacco: Never Used   • Tobacco comment: occass   Substance and Sexual Activity   • Alcohol use: Yes     Comment: occass   • Drug use: Yes     Types:  "Marijuana, Inhaled     Comment: e cigarettes, Vaping, and MarijuAnna   • Sexual activity: Yes     Partners: Male     Birth control/protection: I.U.D.       SURGICAL HISTORY   has a past surgical history that includes tonsillectomy and adenoidectomy (2013); other; remove intrauterine device (7/29/2019); dilation and curettage (N/A, 7/29/2019); and hemorrhoidectomy (12/8/2019).    CURRENT MEDICATIONS  Home Medications     Reviewed by Martha Martinez R.N. (Registered Nurse) on 02/23/21 at 2056  Med List Status: Not Addressed   Medication Last Dose Status   albuterol 108 (90 Base) MCG/ACT Aero Soln inhalation aerosol  Active   clonazePAM (KLONOPIN) 0.5 MG Tab  Active   ethinyl estradiol-etonogestrel (NUVARING) 0.12-0.015 MG/24HR vaginal ring  Active   lamoTRIgine (LAMICTAL) 25 MG Tab  Active   ondansetron (ZOFRAN ODT) 4 MG TABLET DISPERSIBLE  Active   VRAYLAR 1.5 MG Cap  Active                ALLERGIES  No Known Allergies    PHYSICAL EXAM  VITAL SIGNS: /75   Pulse 71   Temp 36.1 °C (97 °F) (Temporal)   Resp 16   Ht 1.676 m (5' 6\")   Wt 111 kg (245 lb 9.5 oz)   SpO2 97%   BMI 39.64 kg/m²    Pulse ox interpretation: I interpret this pulse ox as normal.  Constitutional: Alert in no apparent distress.  HENT: Normocephalic atraumatic, dry mucous membranes  Eyes: PER, Conjunctiva normal, Non-icteric.   Neck: Normal range of motion, No tenderness, Supple, No stridor.   Cardiovascular: Regular rate and rhythm, no murmurs.   Thorax & Lungs: Normal breath sounds, No respiratory distress, No wheezing, No chest tenderness.   Abdomen: Bowel sounds normal, Soft, mild epigastric and right upper quadrant tenderness, No pulsatile masses. No peritoneal signs.  Skin: Warm, Dry, No erythema, No rash.   Back: No bony tenderness, No CVA tenderness.   Extremities/MSK: Intact equal distal pulses, No edema, No tenderness, No cyanosis, no major deformities noted  Neurologic: Alert and oriented x3, No focal deficits noted. "       DIFFERENTIAL DIAGNOSIS AND WORK UP PLAN    This is a 26 y.o. female who presents with epigastric right upper quadrant and tenderness after starting new medication and could be medication side effect does not consistent with an allergy, no lower abdominal pain no systemic symptoms that indicate infection will evaluate for cholelithiasis choledocholithiasis versus GERD peptic ulcer disease esophagitis unlikely pancreatitis she is nonalcoholic.  We will treat the patient with antiemetic antacid as well as pain management IV fluids for signs of dehydration reassess    DIAGNOSTIC STUDIES / PROCEDURES    EKG  No results found for this or any previous visit.    LABS  Pertinent Lab Findings  CBC within normal limits CMP showed a mild acidosis without anion gap normal LFTs urinalysis without signs of infection normal lipase she is not pregnant      RADIOLOGY  US-RUQ   Final Result      Normal right upper quadrant ultrasound.        The radiologist's interpretation of all radiological studies have been reviewed by me.      COURSE & MEDICAL DECISION MAKING  Pertinent Labs & Imaging studies reviewed. (See chart for details)    12:36 AM  I reassessed patient at bedside she is feeling quite a bit better pain is improved no longer nauseated had a positive response to IV fluids for her dehydration.  She will be started on Carafate Pepcid we discussed a liquid diet for the next few days and return precautions for any new or worsening issues.  Likely GERD gastritis or peptic ulcer disease, but the treatment is all the same.  She will follow up with her primary care provider she understands feels comfortable going home    I verified that the patient was wearing a mask and I was wearing appropriate PPE every time I entered the room. The patient's mask was on the patient at all times during my encounter except for a brief view of the oropharynx.    The patient will return for new or worsening symptoms and is stable at the time of  discharge.    The patient is referred to a primary physician for blood pressure management, diabetic screening, and for all other preventative health concerns.    DISPOSITION:  Patient will be discharged home in stable condition.    FOLLOW UP:  [facility], Emergency Dept  [address] [address] [phone]    If symptoms worsen      OUTPATIENT MEDICATIONS:  New Prescriptions    FAMOTIDINE (PEPCID) 20 MG TAB    Take 1 tablet by mouth 2 times a day for 30 days.    ONDANSETRON (ZOFRAN ODT) 4 MG TABLET DISPERSIBLE    Take 1 tablet by mouth every 6 hours as needed.    SUCRALFATE (CARAFATE) 1 GM TAB    Take 1 tablet by mouth 4 Times a Day,Before Meals and at Bedtime for 14 days.         FINAL IMPRESSION  1. Epigastric pain     2. Non-intractable vomiting with nausea, unspecified vomiting type     3. Dehydration             Electronically signed by: Natividad Shipman M.D., 2/23/2021 10:21 PM    This dictation has been created using voice recognition software and/or scribes. The accuracy of the dictation is limited by the abilities of the software and the expertise of the scribes. I expect there may be some errors of grammar and possibly content. I made every attempt to manually correct the errors within my dictation. However, errors related to voice recognition software and/or scribes may still exist and should be interpreted within the appropriate context.

## 2021-02-24 NOTE — ED TRIAGE NOTES
Vanessa Sanchez  26 y.o. F  Chief Complaint   Patient presents with   • Abdominal Pain     URQ pain, tender to the touch, sharp burning pain.   • Nausea/Vomiting/Diarrhea     x8 days. Patient has been taking Zofran without relief.    • Sent from Urgent Care     Patient was started on Lexapro on 2-15. Patient was seen at urgent care 2/17 for the above symptoms. Patient was told to stop the Lexapro to see if symptoms improved but they continued.     Vitals:    02/23/21 2008   BP: 130/75   Pulse: 71   Resp: 16   Temp: 36.1 °C (97 °F)   SpO2: 97%       Triage process explained to patient, apologized for wait time, and returned to lob.  Pt informed to notify staff of any change in condition. NAD at this time.

## 2021-07-23 ENCOUNTER — APPOINTMENT (OUTPATIENT)
Dept: RADIOLOGY | Facility: IMAGING CENTER | Age: 26
End: 2021-07-23
Attending: PHYSICIAN ASSISTANT
Payer: COMMERCIAL

## 2021-07-23 ENCOUNTER — OFFICE VISIT (OUTPATIENT)
Dept: URGENT CARE | Facility: CLINIC | Age: 26
End: 2021-07-23
Payer: COMMERCIAL

## 2021-07-23 VITALS
BODY MASS INDEX: 41.28 KG/M2 | OXYGEN SATURATION: 95 % | TEMPERATURE: 96.9 F | HEIGHT: 67 IN | WEIGHT: 263 LBS | SYSTOLIC BLOOD PRESSURE: 116 MMHG | HEART RATE: 91 BPM | RESPIRATION RATE: 16 BRPM | DIASTOLIC BLOOD PRESSURE: 78 MMHG

## 2021-07-23 DIAGNOSIS — M25.361 INSTABILITY OF KNEE JOINT, RIGHT: ICD-10-CM

## 2021-07-23 DIAGNOSIS — M25.561 ACUTE PAIN OF RIGHT KNEE: ICD-10-CM

## 2021-07-23 PROCEDURE — 73564 X-RAY EXAM KNEE 4 OR MORE: CPT | Mod: TC,RT | Performed by: PHYSICIAN ASSISTANT

## 2021-07-23 PROCEDURE — 99214 OFFICE O/P EST MOD 30 MIN: CPT | Performed by: PHYSICIAN ASSISTANT

## 2021-07-23 RX ORDER — VENLAFAXINE HYDROCHLORIDE 150 MG/1
150 CAPSULE, EXTENDED RELEASE ORAL DAILY
COMMUNITY
End: 2022-02-04

## 2021-07-23 RX ORDER — NAPROXEN 500 MG/1
500 TABLET ORAL 2 TIMES DAILY WITH MEALS
Qty: 30 TABLET | Refills: 0 | Status: SHIPPED | OUTPATIENT
Start: 2021-07-23 | End: 2021-09-23 | Stop reason: CLARIF

## 2021-07-23 ASSESSMENT — FIBROSIS 4 INDEX: FIB4 SCORE: 0.45

## 2021-07-23 ASSESSMENT — ENCOUNTER SYMPTOMS
FEVER: 0
NAUSEA: 0
VOMITING: 0
CHILLS: 0

## 2021-07-23 NOTE — LETTER
July 23, 2021    To Whom It May Concern:         This is confirmation that Vanessa Sanchez attended her scheduled appointment with Julio Gilliam P.A.-C. on 7/23/21. Please excuse her from work on 7/23/21. I recommend light duty in the form of seated work only until she is reevaluated by sports medicine.         If you have any questions please do not hesitate to call me at the phone number listed below.    Sincerely,          Julio Gilliam P.A.-C.  [phone]

## 2021-07-23 NOTE — PROGRESS NOTES
Subjective:   Vanessa Sanchez is a 26 y.o. female who presents for Knee Pain (hyperextended yesterday (R) knee)        Ms. Sanchez presents for the evaluation of right knee pain.  She states that she hyperextended the right knee several times yesterday while walking and since then the knee has felt painful behind the kneecap and unstable.  She is having a difficult time walking.  She denies distal numbness and tingling.  No prior surgeries to the knee, but she states that she has injured the knee previously while playing soccer.  These injuries seem to resolve on their own.  Moderate symptomatic improvement with Tylenol and ibuprofen.    Review of Systems   Constitutional: Negative for chills and fever.   Gastrointestinal: Negative for nausea and vomiting.       PMH:  has a past medical history of Anxiety, Asthma, Attention deficit hyperactivity disorder (ADHD) (6/27/2019), Borderline personality disorder (HCC) (2014), Depression, Hypothyroidism (6/27/2019), Sleep apnea, Sleep apnea, and Snoring. She also has no past medical history of Addisons disease ([facility]), Adrenal disorder ([facility]), Allergy, Anemia, Arrhythmia, Arthritis, Blood transfusion without reported diagnosis, Cancer ([facility]), Cataract, CHF (congestive heart failure) ([facility]), Clotting disorder ([facility]), COPD (chronic obstructive pulmonary disease) ([facility]), Cushings syndrome ([facility]), Diabetes ([facility]), Diabetic neuropathy ([facility]), GERD (gastroesophageal reflux disease), Glaucoma, Goiter, Head ache, Heart attack ([facility]), Heart murmur, HIV (human immunodeficiency virus infection) ([facility]), Hyperlipidemia, Hypertension, IBD (inflammatory bowel disease), Kidney disease, Meningitis, Migraine, Muscle disorder, Osteoporosis, Parathyroid disorder ([facility]), Pituitary disease ([facility]), Pulmonary emphysema ([facility]), Seizure ([facility]), Sickle cell disease ([facility]), Stroke ([facility]), Substance abuse ([facility]), or Tuberculosis.  MEDS:   Current Outpatient Medications:   •  venlafaxine (EFFEXOR-XR) 150 MG  "extended-release capsule, Take 150 mg by mouth every day., Disp: , Rfl:   •  naproxen (NAPROSYN) 500 MG Tab, Take 1 tablet by mouth 2 times a day with meals., Disp: 30 tablet, Rfl: 0  •  lamoTRIgine (LAMICTAL) 25 MG Tab, Take 100 mg by mouth every day., Disp: , Rfl:   •  VRAYLAR 1.5 MG Cap, Take 1 Capsule by mouth every morning. Take 1 capsule by mouth every morning, Disp: , Rfl:   •  clonazePAM (KLONOPIN) 0.5 MG Tab, Take 1 mg by mouth 2 Times a Day. TAKE 1 TABLET BY MOUTH TWICE A DAY, Disp: , Rfl:   •  albuterol 108 (90 Base) MCG/ACT Aero Soln inhalation aerosol, Inhale 1 Puff as needed., Disp: , Rfl:   •  ethinyl estradiol-etonogestrel (NUVARING) 0.12-0.015 MG/24HR vaginal ring, USE AS DIRECTED, Disp: 4 Each, Rfl: 3  ALLERGIES: No Known Allergies  SURGHX:   Past Surgical History:   Procedure Laterality Date   • HEMORRHOIDECTOMY  12/8/2019    Procedure: HEMORRHOIDECTOMY;  Surgeon: Manolo Shoemaker M.D.;  Location: SURGERY [facility];  Service: General   • PB REMOVE INTRAUTERINE DEVICE  7/29/2019    Procedure: REMOVAL, INTRAUTERINE DEVICE, HYSTEROSCOPY;  Surgeon: Jamil Rick M.D.;  Location: SURGERY SAME DAY [facility];  Service: Obstetrics   • DILATION AND CURETTAGE N/A 7/29/2019    Procedure: DILATION AND CURETTAGE;  Surgeon: Jamil Rick M.D.;  Location: SURGERY SAME DAY [facility];  Service: Obstetrics   • TONSILLECTOMY AND ADENOIDECTOMY  2013   • OTHER      Turbinate reduction     SOCHX:  reports that she has been smoking. She has been smoking about 0.00 packs per day. She has never used smokeless tobacco. She reports current alcohol use. She reports current drug use. Drugs: Marijuana and Inhaled.  FH: Family history was reviewed, no pertinent findings to report   Objective:   /78 (BP Location: Left arm, Patient Position: Sitting, BP Cuff Size: Adult long)   Pulse 91   Temp 36.1 °C (96.9 °F) (Temporal)   Resp 16   Ht 1.702 m (5' 7\") Comment: pt stated  Wt 119 kg (263 lb)   LMP " 07/14/2021 (Exact Date)   SpO2 95%   Breastfeeding No   BMI 41.19 kg/m²   Physical Exam  Vitals reviewed.   Constitutional:       General: She is not in acute distress.     Appearance: Normal appearance. She is well-developed. She is not toxic-appearing.   HENT:      Head: Normocephalic and atraumatic.      Right Ear: External ear normal.      Left Ear: External ear normal.      Nose: Nose normal.   Eyes:      General: Gaze aligned appropriately.   Cardiovascular:      Rate and Rhythm: Normal rate and regular rhythm.   Pulmonary:      Effort: Pulmonary effort is normal. No respiratory distress.      Breath sounds: No stridor.   Musculoskeletal:      Cervical back: Neck supple.      Comments: Right knee:  Appearance: No erythema, edema, ecchymosis.  Range of motion: Flexion to 140 extension to 5.  Palpation tender to palpation along medial and lateral joint lines, patellar tendon.  Posterior aspect of the knee, quadriceps tendon and hamstrings nontender to palpation. Small effusion.  Neurovascular: Distal sensation intact.  Dorsalis pedis pulse +2.  Special testing: Negative anterior drawer and posterior drawer-patient was guarding.  No laxity with varus and valgus stress at 0 and 15 degrees.  Patient does have a positive Jeremi's-catching and large clunk felt laterally, while the patient experienced medial pain.   Skin:     General: Skin is warm and dry.      Capillary Refill: Capillary refill takes less than 2 seconds.   Neurological:      Mental Status: She is alert and oriented to person, place, and time.      Comments: CN2-12 grossly intact   Psychiatric:         Speech: Speech normal.         Behavior: Behavior normal.         Imaging:     FINDINGS:  Bones: Normal bone mineralization. No fracture. No focal bone lesion.     Joints: Intact. No subluxation. No joint effusion or degenerative changes.     Soft tissues: Within normal limits.     IMPRESSION:     Normal right knee radiography.  Assessment/Plan:    1. Acute pain of right knee  - DX-KNEE COMPLETE 4+ RIGHT; Future  - REFERRAL TO SPORTS MEDICINE  - naproxen (NAPROSYN) 500 MG Tab; Take 1 tablet by mouth 2 times a day with meals.  Dispense: 30 tablet; Refill: 0    2. Instability of knee joint, right  - REFERRAL TO SPORTS MEDICINE  - naproxen (NAPROSYN) 500 MG Tab; Take 1 tablet by mouth 2 times a day with meals.  Dispense: 30 tablet; Refill: 0    Other orders  - venlafaxine (EFFEXOR-XR) 150 MG extended-release capsule; Take 150 mg by mouth every day.    Patient fitted with hinged knee brace.  She may use crutches as needed.  Naproxen twice daily as needed.  I would like her to elevate and ice.  Physical exam today was limited secondary to patient guarding.  Her Jeremi's was positive.  I am concerned about a meniscal and ligamentous injuries.  Patient referred to sports med for further evaluation and treatment, as indicated. Recommend light duties at work.    Differential diagnosis, natural history, supportive care, and indications for immediate follow-up discussed.

## 2021-09-22 ENCOUNTER — TELEPHONE (OUTPATIENT)
Dept: SCHEDULING | Facility: IMAGING CENTER | Age: 26
End: 2021-09-22

## 2021-09-23 ENCOUNTER — OFFICE VISIT (OUTPATIENT)
Dept: MEDICAL GROUP | Facility: PHYSICIAN GROUP | Age: 26
End: 2021-09-23
Payer: COMMERCIAL

## 2021-09-23 ENCOUNTER — HOSPITAL ENCOUNTER (OUTPATIENT)
Dept: LAB | Facility: MEDICAL CENTER | Age: 26
End: 2021-09-23
Attending: PHYSICIAN ASSISTANT
Payer: COMMERCIAL

## 2021-09-23 VITALS
SYSTOLIC BLOOD PRESSURE: 108 MMHG | DIASTOLIC BLOOD PRESSURE: 70 MMHG | BODY MASS INDEX: 42.22 KG/M2 | TEMPERATURE: 98.1 F | OXYGEN SATURATION: 97 % | HEIGHT: 67 IN | HEART RATE: 84 BPM | WEIGHT: 269 LBS | RESPIRATION RATE: 14 BRPM

## 2021-09-23 DIAGNOSIS — E03.9 HYPOTHYROIDISM, UNSPECIFIED TYPE: ICD-10-CM

## 2021-09-23 DIAGNOSIS — Z78.9 VEGAN DIET: ICD-10-CM

## 2021-09-23 DIAGNOSIS — E66.01 MORBID OBESITY WITH BMI OF 40.0-44.9, ADULT (HCC): ICD-10-CM

## 2021-09-23 DIAGNOSIS — F41.9 ANXIETY: ICD-10-CM

## 2021-09-23 DIAGNOSIS — E53.8 B12 DEFICIENCY: ICD-10-CM

## 2021-09-23 PROBLEM — M23.91 INTERNAL DERANGEMENT OF RIGHT KNEE: Status: ACTIVE | Noted: 2021-07-30

## 2021-09-23 PROBLEM — J45.909 ASTHMA: Status: ACTIVE | Noted: 2021-07-30

## 2021-09-23 PROBLEM — M22.2X1 PATELLOFEMORAL SYNDROME OF RIGHT KNEE: Status: ACTIVE | Noted: 2021-08-05

## 2021-09-23 LAB
25(OH)D3 SERPL-MCNC: 41 NG/ML (ref 30–100)
ALBUMIN SERPL BCP-MCNC: 4.1 G/DL (ref 3.2–4.9)
ALBUMIN/GLOB SERPL: 1.3 G/DL
ALP SERPL-CCNC: 74 U/L (ref 30–99)
ALT SERPL-CCNC: 12 U/L (ref 2–50)
ANION GAP SERPL CALC-SCNC: 11 MMOL/L (ref 7–16)
AST SERPL-CCNC: 18 U/L (ref 12–45)
BILIRUB SERPL-MCNC: 0.2 MG/DL (ref 0.1–1.5)
BUN SERPL-MCNC: 10 MG/DL (ref 8–22)
CALCIUM SERPL-MCNC: 8.9 MG/DL (ref 8.5–10.5)
CHLORIDE SERPL-SCNC: 109 MMOL/L (ref 96–112)
CHOLEST SERPL-MCNC: 181 MG/DL (ref 100–199)
CO2 SERPL-SCNC: 19 MMOL/L (ref 20–33)
CREAT SERPL-MCNC: 0.65 MG/DL (ref 0.5–1.4)
ERYTHROCYTE [DISTWIDTH] IN BLOOD BY AUTOMATED COUNT: 42.1 FL (ref 35.9–50)
EST. AVERAGE GLUCOSE BLD GHB EST-MCNC: 85 MG/DL
FASTING STATUS PATIENT QL REPORTED: NORMAL
GLOBULIN SER CALC-MCNC: 3.1 G/DL (ref 1.9–3.5)
GLUCOSE SERPL-MCNC: 87 MG/DL (ref 65–99)
HBA1C MFR BLD: 4.6 % (ref 4–5.6)
HCT VFR BLD AUTO: 47.2 % (ref 37–47)
HDLC SERPL-MCNC: 60 MG/DL
HGB BLD-MCNC: 15.9 G/DL (ref 12–16)
LDLC SERPL CALC-MCNC: 95 MG/DL
MCH RBC QN AUTO: 31 PG (ref 27–33)
MCHC RBC AUTO-ENTMCNC: 33.7 G/DL (ref 33.6–35)
MCV RBC AUTO: 92 FL (ref 81.4–97.8)
PLATELET # BLD AUTO: 276 K/UL (ref 164–446)
PMV BLD AUTO: 11 FL (ref 9–12.9)
POTASSIUM SERPL-SCNC: 4.1 MMOL/L (ref 3.6–5.5)
PROT SERPL-MCNC: 7.2 G/DL (ref 6–8.2)
RBC # BLD AUTO: 5.13 M/UL (ref 4.2–5.4)
SODIUM SERPL-SCNC: 139 MMOL/L (ref 135–145)
TRIGL SERPL-MCNC: 129 MG/DL (ref 0–149)
TSH SERPL DL<=0.005 MIU/L-ACNC: 4.57 UIU/ML (ref 0.38–5.33)
VIT B12 SERPL-MCNC: 198 PG/ML (ref 211–911)
WBC # BLD AUTO: 6 K/UL (ref 4.8–10.8)

## 2021-09-23 PROCEDURE — 80053 COMPREHEN METABOLIC PANEL: CPT

## 2021-09-23 PROCEDURE — 85027 COMPLETE CBC AUTOMATED: CPT

## 2021-09-23 PROCEDURE — 80061 LIPID PANEL: CPT

## 2021-09-23 PROCEDURE — 99214 OFFICE O/P EST MOD 30 MIN: CPT | Performed by: PHYSICIAN ASSISTANT

## 2021-09-23 PROCEDURE — 82607 VITAMIN B-12: CPT

## 2021-09-23 PROCEDURE — 36415 COLL VENOUS BLD VENIPUNCTURE: CPT

## 2021-09-23 PROCEDURE — 82306 VITAMIN D 25 HYDROXY: CPT

## 2021-09-23 PROCEDURE — 84443 ASSAY THYROID STIM HORMONE: CPT

## 2021-09-23 PROCEDURE — 83036 HEMOGLOBIN GLYCOSYLATED A1C: CPT

## 2021-09-23 RX ORDER — LAMOTRIGINE 100 MG/1
TABLET ORAL
COMMUNITY
End: 2022-02-04

## 2021-09-23 RX ORDER — VENLAFAXINE HYDROCHLORIDE 37.5 MG/1
TABLET, EXTENDED RELEASE ORAL
COMMUNITY
End: 2021-09-23

## 2021-09-23 RX ORDER — DIAZEPAM 10 MG/1
TABLET ORAL
COMMUNITY
Start: 2021-09-18 | End: 2022-04-18

## 2021-09-23 RX ORDER — MELOXICAM 15 MG/1
TABLET ORAL
COMMUNITY
End: 2021-09-23

## 2021-09-23 RX ORDER — CYANOCOBALAMIN 1000 UG/ML
1000 INJECTION, SOLUTION INTRAMUSCULAR; SUBCUTANEOUS ONCE
Status: COMPLETED | OUTPATIENT
Start: 2021-09-24 | End: 2021-09-24

## 2021-09-23 RX ORDER — CLONAZEPAM 1 MG/1
TABLET ORAL
COMMUNITY
Start: 2021-08-26 | End: 2021-09-23

## 2021-09-23 ASSESSMENT — FIBROSIS 4 INDEX: FIB4 SCORE: 0.45

## 2021-09-23 NOTE — ASSESSMENT & PLAN NOTE
Was previously on synthroid for 6 months many years ago and then stopped it because she was told she did not need it anymore.  Has not had labs done in years

## 2021-09-23 NOTE — ASSESSMENT & PLAN NOTE
Patient reports she has been having significant anxiety. She is currently being seen by a psychiatrist. Seeing every 3-6 months. Currently changing medications around. Switched from clonazepam to diazepam and increased dose of effexor. Has tried over 20 medications in the past. Seeing a counselor weekly.  Patient would like to get blood work done to evaluate if anything can be contributing to her worsening anxiety

## 2021-09-23 NOTE — PROGRESS NOTES
Subjective:     CC: establish care, anxiety    HPI:   Vanessa presents today with     Anxiety  Patient reports she has been having significant anxiety. She is currently being seen by a psychiatrist. Seeing every 3-6 months. Currently changing medications around. Switched from clonazepam to diazepam and increased dose of effexor. Has tried over 20 medications in the past. Seeing a counselor weekly.  Patient would like to get blood work done to evaluate if anything can be contributing to her worsening anxiety     Hypothyroidism  Was previously on synthroid for 6 months many years ago and then stopped it because she was told she did not need it anymore.  Has not had labs done in years      Past Medical History:   Diagnosis Date   • Anxiety    • Asthma    • Attention deficit hyperactivity disorder (ADHD) 6/27/2019   • Borderline personality disorder (HCC) 2014   • Depression    • Hypothyroidism 6/27/2019   • Sleep apnea    • Sleep apnea     uses c pap   • Snoring        Social History     Tobacco Use   • Smoking status: Current Some Day Smoker     Packs/day: 0.00   • Smokeless tobacco: Never Used   • Tobacco comment: occass   Vaping Use   • Vaping Use: Former   Substance Use Topics   • Alcohol use: Yes     Comment: occass   • Drug use: Yes     Types: Marijuana, Inhaled     Comment: e cigarettes, Vaping, and MarijuAnna       Current Outpatient Medications Ordered in Epic   Medication Sig Dispense Refill   • diazepam (VALIUM) 10 MG tablet      • lamoTRIgine (LAMICTAL) 100 MG Tab lamotrigine 100 mg tablet   TAKE 1 TABLET BY MOUTH EVERY MORNING     • Venlafaxine HCl 37.5 MG TABLET SR 24 HR venlafaxine ER 37.5 mg tablet,extended release 24 hr   TAKE 1 TABLET BY MOUTH FOR 7 DAYS, INCREASE TO 2 TABLETS (75MG) ON DAY 8     • venlafaxine (EFFEXOR-XR) 150 MG extended-release capsule Take 150 mg by mouth every day.     • lamoTRIgine (LAMICTAL) 25 MG Tab Take 100 mg by mouth every day.     • VRAYLAR 1.5 MG Cap Take 1 Capsule by  "mouth every morning. Take 1 capsule by mouth every morning     • albuterol 108 (90 Base) MCG/ACT Aero Soln inhalation aerosol Inhale 1 Puff as needed.     • ethinyl estradiol-etonogestrel (NUVARING) 0.12-0.015 MG/24HR vaginal ring USE AS DIRECTED 4 Each 3   • meloxicam (MOBIC) 15 MG tablet meloxicam 15 mg tablet       No current Epic-ordered facility-administered medications on file.       Allergies:  Patient has no known allergies.    Health Maintenance: Completed    ROS:  Gen: no fevers/chills  ENT: no sore throat  Pulm: no sob, no cough  CV: no chest pain  GI: no nausea/vomiting  : no dysuria  Skin: no rash  Neuro: no headaches  Psych: Positive for depression and anxiety      Objective:       Exam:  /70   Pulse 84   Temp 36.7 °C (98.1 °F)   Resp 14   Ht 1.702 m (5' 7\")   Wt 122 kg (269 lb)   LMP 09/06/2021 (Approximate)   SpO2 97%   BMI 42.13 kg/m²  Body mass index is 42.13 kg/m².    Gen: Alert and oriented, No apparent distress.  Skin: Warm, dry, good turgor, no rashes in visible areas.  HEENT: Normocephalic. Eyes conjunctiva clear lids without ptosis, pupils equal and reactive to light accommodation, ears normal shape and contour  Neck: Trachea midline, no masses, no thyromegaly  Lungs: Normal effort, CTA bilaterally, no wheezes, rhonchi, or rales  CV: Regular rate and rhythm. No murmurs, rubs, or gallops.  MSK: Normal gait, moves all extremities.  Neuro: Grossly non-focal.  Ext: No clubbing, cyanosis, edema.  Psych: Alert and oriented x3, flat affect and mood.     Assessment & Plan:     26 y.o. female with the following -     1. Anxiety  Chronic, managed by psychiatry.  Patient is currently in the process of getting her medications changed around but she is interested in getting blood work done to see if anything could be contributing to her worsening anxiety.  She also is being seen by a counselor weekly which is helpful.    2. Morbid obesity with BMI of 40.0-44.9, adult (HCC)  Chronic, blood " work ordered today.  Patient is inquiring about the possibility of being on phentermine in the future once she is stable on her psychiatric medications.  Discussed the risks and benefits of this including less than 3-month use as well as the necessity for significant lifestyle changes.  We will discuss this more in the future.   - Comp Metabolic Panel; Future  - VITAMIN D,25 HYDROXY; Future  - Lipid Profile; Future  - HEMOGLOBIN A1C; Future  - CBC WITHOUT DIFFERENTIAL; Future    3. Hypothyroidism, unspecified type  Chronic, status unknown.  Labs ordered today.  Given that the patient has had worsening depression and anxiety, discussed that if her thyroid is out of range it is a good idea to potentially treat it if indicated to see if that helps with her mood.  We will follow up once blood work is done.  - TSH WITH REFLEX TO FT4; Future    4. Vegan diet  Patient is on a strictly vegan diet so plan to check vitamin B12 level.   - VITAMIN B12; Future    I spent a total of 32 minutes with record review (including external notes and labs), exam, communication with the patient, communication with other providers, and documentation of this encounter.     Return for After lab work.    Please note that this dictation was created using voice recognition software. I have made every reasonable attempt to correct obvious errors, but I expect that there are errors of grammar and possibly content that I did not discover before finalizing the note.    Electronically signed by Renetta Ortiz PA-C on September 23, 2021

## 2021-09-23 NOTE — PROGRESS NOTES
Patient's blood work shows vitamin B12 deficiency. She would like to proceed with doing monthly injections.  I put in the order for her to come in for an MA visit for B12 injection.  We will plan to do 3 months of injections and then recheck her lab work.

## 2021-09-24 ENCOUNTER — NON-PROVIDER VISIT (OUTPATIENT)
Dept: MEDICAL GROUP | Facility: PHYSICIAN GROUP | Age: 26
End: 2021-09-24
Payer: COMMERCIAL

## 2021-09-24 PROCEDURE — 96372 THER/PROPH/DIAG INJ SC/IM: CPT | Performed by: PHYSICIAN ASSISTANT

## 2021-09-24 RX ADMIN — CYANOCOBALAMIN 1000 MCG: 1000 INJECTION, SOLUTION INTRAMUSCULAR; SUBCUTANEOUS at 09:22

## 2021-09-24 NOTE — PROGRESS NOTES
Vanessa Sanchez is a 26 y.o. female here for a non-provider visit for Vitamin B12 injection.    Reason for injection: B12 Vitamin Deficiency  Order in MAR?: Yes  Patient supplied?:No  Minimum interval has been met for this injection (per MAR order): Yes    Patient tolerated injection and no adverse effects were observed or reported: No    # of Administrations remaining in MAR: 0

## 2022-02-04 ENCOUNTER — HOSPITAL ENCOUNTER (EMERGENCY)
Facility: MEDICAL CENTER | Age: 27
End: 2022-02-04
Payer: COMMERCIAL

## 2022-02-04 ENCOUNTER — OFFICE VISIT (OUTPATIENT)
Dept: URGENT CARE | Facility: CLINIC | Age: 27
End: 2022-02-04
Payer: COMMERCIAL

## 2022-02-04 VITALS
RESPIRATION RATE: 12 BRPM | OXYGEN SATURATION: 99 % | DIASTOLIC BLOOD PRESSURE: 70 MMHG | HEART RATE: 97 BPM | HEIGHT: 67 IN | BODY MASS INDEX: 45.61 KG/M2 | SYSTOLIC BLOOD PRESSURE: 120 MMHG | TEMPERATURE: 97.2 F | WEIGHT: 290.6 LBS

## 2022-02-04 VITALS
HEART RATE: 126 BPM | DIASTOLIC BLOOD PRESSURE: 101 MMHG | TEMPERATURE: 97.7 F | SYSTOLIC BLOOD PRESSURE: 164 MMHG | RESPIRATION RATE: 18 BRPM | HEIGHT: 67 IN | WEIGHT: 291.01 LBS | OXYGEN SATURATION: 97 % | BODY MASS INDEX: 45.67 KG/M2

## 2022-02-04 DIAGNOSIS — K64.4 EXTERNAL HEMORRHOID: ICD-10-CM

## 2022-02-04 PROCEDURE — 99214 OFFICE O/P EST MOD 30 MIN: CPT | Performed by: PHYSICIAN ASSISTANT

## 2022-02-04 PROCEDURE — 302449 STATCHG TRIAGE ONLY (STATISTIC)

## 2022-02-04 RX ORDER — HYDROCORTISONE 25 MG/G
1 CREAM TOPICAL 2 TIMES DAILY
Qty: 28 G | Refills: 0 | Status: SHIPPED | OUTPATIENT
Start: 2022-02-04 | End: 2022-02-11

## 2022-02-04 ASSESSMENT — FIBROSIS 4 INDEX
FIB4 SCORE: 0.55
FIB4 SCORE: 0.55

## 2022-02-04 ASSESSMENT — ENCOUNTER SYMPTOMS
HEADACHES: 0
CHILLS: 0
FEVER: 0
TINGLING: 0
DIARRHEA: 0
PALPITATIONS: 0
ABDOMINAL PAIN: 0
CONSTIPATION: 0
MYALGIAS: 0
BLOOD IN STOOL: 1
VOMITING: 0
DIZZINESS: 0

## 2022-02-04 NOTE — PROGRESS NOTES
Subjective:   Vanessa Sanchez is a 27 y.o. female who presents for Hemorrhoids (Pt has a painful hemorrhoid x yesterday)      HPI:  This is a very pleasant 27-year-old female presented to the clinic with what she believes is a hemorrhoid causing progressively worsening pain x2 days.  Patient has a history of thrombosed external hemorrhoid.  She had her hemorrhoid excised in the emergency department however they were unable to get the bleeding controlled and she underwent surgery for correction.  States currently she has a throbbing pain that has been constant for the last day.  Pain worse with sitting and bowel movements.  Has noticed small amounts of bright red blood in the stool.  She has been trying sitz bath's as well as OTC topical creams and ointments with no improvement.  Denies any fevers, chills, nausea or vomiting.    Review of Systems   Constitutional: Negative for chills, fever and malaise/fatigue.   Cardiovascular: Negative for chest pain and palpitations.   Gastrointestinal: Positive for blood in stool. Negative for abdominal pain, constipation, diarrhea, melena and vomiting.        Painful hemorrhoid x2 days   Genitourinary: Negative for dysuria.   Musculoskeletal: Negative for myalgias.   Neurological: Negative for dizziness, tingling and headaches.       Medications:    • albuterol Aers  • buPROPion  • diazepam  • ethinyl estradiol-etonogestrel  • Spravato (56 MG Dose) Sopk  • venlafaxine  • venlafaxine XR Cp24  • Vraylar Caps    Allergies: Patient has no known allergies.    Problem List: Vanessa Sanchez does not have any pertinent problems on file.    Surgical History:  Past Surgical History:   Procedure Laterality Date   • HEMORRHOIDECTOMY  12/8/2019    Procedure: HEMORRHOIDECTOMY;  Surgeon: Manolo Shoemaker M.D.;  Location: SURGERY [facility];  Service: General   • [place] REMOVE INTRAUTERINE DEVICE  7/29/2019    Procedure: REMOVAL, INTRAUTERINE DEVICE, HYSTEROSCOPY;  Surgeon: Jamil Rick  "M.D.;  Location: SURGERY SAME DAY [facility];  Service: Obstetrics   • DILATION AND CURETTAGE N/A 7/29/2019    Procedure: DILATION AND CURETTAGE;  Surgeon: Jamil Rick M.D.;  Location: SURGERY SAME DAY [facility];  Service: Obstetrics   • TONSILLECTOMY AND ADENOIDECTOMY  2013   • OTHER      Turbinate reduction       Past Social Hx: Vanessa Sanchez  reports that she has been smoking. She has been smoking about 0.00 packs per day. She has never used smokeless tobacco. She reports current alcohol use. She reports current drug use. Drugs: Marijuana and Inhaled.     Past Family Hx:  Vanessa Sanchez family history includes Diabetes in her maternal grandmother and mother; Hyperlipidemia in her maternal grandmother; No Known Problems in her father and sister; Other in her mother; Stroke in her mother.     Problem list, medications, and allergies reviewed by myself today in Epic.     Objective:     /70 (BP Location: Left arm, Patient Position: Standing, BP Cuff Size: Large adult)   Pulse 97   Temp 36.2 °C (97.2 °F) (Temporal)   Resp 12   Ht 1.702 m (5' 7\")   Wt (!) 132 kg (290 lb 9.6 oz)   SpO2 99%   BMI 45.51 kg/m²     Physical Exam  Constitutional:       General: She is not in acute distress.     Appearance: Normal appearance. She is not ill-appearing, toxic-appearing or diaphoretic.   HENT:      Head: Normocephalic and atraumatic.   Eyes:      Conjunctiva/sclera: Conjunctivae normal.   Pulmonary:      Effort: Pulmonary effort is normal.   Genitourinary:     Rectum: Tenderness and external hemorrhoid present. No anal fissure.          Comments: Patient has an external hemorrhoid measuring approximately 1 x 0.5 cm in size.  Minimally erythematous.  Hemorrhoid is not thrombosed.  Tender to the touch.  No active bleeding.  Musculoskeletal:         General: Normal range of motion.      Cervical back: Normal range of motion. No muscular tenderness.   Lymphadenopathy:      Cervical: No cervical " adenopathy.   Skin:     General: Skin is warm and dry.      Capillary Refill: Capillary refill takes less than 2 seconds.   Neurological:      General: No focal deficit present.      Mental Status: She is alert and oriented to person, place, and time. Mental status is at baseline.   Psychiatric:         Mood and Affect: Mood normal.         Thought Content: Thought content normal.           Assessment/Plan:     Comments/MDM:     Advised on measures to decrease constipation and avoid straining such as: to increase hydration, fiber supplements (metamucil or cruciferous vegetables), sitz baths (for 10-15 minutes every few hours), anusol  and follow up with General Surgery for definitve management.    Hemorrhoid is not thrombosed at this time.  No indication for evacuation at this time.  Last episode required surgery to correct bleeding.  Placed a referral for the patient to follow-up with general surgery for any persistence in symptoms.       Diagnosis and associated orders:     1. External hemorrhoid    - hydrocortisone rectal (PERIANAL) 2.5% Cream; Insert 1 Application into the rectum 2 times a day for 7 days.  Dispense: 28 g; Refill: 0  - Lidocaine, Anorectal, 5 % Gel; Apply 1 Application topically 2 times a day.  Dispense: 30 g; Refill: 0  - Referral to General Surgery           Differential diagnosis, natural history, supportive care, and indications for immediate follow-up discussed.    I personally reviewed prior external notes and test results pertinent to today's visit.     Advised the patient to follow-up with the primary care physician for recheck, reevaluation, and consideration of further management.    Please note that this dictation was created using voice recognition software. I have made reasonable attempt to correct obvious errors, but I expect that there are errors of grammar and possibly content that I did not discover before finalizing the note.    This note was electronically signed by CARLOS EDUARDO Hernandez  SHANICE

## 2022-02-05 NOTE — ED TRIAGE NOTES
"Chief Complaint   Patient presents with   • Rectal Pain     PT reports hemroid and increasing pain and was sent here by UC today.     Blood Pressure (Abnormal) 164/101   Pulse (Abnormal) 126   Temperature 36.5 °C (97.7 °F) (Oral)   Respiration 18   Height 1.702 m (5' 7\")   Weight (Abnormal) 132 kg (291 lb 0.1 oz)   Last Menstrual Period 01/10/2022 (Exact Date)   Oxygen Saturation 97%   Body Mass Index 45.58 kg/m²     "

## 2022-03-30 ENCOUNTER — GYNECOLOGY VISIT (OUTPATIENT)
Dept: OBGYN | Facility: CLINIC | Age: 27
End: 2022-03-30
Payer: COMMERCIAL

## 2022-03-30 VITALS
SYSTOLIC BLOOD PRESSURE: 124 MMHG | WEIGHT: 288.6 LBS | HEIGHT: 67 IN | BODY MASS INDEX: 45.3 KG/M2 | DIASTOLIC BLOOD PRESSURE: 72 MMHG

## 2022-03-30 DIAGNOSIS — Z97.5 BREAKTHROUGH BLEEDING ON NEXPLANON: ICD-10-CM

## 2022-03-30 DIAGNOSIS — N92.1 BREAKTHROUGH BLEEDING ON NEXPLANON: ICD-10-CM

## 2022-03-30 PROCEDURE — 99213 OFFICE O/P EST LOW 20 MIN: CPT | Performed by: NURSE PRACTITIONER

## 2022-03-30 RX ORDER — MISOPROSTOL 200 UG/1
200 TABLET ORAL ONCE
Qty: 2 TABLET | Refills: 0 | Status: SHIPPED | OUTPATIENT
Start: 2022-03-30 | End: 2022-03-30

## 2022-03-30 RX ORDER — IBUPROFEN 800 MG/1
800 TABLET ORAL EVERY 8 HOURS PRN
Qty: 30 TABLET | Refills: 0 | Status: SHIPPED | OUTPATIENT
Start: 2022-03-30 | End: 2022-04-18

## 2022-03-30 RX ORDER — ETONOGESTREL AND ETHINYL ESTRADIOL VAGINAL RING .015; .12 MG/D; MG/D
RING VAGINAL
Qty: 4 EACH | Refills: 0 | Status: SHIPPED | OUTPATIENT
Start: 2022-03-30 | End: 2022-04-13

## 2022-03-30 ASSESSMENT — FIBROSIS 4 INDEX: FIB4 SCORE: 0.55

## 2022-03-30 NOTE — PROGRESS NOTES
GYN  To discuss BC  LMP: 03/16/2022  BC: Nuva ring  WT: 288.6 lb  BP: 124/72  Pt states she has yadiel disorders ans it worsens 1-2 weeks before her period. Pt states she has major depression disorder.   Last pap: 2019 Negative. 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

## 2022-03-30 NOTE — PROGRESS NOTES
HPI Comments:  Vanessa Sanchez is a 27 y.o. y.o. female who presents for problem gyn visit: desires to change her birth control. Pt has issues with depression and notices her moods get much worse around the time of her period and is wondering birth control options that will help control this. Patient's last menstrual period was 03/16/2022 (exact date).  Reports she has been using the nuva ring but sometimes forgets to insert it and is interested in an IUD. She used the Mirena successfully for five years but when it came time to remove it, it had to be surgically removed.     Review of Systems :  Constitutional: Denies any issues  EENT: Denies any issues  Cardio: Denies any issues  Resp: Denies any issues  GI: Denies any issues  : Denies any issues  Pertinent positives documented in HPI and all other systems reviewed & are negative    All PMH, PSH, allergies, social history and FH reviewed and updated today:  Past Medical History:   Diagnosis Date   • Anxiety    • Asthma    • Attention deficit hyperactivity disorder (ADHD) 6/27/2019   • Depression    • Hypothyroidism 6/27/2019   • Sleep apnea    • Sleep apnea     uses c pap   • Snoring      Past Surgical History:   Procedure Laterality Date   • HEMORRHOIDECTOMY  12/8/2019    Procedure: HEMORRHOIDECTOMY;  Surgeon: Manolo Shoemaker M.D.;  Location: SURGERY [facility];  Service: General   • MT REMOVE INTRAUTERINE DEVICE  7/29/2019    Procedure: REMOVAL, INTRAUTERINE DEVICE, HYSTEROSCOPY;  Surgeon: Jamil Rick M.D.;  Location: SURGERY SAME DAY [facility];  Service: Obstetrics   • DILATION AND CURETTAGE N/A 7/29/2019    Procedure: DILATION AND CURETTAGE;  Surgeon: Jamil Rick M.D.;  Location: SURGERY SAME DAY [facility];  Service: Obstetrics   • TONSILLECTOMY AND ADENOIDECTOMY  2013   • OTHER      Turbinate reduction     Patient has no known allergies.  Social History     Socioeconomic History   • Marital status: Single   Tobacco Use   •  "Smoking status: Current Some Day Smoker     Packs/day: 0.00   • Smokeless tobacco: Never Used   Vaping Use   • Vaping Use: Former   Substance and Sexual Activity   • Alcohol use: Yes     Comment: occass   • Drug use: Yes     Types: Marijuana, Inhaled     Comment: e cigarettes, Vaping, and MarijuAnna   • Sexual activity: Yes     Partners: Male     Birth control/protection: Ring     Comment: engaged     Family History   Problem Relation Age of Onset   • Diabetes Mother    • Stroke Mother    • Other Mother         fibromyalgia   • No Known Problems Father    • Diabetes Maternal Grandmother    • Hyperlipidemia Maternal Grandmother    • No Known Problems Sister      Medications:   Current Outpatient Medications Ordered in Epic   Medication Sig Dispense Refill   • ethinyl estradiol-etonogestrel (NUVARING) 0.12-0.015 MG/24HR vaginal ring USE AS DIRECTED 4 Each 0   • venlafaxine (EFFEXOR-XR) 150 MG extended-release capsule Take 1 capsule by mouth once a day with 75mg capsule. Total dose 225mg every morning 90 Capsule 0   • Cariprazine HCl (VRAYLAR) 1.5 MG Cap Take 1 capsule by mouth every evening for mood. 90 Capsule 0   • Esketamine HCl, 56 MG Dose, (SPRAVATO, 56 MG DOSE,) 28 MG/DEVICE Solution Therapy Pack Spray 2 spray into both nostrils twice a week 2 Each 0   • diazepam (VALIUM) 10 MG tablet      • albuterol 108 (90 Base) MCG/ACT Aero Soln inhalation aerosol Inhale 1 Puff as needed.       No current Epic-ordered facility-administered medications on file.          Objective:   Vital measurements:  /72 (BP Location: Left arm, Patient Position: Sitting)   Ht 1.702 m (5' 7\")   Wt (!) 131 kg (288 lb 9.6 oz)   Body mass index is 45.2 kg/m². (Goal BM I>18 <25)    Physical Exam   Nursing note and vitals reviewed.  Constitutional: She is oriented to person, place, and time. She appears well-developed and well-nourished. No distress.     Abdominal: Soft. Bowel sounds are normal. She exhibits no distension and no mass. No " tenderness. She has no rebound and no guarding.     Breast exam deferred.     Genitourinary:  Pelvic exam was deferred.     Neurological: She is alert and oriented to person, place, and time. She exhibits normal muscle tone.     Skin: Skin is warm and dry. No rash noted. She is not diaphoretic. No erythema. No pallor.     Psychiatric: She has a normal mood and affect. Her behavior is normal. Judgment and thought content normal.        Assessment:     1. Breakthrough bleeding on Nexplanon  ethinyl estradiol-etonogestrel (NUVARING) 0.12-0.015 MG/24HR vaginal ring         Plan:   Pt due for pap in June, declines STI testing  Will return for Paragard IUD insertion due to traumatic experience with last insertion, will plan for cytotec and ibuprofen 800mg prior to procedure  Refill on nuvaring as needed until IUD insertion     No follow-ups on file.

## 2022-03-31 ENCOUNTER — GYNECOLOGY VISIT (OUTPATIENT)
Dept: OBGYN | Facility: CLINIC | Age: 27
End: 2022-03-31
Payer: COMMERCIAL

## 2022-03-31 VITALS
WEIGHT: 292 LBS | BODY MASS INDEX: 45.83 KG/M2 | SYSTOLIC BLOOD PRESSURE: 120 MMHG | HEIGHT: 67 IN | DIASTOLIC BLOOD PRESSURE: 74 MMHG

## 2022-03-31 DIAGNOSIS — Z30.430 ENCOUNTER FOR IUD INSERTION: ICD-10-CM

## 2022-03-31 LAB
INT CON NEG: NORMAL
INT CON POS: NORMAL
POC URINE PREGNANCY TEST: NEGATIVE

## 2022-03-31 PROCEDURE — 99214 OFFICE O/P EST MOD 30 MIN: CPT | Mod: 25,GC | Performed by: NURSE PRACTITIONER

## 2022-03-31 PROCEDURE — 81025 URINE PREGNANCY TEST: CPT | Performed by: NURSE PRACTITIONER

## 2022-03-31 PROCEDURE — 58300 INSERT INTRAUTERINE DEVICE: CPT | Performed by: NURSE PRACTITIONER

## 2022-03-31 ASSESSMENT — FIBROSIS 4 INDEX: FIB4 SCORE: 0.55

## 2022-03-31 NOTE — PROCEDURES
IUD: Mirena is choice:      Procedure note  Urine pregnancy test is negative, informed consent was previously signed  The bimanual exam is performed the uterus is noted to be 7 weeks in size and is mid position  A speculum was inserted into the vagina, the cervix was cleansed with Betadine swabs x3  Tenaculum was placed on the anterior lip of the cervix at 11:00 and 1:00   The uterus was sounded to 7.5 centimeters  The IUD is placed under sterile conditions:   The strings trimmed to approximately 3 cm  Tenaculum was removed from the cervix and hemostasis was achieved with pressure only  The patient tolerated the procedure well    Lot:[id number]  Exp: 2/2024  Patient is asked to followup in 5-6 weeks for IUD check. The patient is asked to remain on pelvic rest for 2-3 days.  Use a backup method for 7 days, condoms. She is asked to return sooner than 5-6 weeks for heavy vaginal bleeding uncontrolled pain or fever

## 2022-03-31 NOTE — PROGRESS NOTES
Urine pregnancy test negative but pt reports possible unprotected sex when forgetting to put her Nuvaring. Reports sex was right after her period on 3/16/22. Reviewed with pt that there could be still be a risk for early pregnancy if she was not using her Nuvaring correctly and so placement of IUD today could cause a miscarriage, or a pregnancy could continue with the IUD in place. She desires to proceed with placement today rather than waiting two weeks.     She was planning on placing the paragard today due to wanting a non-hormonal option due to her issues with depression but after further discussion on risk for increased bleeding with Paragard pt would like to try the Mirena again as she has used this before with success. Reviewed the risk of this device affecting her moods is very low due to the low dose of hormone over a long period of time.     Today the patient is counseled on the risks and benefits of IUD insertion. She is aware of other birth control options and prefers to use this method. I also discussed with the patient the risk of infection on insertion, and had asked the patient to remain on pelvic rest for one week following the insertion. We also discussed the risk of IUD expulsion, the risk of uterine perforation and IUD migration. If the IUD does migrate the patient may require a separate procedure such as a laparoscopy to retrieve the migrated IUD. I also discussed the 1% risk of pregnancy with IUD use. I also discussed the side effects of Mirena which can be amenorrhea or dysfunctional uterine bleeding or spotting.  Patient had the opportunity to ask questions regarding insertion, risks and benefits, all questions are answered in their entirety.  Informed consent is signed.    Patient handout given on IUD insertion and post care instructions.     Risks: Irregular bleeding, pain during and after insertion, migration of device, expulsion of device, pregnancy (ectopic is more common in women with  IUD's).  Benefits: decreased bleeding and cramping, 99.2% effective contraception, good for 5 years, concealed method, well tolerated by most recipients.   Insertion risks are: infection and perforation of the uterus, rare but can happen. Watch for fever, foul smelling discharge, heavy bleeding and abd pain not controlled with Ibuprofen.   RTC in 4-6 weeks for IUD check.

## 2022-03-31 NOTE — NON-PROVIDER
Pt here for IUD insertion (Mirena)  LMP: 3/16/22  Phone: [phone]  Pharmacy verified  Consent signed  POCT HCG - negative

## 2022-04-13 ENCOUNTER — OFFICE VISIT (OUTPATIENT)
Dept: URGENT CARE | Facility: CLINIC | Age: 27
End: 2022-04-13
Payer: COMMERCIAL

## 2022-04-13 VITALS
BODY MASS INDEX: 45.99 KG/M2 | TEMPERATURE: 97.6 F | SYSTOLIC BLOOD PRESSURE: 124 MMHG | HEIGHT: 67 IN | OXYGEN SATURATION: 99 % | RESPIRATION RATE: 14 BRPM | WEIGHT: 293 LBS | HEART RATE: 90 BPM | DIASTOLIC BLOOD PRESSURE: 88 MMHG

## 2022-04-13 DIAGNOSIS — M54.9 ACUTE BILATERAL BACK PAIN, UNSPECIFIED BACK LOCATION: ICD-10-CM

## 2022-04-13 DIAGNOSIS — M54.2 NECK PAIN: ICD-10-CM
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
## 2022-04-13 PROCEDURE — 99213 OFFICE O/P EST LOW 20 MIN: CPT | Performed by: FAMILY MEDICINE

## 2022-04-13 RX ORDER — KETOROLAC TROMETHAMINE 30 MG/ML
30 INJECTION, SOLUTION INTRAMUSCULAR; INTRAVENOUS ONCE
Status: COMPLETED | OUTPATIENT
Start: 2022-04-13 | End: 2022-04-13

## 2022-04-13 RX ORDER — CYCLOBENZAPRINE HCL 5 MG
5 TABLET ORAL 2 TIMES DAILY PRN
Qty: 15 TABLET | Refills: 0 | Status: SHIPPED | OUTPATIENT
Start: 2022-04-13 | End: 2022-04-18

## 2022-04-13 RX ORDER — BUPROPION HYDROCHLORIDE 300 MG/1
450 TABLET ORAL EVERY MORNING
COMMUNITY

## 2022-04-13 RX ADMIN — KETOROLAC TROMETHAMINE 30 MG: 30 INJECTION, SOLUTION INTRAMUSCULAR; INTRAVENOUS at 09:10

## 2022-04-13 ASSESSMENT — FIBROSIS 4 INDEX: FIB4 SCORE: 0.55
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
## 2022-04-18 ENCOUNTER — OFFICE VISIT (OUTPATIENT)
Dept: INTERNAL MEDICINE | Facility: OTHER | Age: 27
End: 2022-04-18
Payer: COMMERCIAL

## 2022-04-18 VITALS
SYSTOLIC BLOOD PRESSURE: 143 MMHG | HEIGHT: 68 IN | WEIGHT: 293 LBS | DIASTOLIC BLOOD PRESSURE: 89 MMHG | OXYGEN SATURATION: 95 % | BODY MASS INDEX: 44.41 KG/M2 | TEMPERATURE: 97.8 F | HEART RATE: 88 BPM

## 2022-04-18 DIAGNOSIS — Z70.9 SEXUAL COUNSELING: ICD-10-CM

## 2022-04-18 DIAGNOSIS — Z78.9 VEGAN: ICD-10-CM

## 2022-04-18 DIAGNOSIS — G47.30 SLEEP APNEA, UNSPECIFIED TYPE: ICD-10-CM

## 2022-04-18 DIAGNOSIS — J45.909 MILD ASTHMA WITHOUT COMPLICATION, UNSPECIFIED WHETHER PERSISTENT: ICD-10-CM

## 2022-04-18 DIAGNOSIS — E66.01 MORBID OBESITY WITH BMI OF 40.0-44.9, ADULT (HCC): ICD-10-CM

## 2022-04-18 DIAGNOSIS — F33.2 SEVERE EPISODE OF RECURRENT MAJOR DEPRESSIVE DISORDER, WITHOUT PSYCHOTIC FEATURES (HCC): ICD-10-CM

## 2022-04-18 DIAGNOSIS — R03.0 ELEVATED BP WITHOUT DIAGNOSIS OF HYPERTENSION: ICD-10-CM

## 2022-04-18 DIAGNOSIS — F17.200 TOBACCO USE DISORDER: ICD-10-CM

## 2022-04-18 PROBLEM — F39 MOOD DISORDER (HCC): Status: RESOLVED | Noted: 2019-06-27 | Resolved: 2022-04-18

## 2022-04-18 PROBLEM — E03.9 HYPOTHYROIDISM: Status: RESOLVED | Noted: 2019-06-27 | Resolved: 2022-04-18

## 2022-04-18 PROBLEM — K64.5 THROMBOSED EXTERNAL HEMORRHOID: Status: RESOLVED | Noted: 2019-12-08 | Resolved: 2022-04-18

## 2022-04-18 PROBLEM — F90.9 ATTENTION DEFICIT HYPERACTIVITY DISORDER (ADHD): Status: RESOLVED | Noted: 2019-06-27 | Resolved: 2022-04-18

## 2022-04-18 PROCEDURE — 99214 OFFICE O/P EST MOD 30 MIN: CPT | Mod: GC,25 | Performed by: STUDENT IN AN ORGANIZED HEALTH CARE EDUCATION/TRAINING PROGRAM

## 2022-04-18 PROCEDURE — 99406 BEHAV CHNG SMOKING 3-10 MIN: CPT | Mod: GC | Performed by: STUDENT IN AN ORGANIZED HEALTH CARE EDUCATION/TRAINING PROGRAM

## 2022-04-18 RX ORDER — METHOCARBAMOL 750 MG/1
TABLET, FILM COATED ORAL
COMMUNITY
Start: 2022-04-07 | End: 2022-04-18

## 2022-04-18 RX ORDER — NAPROXEN 500 MG/1
TABLET ORAL
COMMUNITY
Start: 2022-04-07 | End: 2022-05-26

## 2022-04-18 RX ORDER — VENLAFAXINE HYDROCHLORIDE 75 MG/1
CAPSULE, EXTENDED RELEASE ORAL
COMMUNITY
Start: 2022-03-31 | End: 2022-04-18

## 2022-04-18 RX ORDER — MISOPROSTOL 200 UG/1
TABLET ORAL
COMMUNITY
Start: 2022-03-30 | End: 2022-04-18

## 2022-04-18 RX ORDER — BUPROPION HYDROCHLORIDE 150 MG/1
TABLET ORAL
COMMUNITY
Start: 2022-03-31 | End: 2022-04-18

## 2022-04-18 RX ORDER — CARIPRAZINE 3 MG/1
CAPSULE, GELATIN COATED ORAL
COMMUNITY
Start: 2022-04-11 | End: 2022-04-18

## 2022-04-18 RX ORDER — DOCUSATE SODIUM 100 MG/1
CAPSULE, LIQUID FILLED ORAL
COMMUNITY
Start: 2022-02-05 | End: 2022-04-18

## 2022-04-18 RX ORDER — VENLAFAXINE HYDROCHLORIDE 150 MG/1
CAPSULE, EXTENDED RELEASE ORAL
COMMUNITY
Start: 2022-04-13 | End: 2022-04-18

## 2022-04-18 RX ORDER — ALBUTEROL SULFATE 2.5 MG/3ML
2.5 SOLUTION RESPIRATORY (INHALATION) EVERY 4 HOURS PRN
Qty: 1 EACH | Refills: 0 | Status: SHIPPED | OUTPATIENT
Start: 2022-04-18 | End: 2022-05-18

## 2022-04-18 RX ORDER — HYDROCORTISONE ACETATE 25 MG/1
SUPPOSITORY RECTAL
COMMUNITY
Start: 2022-02-05 | End: 2022-04-18

## 2022-04-18 RX ORDER — CARIPRAZINE 3 MG/1
1 CAPSULE, GELATIN COATED ORAL EVERY MORNING
COMMUNITY
Start: 2022-03-31 | End: 2023-05-23

## 2022-04-18 ASSESSMENT — FIBROSIS 4 INDEX: FIB4 SCORE: 0.55
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
## 2022-04-19 ENCOUNTER — TELEPHONE (OUTPATIENT)
Dept: INTERNAL MEDICINE | Facility: OTHER | Age: 27
End: 2022-04-19
Payer: COMMERCIAL

## 2022-04-19 PROBLEM — Z70.9 SEXUAL COUNSELING: Status: ACTIVE | Noted: 2022-04-19

## 2022-04-19 NOTE — ASSESSMENT & PLAN NOTE
Patient is aware that she is obese. She reports recently gaining a significant amount of weight. She is agreeable to nutrition referral. Carefully discussed bariatric surgery options for the patient, as her BMI is 45, and she also has JAH. Patient reports being open to a consultation to discuss her options. She initially reported that she would like to try dietary modifications on her own first, as some family members had bariatric surgery and she did not like their restrictive diet. Will follow up this discussion next visit.

## 2022-04-19 NOTE — ASSESSMENT & PLAN NOTE
Patient reports that she is trying to quit smoking. Currently down to 1/2 pack daily. She was recently started on bupropion for depression by here psychiatrist. She was wondering if varenicline may help with her smoking cessation goals, as she has tried nicotine replacement options previously, including patches and gum, both of which did not work. Patient is agreeable to getting formal tobacco cessation counseling. Encouraged to continue to take buproprion only, as varenicline is contraindicated due to her extensive psychiatric history. Will follow up with patient in 5 wk to evaluate if the buproprion helped. She reports having lots of help at home regarding support. Multiple family members and friends are all quitting smoking together.

## 2022-04-19 NOTE — ASSESSMENT & PLAN NOTE
Regarding preventive health, patient is following with OBGYN for IUD. Has scheduled appointment for IUD string check and pap smear in May 2022. She is due for COVID booster. Patient counseled about the importance of this vaccination. Also advised to get gardasil vaccine from pharmacy as well. She is currently sexually active with her fiancee.

## 2022-04-19 NOTE — TELEPHONE ENCOUNTER
Patient picked up nebulizer medication but did not get the machine. Can you place a DME order for the machine? Patient also requesting to have her inhaler refilled.

## 2022-04-20 RX ORDER — ALBUTEROL SULFATE 90 UG/1
1 AEROSOL, METERED RESPIRATORY (INHALATION) PRN
Qty: 8.5 G | Refills: 3 | Status: SHIPPED | OUTPATIENT
Start: 2022-04-20

## 2022-04-20 NOTE — TELEPHONE ENCOUNTER
Order,demo, and notes faxed to [facility] [phone]. I confirmed with them that they take patient's insurance.   Left message for patient with information.

## 2022-05-02 ENCOUNTER — PATIENT MESSAGE (OUTPATIENT)
Dept: HEALTH INFORMATION MANAGEMENT | Facility: OTHER | Age: 27
End: 2022-05-02

## 2022-05-26 ENCOUNTER — HOSPITAL ENCOUNTER (OUTPATIENT)
Facility: MEDICAL CENTER | Age: 27
End: 2022-05-26
Attending: NURSE PRACTITIONER
Payer: COMMERCIAL

## 2022-05-26 ENCOUNTER — GYNECOLOGY VISIT (OUTPATIENT)
Dept: OBGYN | Facility: CLINIC | Age: 27
End: 2022-05-26
Payer: COMMERCIAL

## 2022-05-26 VITALS
DIASTOLIC BLOOD PRESSURE: 70 MMHG | WEIGHT: 292 LBS | BODY MASS INDEX: 45.83 KG/M2 | SYSTOLIC BLOOD PRESSURE: 124 MMHG | HEIGHT: 67 IN

## 2022-05-26 DIAGNOSIS — Z01.419 WELL WOMAN EXAM: ICD-10-CM

## 2022-05-26 PROCEDURE — 88175 CYTOPATH C/V AUTO FLUID REDO: CPT

## 2022-05-26 PROCEDURE — G0101 CA SCREEN;PELVIC/BREAST EXAM: HCPCS | Performed by: NURSE PRACTITIONER

## 2022-05-26 RX ORDER — NICOTINE 21 MG/24HR
1 PATCH, TRANSDERMAL 24 HOURS TRANSDERMAL EVERY 24 HOURS
Qty: 30 PATCH | Refills: 4 | Status: SHIPPED | OUTPATIENT
Start: 2022-05-26 | End: 2023-05-23

## 2022-05-26 RX ORDER — PRAZOSIN HYDROCHLORIDE 2 MG/1
2 CAPSULE ORAL NIGHTLY
COMMUNITY

## 2022-05-26 ASSESSMENT — FIBROSIS 4 INDEX: FIB4 SCORE: 0.55
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
## 2022-05-26 NOTE — NON-PROVIDER
Pt here for Annual  LMP: unknown  Phone: [phone]  Pharmacy verified  Pt has Mirena (inserted 3/31/22)  Pt has bleeding and cramping since IUD insertion

## 2022-05-27 DIAGNOSIS — Z01.419 WELL WOMAN EXAM: ICD-10-CM

## 2022-05-27 LAB
AMBIGUOUS DTTM AMBI4: NORMAL
CYTOLOGY REG CYTOL: NORMAL

## 2022-10-11 ENCOUNTER — OFFICE VISIT (OUTPATIENT)
Dept: URGENT CARE | Facility: CLINIC | Age: 27
End: 2022-10-11
Payer: COMMERCIAL

## 2022-10-11 VITALS
WEIGHT: 293 LBS | HEART RATE: 79 BPM | TEMPERATURE: 97.6 F | OXYGEN SATURATION: 98 % | HEIGHT: 67 IN | DIASTOLIC BLOOD PRESSURE: 70 MMHG | BODY MASS INDEX: 45.99 KG/M2 | RESPIRATION RATE: 24 BRPM | SYSTOLIC BLOOD PRESSURE: 110 MMHG

## 2022-10-11 DIAGNOSIS — H81.10 BENIGN PAROXYSMAL POSITIONAL VERTIGO, UNSPECIFIED LATERALITY: ICD-10-CM

## 2022-10-11 PROBLEM — F17.210 CIGARETTE NICOTINE DEPENDENCE WITHOUT COMPLICATION: Status: ACTIVE | Noted: 2022-07-23

## 2022-10-11 PROBLEM — R45.851 SUICIDAL IDEATIONS: Status: ACTIVE | Noted: 2022-07-23

## 2022-10-11 PROBLEM — Z86.59 HISTORY OF ANXIETY: Status: ACTIVE | Noted: 2022-07-23

## 2022-10-11 PROBLEM — F32.9 MDD (MAJOR DEPRESSIVE DISORDER): Status: ACTIVE | Noted: 2022-07-23

## 2022-10-11 PROBLEM — F12.90 MARIJUANA USE: Status: ACTIVE | Noted: 2022-07-23

## 2022-10-11 PROBLEM — M23.91 INTERNAL DERANGEMENT OF RIGHT KNEE: Status: ACTIVE | Noted: 2021-07-30

## 2022-10-11 PROBLEM — Z86.59 HISTORY OF BORDERLINE PERSONALITY DISORDER: Status: ACTIVE | Noted: 2022-07-23

## 2022-10-11 PROCEDURE — 99213 OFFICE O/P EST LOW 20 MIN: CPT | Performed by: NURSE PRACTITIONER

## 2022-10-11 RX ORDER — VILAZODONE HYDROCHLORIDE 40 MG/1
40 TABLET ORAL EVERY MORNING
COMMUNITY

## 2022-10-11 RX ORDER — BUPROPION HYDROCHLORIDE 300 MG/1
300 TABLET ORAL DAILY
COMMUNITY
End: 2023-09-13

## 2022-10-11 ASSESSMENT — FIBROSIS 4 INDEX: FIB4 SCORE: 0.55

## 2022-10-11 NOTE — PROGRESS NOTES
Chief Complaint   Patient presents with    Vertigo     X 48 hours       HISTORY OF PRESENT ILLNESS: Patient is a pleasant 27 y.o. female who presents to urgent care today with complaints of vertigo.  Patient notes that since yesterday she has had vertigo-like sensations, described as the room is spinning.  Symptoms worsen with head movement, improved with stillness.  She endorses associated nausea.  She does have a history of vertigo, this feels similar.  She has tried the Epley maneuver without improvement.  She denies any fever, chills, malaise, numbness, tingling, injury or trauma.        Patient Active Problem List    Diagnosis Date Noted    History of anxiety 07/23/2022    History of borderline personality disorder 07/23/2022    Marijuana use 07/23/2022    MDD (major depressive disorder) 07/23/2022    Suicidal ideations 07/23/2022    Cigarette nicotine dependence without complication 07/23/2022    Severe episode of recurrent major depressive disorder, without psychotic features ([facility]) 04/18/2022    Tobacco use disorder 04/18/2022    Elevated BP without diagnosis of hypertension 04/18/2022    Morbid obesity with BMI of 40.0-44.9, adult (HCC) 09/23/2021    Anxiety 09/23/2021    Vegan 09/23/2021    Patellofemoral syndrome of right knee 08/05/2021    Asthma 07/30/2021    Internal derangement of right knee 07/30/2021    Borderline personality disorder ([facility]) 06/27/2019       Allergies:Patient has no known allergies.    Current Outpatient Medications Ordered in Epic   Medication Sig Dispense Refill    Vilazodone HCl 40 MG Tab Take  by mouth.      Esketamine HCl, 56 MG Dose, (SPRAVATO, 56 MG DOSE,) 28 MG/DEVICE Solution Therapy Pack Spray 2 spray into both nostrils twice a week 2 Each 0    prazosin (MINIPRESS) 2 MG Cap Take 2 mg by mouth every evening.      nicotine (NICODERM) 21 MG/24HR PATCH 24 HR Place 1 Patch on the skin every 24 hours. 30 Patch 4    albuterol 108 (90 Base) MCG/ACT Aero Soln inhalation aerosol  Inhale 1 Puff as needed. 8.5 g 3    VRAYLAR 3 MG Cap Take 1 Capsule by mouth every morning. Take 1 capsule by mouth every morning      buPROPion (WELLBUTRIN XL) 300 MG XL tablet Take 300 mg by mouth every morning.      buPROPion (WELLBUTRIN XL) 300 MG XL tablet Take 300 mg by mouth every day.      levonorgestrel (MIRENA) 20 MCG/DAY IUD by Intrauterine route.       No current [facility]-ordered facility-administered medications on file.       Past Medical History:   Diagnosis Date    Anxiety     Asthma     Attention deficit hyperactivity disorder (ADHD) 6/27/2019    Depression     Hypothyroidism 6/27/2019    Sleep apnea     uses c pap    Snoring        Social History     Tobacco Use    Smoking status: Every Day     Packs/day: 0.50     Types: Cigarettes    Smokeless tobacco: Never    Tobacco comments:     pt has smoked for 10 years   Vaping Use    Vaping Use: Former   Substance Use Topics    Alcohol use: Yes     Comment: rare    Drug use: Yes     Types: Marijuana, Inhaled     Comment: e cigarettes, Vaping, and MarijuAnna       Family Status   Relation Name Status    Mo  Alive    Fa  Alive    MGMo  Alive    MGFa  Alive    PGMo  Alive    PGFa  Alive    Sis 1/2 Alive     Family History   Problem Relation Age of Onset    Diabetes Mother     Stroke Mother     Other Mother         fibromyalgia    No Known Problems Father     Diabetes Maternal Grandmother     Hyperlipidemia Maternal Grandmother     No Known Problems Sister        ROS:  Review of Systems   Constitutional: Negative for fever, chills, weight loss, malaise, and fatigue.   HENT: Negative for ear pain, nosebleeds, congestion, sore throat and neck pain.    Eyes: Negative for vision changes.   Neuro: Positive for vertigo-like sensation.  Negative for headache, sensory changes, weakness, seizure, LOC.   Cardiovascular: Negative for chest pain, palpitations, orthopnea and leg swelling.   Respiratory: Negative for cough, sputum production, shortness of breath and wheezing.  "  Gastrointestinal: Negative for abdominal pain, nausea, vomiting or diarrhea.   Genitourinary: Negative for dysuria, urgency and frequency.  Musculoskeletal: Negative for falls, neck pain, back pain, joint pain, myalgias.   Skin: Negative for rash, diaphoresis.     Exam:  /70 (BP Location: Left arm, Patient Position: Sitting, BP Cuff Size: Large adult long)   Pulse 79   Temp 36.4 °C (97.6 °F) (Temporal)   Resp (!) 24   Ht 1.702 m (5' 7\")   Wt (!) 134 kg (295 lb)   SpO2 98%   General: well-nourished, well-developed female in NAD  Head: normocephalic, atraumatic  Eyes: PERRLA, no conjunctival injection, acuity grossly intact, lids normal.  Ears: normal shape and symmetry, no tenderness, no discharge. External canals are without any significant edema or erythema. Tympanic membranes are without any inflammation, no effusion. Gross auditory acuity is intact.  Nose: symmetrical without tenderness, no discharge.  Mouth/Throat: reasonable hygiene, no erythema, exudates or tonsillar enlargement.  Neck: no masses, range of motion within normal limits, no tracheal deviation. No obvious thyroid enlargement.   Lymph: no cervical adenopathy. No supraclavicular adenopathy.   Neuro: alert and oriented. Cranial nerves 1-12 grossly intact. No sensory deficit.  Symptoms reproducible with head movement.  Cardiovascular: regular rate and rhythm. No edema.  Pulmonary: no distress. Chest is symmetrical with respiration, no wheezes, crackles, or rhonchi.   Musculoskeletal: no clubbing, appropriate muscle tone, gait is stable.  Skin: warm, dry, intact, no clubbing, no cyanosis, no rashes.   Psych: appropriate mood, affect, judgement.         Assessment/Plan:  1. Benign paroxysmal positional vertigo, unspecified laterality            Presentation consistent with BPPV.  OTC Flonase encouraged.  Instructed to refrain from driving while symptomatic.  Supportive care, differential diagnoses, and indications for immediate follow-up " discussed with patient.   Pathogenesis of diagnosis discussed including typical length and natural progression.   Instructed to return to clinic or nearest emergency department for any change in condition, further concerns, or worsening of symptoms.  Patient states understanding of the plan of care and discharge instructions.  Instructed to make an appointment, for follow up, with her primary care provider.        Please note that this dictation was created using voice recognition software. I have made every reasonable attempt to correct obvious errors, but I expect that there are errors of grammar and possibly content that I did not discover before finalizing the note.      TIFF Valenzuela.

## 2022-11-23 ENCOUNTER — APPOINTMENT (OUTPATIENT)
Dept: URGENT CARE | Facility: CLINIC | Age: 27
End: 2022-11-23
Payer: COMMERCIAL

## 2023-05-01 ENCOUNTER — OFFICE VISIT (OUTPATIENT)
Dept: URGENT CARE | Facility: CLINIC | Age: 28
End: 2023-05-01
Payer: MEDICAID

## 2023-05-01 ENCOUNTER — APPOINTMENT (OUTPATIENT)
Dept: RADIOLOGY | Facility: MEDICAL CENTER | Age: 28
End: 2023-05-01
Attending: EMERGENCY MEDICINE
Payer: MEDICAID

## 2023-05-01 ENCOUNTER — HOSPITAL ENCOUNTER (EMERGENCY)
Facility: MEDICAL CENTER | Age: 28
End: 2023-05-02
Attending: EMERGENCY MEDICINE
Payer: MEDICAID

## 2023-05-01 VITALS
RESPIRATION RATE: 16 BRPM | OXYGEN SATURATION: 98 % | TEMPERATURE: 98 F | HEIGHT: 67 IN | WEIGHT: 293 LBS | BODY MASS INDEX: 45.99 KG/M2 | SYSTOLIC BLOOD PRESSURE: 118 MMHG | HEART RATE: 105 BPM | DIASTOLIC BLOOD PRESSURE: 86 MMHG

## 2023-05-01 DIAGNOSIS — R10.31 ACUTE RIGHT LOWER QUADRANT PAIN: ICD-10-CM

## 2023-05-01 DIAGNOSIS — N93.9 VAGINAL BLEEDING: ICD-10-CM

## 2023-05-01 DIAGNOSIS — R10.2 PELVIC CRAMPING: ICD-10-CM

## 2023-05-01 DIAGNOSIS — Z92.0 HISTORY OF USE OF CONTRACEPTIVE INTRAUTERINE DEVICE (IUD): ICD-10-CM

## 2023-05-01 DIAGNOSIS — R00.0 TACHYCARDIA: ICD-10-CM

## 2023-05-01 DIAGNOSIS — R10.31 RIGHT LOWER QUADRANT ABDOMINAL PAIN: ICD-10-CM

## 2023-05-01 LAB
ALBUMIN SERPL BCP-MCNC: 4.1 G/DL (ref 3.2–4.9)
ALBUMIN/GLOB SERPL: 1.3 G/DL
ALP SERPL-CCNC: 95 U/L (ref 30–99)
ALT SERPL-CCNC: 36 U/L (ref 2–50)
ANION GAP SERPL CALC-SCNC: 11 MMOL/L (ref 7–16)
APPEARANCE UR: CLEAR
AST SERPL-CCNC: 34 U/L (ref 12–45)
BASOPHILS # BLD AUTO: 0.4 % (ref 0–1.8)
BASOPHILS # BLD: 0.03 K/UL (ref 0–0.12)
BILIRUB SERPL-MCNC: 0.2 MG/DL (ref 0.1–1.5)
BILIRUB UR STRIP-MCNC: NEGATIVE MG/DL
BUN SERPL-MCNC: 11 MG/DL (ref 8–22)
CALCIUM ALBUM COR SERPL-MCNC: 9.2 MG/DL (ref 8.5–10.5)
CALCIUM SERPL-MCNC: 9.3 MG/DL (ref 8.5–10.5)
CHLORIDE SERPL-SCNC: 109 MMOL/L (ref 96–112)
CO2 SERPL-SCNC: 20 MMOL/L (ref 20–33)
COLOR UR AUTO: YELLOW
CREAT SERPL-MCNC: 0.55 MG/DL (ref 0.5–1.4)
EOSINOPHIL # BLD AUTO: 0.09 K/UL (ref 0–0.51)
EOSINOPHIL NFR BLD: 1.1 % (ref 0–6.9)
ERYTHROCYTE [DISTWIDTH] IN BLOOD BY AUTOMATED COUNT: 40.4 FL (ref 35.9–50)
GFR SERPLBLD CREATININE-BSD FMLA CKD-EPI: 126 ML/MIN/1.73 M 2
GLOBULIN SER CALC-MCNC: 3.1 G/DL (ref 1.9–3.5)
GLUCOSE SERPL-MCNC: 90 MG/DL (ref 65–99)
GLUCOSE UR STRIP.AUTO-MCNC: NEGATIVE MG/DL
HCG SERPL QL: NEGATIVE
HCT VFR BLD AUTO: 47.5 % (ref 37–47)
HGB BLD-MCNC: 16.4 G/DL (ref 12–16)
IMM GRANULOCYTES # BLD AUTO: 0.02 K/UL (ref 0–0.11)
IMM GRANULOCYTES NFR BLD AUTO: 0.3 % (ref 0–0.9)
KETONES UR STRIP.AUTO-MCNC: NEGATIVE MG/DL
LEUKOCYTE ESTERASE UR QL STRIP.AUTO: NEGATIVE
LYMPHOCYTES # BLD AUTO: 3.24 K/UL (ref 1–4.8)
LYMPHOCYTES NFR BLD: 41.2 % (ref 22–41)
MCH RBC QN AUTO: 30.4 PG (ref 27–33)
MCHC RBC AUTO-ENTMCNC: 34.5 G/DL (ref 33.6–35)
MCV RBC AUTO: 88 FL (ref 81.4–97.8)
MONOCYTES # BLD AUTO: 0.52 K/UL (ref 0–0.85)
MONOCYTES NFR BLD AUTO: 6.6 % (ref 0–13.4)
NEUTROPHILS # BLD AUTO: 3.96 K/UL (ref 2–7.15)
NEUTROPHILS NFR BLD: 50.4 % (ref 44–72)
NITRITE UR QL STRIP.AUTO: NEGATIVE
NRBC # BLD AUTO: 0 K/UL
NRBC BLD-RTO: 0 /100 WBC
PH UR STRIP.AUTO: 7.5 [PH] (ref 5–8)
PLATELET # BLD AUTO: 319 K/UL (ref 164–446)
PMV BLD AUTO: 10 FL (ref 9–12.9)
POCT INT CON NEG: NEGATIVE
POCT INT CON POS: POSITIVE
POCT URINE PREGNANCY TEST: NEGATIVE
POTASSIUM SERPL-SCNC: 4 MMOL/L (ref 3.6–5.5)
PROT SERPL-MCNC: 7.2 G/DL (ref 6–8.2)
PROT UR QL STRIP: NEGATIVE MG/DL
RBC # BLD AUTO: 5.4 M/UL (ref 4.2–5.4)
RBC UR QL AUTO: NORMAL
SODIUM SERPL-SCNC: 140 MMOL/L (ref 135–145)
SP GR UR STRIP.AUTO: 1.02
UROBILINOGEN UR STRIP-MCNC: 0.2 MG/DL
WBC # BLD AUTO: 7.9 K/UL (ref 4.8–10.8)

## 2023-05-01 PROCEDURE — 81002 URINALYSIS NONAUTO W/O SCOPE: CPT | Performed by: PHYSICIAN ASSISTANT

## 2023-05-01 PROCEDURE — 80053 COMPREHEN METABOLIC PANEL: CPT

## 2023-05-01 PROCEDURE — 85025 COMPLETE CBC W/AUTO DIFF WBC: CPT

## 2023-05-01 PROCEDURE — 81025 URINE PREGNANCY TEST: CPT | Performed by: PHYSICIAN ASSISTANT

## 2023-05-01 PROCEDURE — 99215 OFFICE O/P EST HI 40 MIN: CPT | Performed by: PHYSICIAN ASSISTANT

## 2023-05-01 PROCEDURE — 99284 EMERGENCY DEPT VISIT MOD MDM: CPT

## 2023-05-01 PROCEDURE — 700111 HCHG RX REV CODE 636 W/ 250 OVERRIDE (IP): Mod: UD | Performed by: EMERGENCY MEDICINE

## 2023-05-01 PROCEDURE — 96374 THER/PROPH/DIAG INJ IV PUSH: CPT

## 2023-05-01 PROCEDURE — 84703 CHORIONIC GONADOTROPIN ASSAY: CPT

## 2023-05-01 PROCEDURE — 36415 COLL VENOUS BLD VENIPUNCTURE: CPT

## 2023-05-01 RX ORDER — BREXPIPRAZOLE 1 MG/1
1 TABLET ORAL
COMMUNITY
Start: 2023-04-15 | End: 2024-02-14

## 2023-05-01 RX ADMIN — FENTANYL CITRATE 50 MCG: 50 INJECTION, SOLUTION INTRAMUSCULAR; INTRAVENOUS at 22:39

## 2023-05-01 ASSESSMENT — FIBROSIS 4 INDEX
FIB4 SCORE: 0.65
FIB4 SCORE: 0.65

## 2023-05-01 ASSESSMENT — PAIN SCALES - GENERAL: PAINLEVEL: 7=MODERATE-SEVERE PAIN

## 2023-05-02 ENCOUNTER — APPOINTMENT (OUTPATIENT)
Dept: RADIOLOGY | Facility: MEDICAL CENTER | Age: 28
End: 2023-05-02
Attending: EMERGENCY MEDICINE
Payer: MEDICAID

## 2023-05-02 ENCOUNTER — HOSPITAL ENCOUNTER (EMERGENCY)
Facility: MEDICAL CENTER | Age: 28
End: 2023-05-02
Attending: EMERGENCY MEDICINE
Payer: MEDICAID

## 2023-05-02 VITALS
WEIGHT: 293 LBS | SYSTOLIC BLOOD PRESSURE: 115 MMHG | RESPIRATION RATE: 16 BRPM | TEMPERATURE: 97.5 F | BODY MASS INDEX: 45.99 KG/M2 | DIASTOLIC BLOOD PRESSURE: 60 MMHG | OXYGEN SATURATION: 95 % | HEIGHT: 67 IN | HEART RATE: 70 BPM

## 2023-05-02 VITALS
HEART RATE: 83 BPM | DIASTOLIC BLOOD PRESSURE: 65 MMHG | TEMPERATURE: 98.5 F | RESPIRATION RATE: 18 BRPM | SYSTOLIC BLOOD PRESSURE: 129 MMHG | BODY MASS INDEX: 47.79 KG/M2 | OXYGEN SATURATION: 96 % | WEIGHT: 293 LBS

## 2023-05-02 DIAGNOSIS — R51.9 NONINTRACTABLE HEADACHE, UNSPECIFIED CHRONICITY PATTERN, UNSPECIFIED HEADACHE TYPE: ICD-10-CM

## 2023-05-02 LAB
APPEARANCE UR: CLEAR
BILIRUB UR QL STRIP.AUTO: NEGATIVE
COLOR UR: YELLOW
EKG IMPRESSION: NORMAL
GLUCOSE UR STRIP.AUTO-MCNC: NEGATIVE MG/DL
HCG SERPL QL: NEGATIVE
KETONES UR STRIP.AUTO-MCNC: NEGATIVE MG/DL
LEUKOCYTE ESTERASE UR QL STRIP.AUTO: NEGATIVE
MICRO URNS: NORMAL
NITRITE UR QL STRIP.AUTO: NEGATIVE
PH UR STRIP.AUTO: 7.5 [PH] (ref 5–8)
PROT UR QL STRIP: NEGATIVE MG/DL
RBC UR QL AUTO: NEGATIVE
SP GR UR STRIP.AUTO: 1.02
UROBILINOGEN UR STRIP.AUTO-MCNC: 0.2 MG/DL

## 2023-05-02 PROCEDURE — 36415 COLL VENOUS BLD VENIPUNCTURE: CPT

## 2023-05-02 PROCEDURE — 81003 URINALYSIS AUTO W/O SCOPE: CPT

## 2023-05-02 PROCEDURE — 93005 ELECTROCARDIOGRAM TRACING: CPT

## 2023-05-02 PROCEDURE — 700105 HCHG RX REV CODE 258: Mod: UD | Performed by: EMERGENCY MEDICINE

## 2023-05-02 PROCEDURE — 700117 HCHG RX CONTRAST REV CODE 255: Performed by: EMERGENCY MEDICINE

## 2023-05-02 PROCEDURE — 99284 EMERGENCY DEPT VISIT MOD MDM: CPT

## 2023-05-02 PROCEDURE — 96374 THER/PROPH/DIAG INJ IV PUSH: CPT | Mod: XU

## 2023-05-02 PROCEDURE — 96375 TX/PRO/DX INJ NEW DRUG ADDON: CPT

## 2023-05-02 PROCEDURE — 70498 CT ANGIOGRAPHY NECK: CPT

## 2023-05-02 PROCEDURE — 93005 ELECTROCARDIOGRAM TRACING: CPT | Performed by: EMERGENCY MEDICINE

## 2023-05-02 PROCEDURE — 74177 CT ABD & PELVIS W/CONTRAST: CPT

## 2023-05-02 PROCEDURE — 84703 CHORIONIC GONADOTROPIN ASSAY: CPT

## 2023-05-02 PROCEDURE — 70496 CT ANGIOGRAPHY HEAD: CPT

## 2023-05-02 PROCEDURE — 700111 HCHG RX REV CODE 636 W/ 250 OVERRIDE (IP): Mod: UD | Performed by: EMERGENCY MEDICINE

## 2023-05-02 PROCEDURE — 96375 TX/PRO/DX INJ NEW DRUG ADDON: CPT | Mod: XU

## 2023-05-02 RX ORDER — MORPHINE SULFATE 4 MG/ML
4 INJECTION INTRAVENOUS ONCE
Status: COMPLETED | OUTPATIENT
Start: 2023-05-02 | End: 2023-05-02

## 2023-05-02 RX ORDER — SODIUM CHLORIDE 9 MG/ML
1000 INJECTION, SOLUTION INTRAVENOUS ONCE
Status: COMPLETED | OUTPATIENT
Start: 2023-05-02 | End: 2023-05-02

## 2023-05-02 RX ORDER — ONDANSETRON 2 MG/ML
4 INJECTION INTRAMUSCULAR; INTRAVENOUS ONCE
Status: COMPLETED | OUTPATIENT
Start: 2023-05-02 | End: 2023-05-02

## 2023-05-02 RX ORDER — DIPHENHYDRAMINE HYDROCHLORIDE 50 MG/ML
25 INJECTION INTRAMUSCULAR; INTRAVENOUS ONCE
Status: COMPLETED | OUTPATIENT
Start: 2023-05-02 | End: 2023-05-02

## 2023-05-02 RX ORDER — IBUPROFEN 600 MG/1
600 TABLET ORAL EVERY 6 HOURS PRN
Qty: 30 TABLET | Refills: 0 | Status: SHIPPED | OUTPATIENT
Start: 2023-05-02 | End: 2023-05-23

## 2023-05-02 RX ORDER — KETOROLAC TROMETHAMINE 30 MG/ML
15 INJECTION, SOLUTION INTRAMUSCULAR; INTRAVENOUS ONCE
Status: COMPLETED | OUTPATIENT
Start: 2023-05-02 | End: 2023-05-02

## 2023-05-02 RX ORDER — PROCHLORPERAZINE EDISYLATE 5 MG/ML
10 INJECTION INTRAMUSCULAR; INTRAVENOUS ONCE
Status: COMPLETED | OUTPATIENT
Start: 2023-05-02 | End: 2023-05-02

## 2023-05-02 RX ADMIN — DIPHENHYDRAMINE HYDROCHLORIDE 25 MG: 50 INJECTION INTRAMUSCULAR; INTRAVENOUS at 12:08

## 2023-05-02 RX ADMIN — KETOROLAC TROMETHAMINE 15 MG: 30 INJECTION, SOLUTION INTRAMUSCULAR; INTRAVENOUS at 13:14

## 2023-05-02 RX ADMIN — PROCHLORPERAZINE EDISYLATE 10 MG: 5 INJECTION INTRAMUSCULAR; INTRAVENOUS at 12:08

## 2023-05-02 RX ADMIN — IOHEXOL 80 ML: 350 INJECTION, SOLUTION INTRAVENOUS at 13:00

## 2023-05-02 RX ADMIN — MORPHINE SULFATE 4 MG: 4 INJECTION INTRAVENOUS at 00:59

## 2023-05-02 RX ADMIN — ONDANSETRON HYDROCHLORIDE 4 MG: 2 SOLUTION INTRAMUSCULAR; INTRAVENOUS at 00:59

## 2023-05-02 RX ADMIN — SODIUM CHLORIDE 1000 ML: 9 INJECTION, SOLUTION INTRAVENOUS at 11:58

## 2023-05-02 RX ADMIN — IOHEXOL 100 ML: 350 INJECTION, SOLUTION INTRAVENOUS at 00:25

## 2023-05-02 ASSESSMENT — PAIN DESCRIPTION - PAIN TYPE
TYPE: ACUTE PAIN
TYPE: ACUTE PAIN

## 2023-05-02 ASSESSMENT — FIBROSIS 4 INDEX: FIB4 SCORE: 0.53

## 2023-05-02 NOTE — ED NOTES
Pt discharged to home w/ steady gait. IV discontinued and gauze placed, pt in possession of belongings. Pt provided discharge education and information pertaining to medications and follow up appointments. Pt received copy of discharge instructions and verbalized understanding. /65   Pulse 83   Temp 36.9 °C (98.5 °F) (Temporal)   Resp 18   Wt (!) 138 kg (305 lb 1.9 oz)   SpO2 96%   BMI 47.79 kg/m²

## 2023-05-02 NOTE — ED NOTES
Bedside report received from Steve MAHAN. Pt resting comfortably and aware of POC with call light available and in reach. Pt on RA. Pt reports no needs at this time. Appropriate equipment in room.

## 2023-05-02 NOTE — ED PROVIDER NOTES
"ED Provider Note    CHIEF COMPLAINT  Chief Complaint   Patient presents with    Headache     Pt woke up with excruciating head and neck pain.  She describes it as \"pain from back of head down the neck.\"         EXTERNAL RECORDS REVIEWED  Other -patient was seen in this emergency department yesterday for right lower quadrant abdominal pain and vaginal bleeding.  Labs and imaging were reassuring.  UA did not suggest infection.  She was discharged to follow-up as an outpatient after she received morphine and Zofran.    HPI/ROS  LIMITATION TO HISTORY   Select: : None  OUTSIDE HISTORIAN(S):  None    Vanessa Sanchez is a 28 y.o. female who presents with headache that started this morning at 8 AM.  She notes the pain starts in the back of her head and extends down the back of her neck but denies any fevers.  She had a similar headache 1 month ago and was seen at [facility] but the headache resolved until this morning.  She had a brief episode of tunnel vision but now denies any visual changes.  She denies any exogenous hormone use.  She is not anticoagulated.  Denies any weakness or numbness in her extremities.    PAST MEDICAL HISTORY   has a past medical history of Anxiety, Asthma, Attention deficit hyperactivity disorder (ADHD) (6/27/2019), Depression, Hypothyroidism (6/27/2019), Sleep apnea, and Snoring.    SURGICAL HISTORY   has a past surgical history that includes tonsillectomy and adenoidectomy (2013); other; remove intrauterine device (7/29/2019); dilation and curettage (N/A, 7/29/2019); and hemorrhoidectomy (12/8/2019).    FAMILY HISTORY  Family History   Problem Relation Age of Onset    Diabetes Mother     Stroke Mother     Other Mother         fibromyalgia    No Known Problems Father     Diabetes Maternal Grandmother     Hyperlipidemia Maternal Grandmother     No Known Problems Sister        SOCIAL HISTORY  Social History     Tobacco Use    Smoking status: Every Day     Packs/day: 0.50    "  Types: Cigarettes    Smokeless tobacco: Never    Tobacco comments:     pt has smoked for 10 years   Vaping Use    Vaping Use: Former   Substance and Sexual Activity    Alcohol use: Not Currently     Comment: rare    Drug use: Not Currently     Types: Marijuana, Inhaled    Sexual activity: Yes     Partners: Male     Birth control/protection: I.U.D.     Comment: engaged       CURRENT MEDICATIONS  Home Medications       Reviewed by Stacy Bridges R.N. (Registered Nurse) on 05/02/23 at 1031  Med List Status: Partial     Medication Last Dose Status   albuterol 108 (90 Base) MCG/ACT Aero Soln inhalation aerosol  Active   buPROPion (WELLBUTRIN XL) 300 MG XL tablet  Active   buPROPion (WELLBUTRIN XL) 300 MG XL tablet 5/1/2023 Active   Esketamine HCl, 56 MG Dose, (SPRAVATO, 56 MG DOSE,) 28 MG/DEVICE Solution Therapy Pack  Active   ibuprofen (MOTRIN) 600 MG Tab 5/2/2023 Active   levonorgestrel (MIRENA) 20 MCG/DAY IUD  Active   nicotine (NICODERM) 21 MG/24HR PATCH 24 HR  Active   prazosin (MINIPRESS) 2 MG Cap  Active   REXULTI 1 MG Tab  Active   Vilazodone HCl 40 MG Tab  Active   VRAYLAR 3 MG Cap  Active                    ALLERGIES  No Known Allergies    PHYSICAL EXAM  VITAL SIGNS: /85   Pulse 99   Temp 36.9 °C (98.4 °F) (Temporal)   Resp (!) 22   Wt (!) 138 kg (305 lb 1.9 oz)   SpO2 94%   BMI 47.79 kg/m²    Physical Exam  Vitals and nursing note reviewed.   Constitutional:       Appearance: She is well-developed.   HENT:      Head: Normocephalic and atraumatic.   Eyes:      Extraocular Movements: Extraocular movements intact.      Pupils: Pupils are equal, round, and reactive to light.   Cardiovascular:      Rate and Rhythm: Normal rate and regular rhythm.      Heart sounds: Normal heart sounds.   Pulmonary:      Effort: Pulmonary effort is normal.      Breath sounds: Normal breath sounds.   Musculoskeletal:         General: Normal range of motion.      Cervical back: Normal range of motion and neck supple.    Skin:     General: Skin is warm and dry.   Neurological:      Mental Status: She is alert and oriented to person, place, and time.      GCS: GCS eye subscore is 4. GCS verbal subscore is 5. GCS motor subscore is 6.      Cranial Nerves: No cranial nerve deficit, dysarthria or facial asymmetry.      Sensory: No sensory deficit.      Motor: No weakness.      Gait: Gait normal.      Comments: Normal and stable gait.   Psychiatric:         Mood and Affect: Mood normal.         Speech: Speech normal.         Behavior: Behavior normal.     DIAGNOSTIC STUDIES / PROCEDURES  LABS  Results for orders placed or performed during the hospital encounter of 23   BETA-HCG QUALITATIVE SERUM   Result Value Ref Range    Beta-Hcg Qualitative Serum Negative Negative   EKG (NOW)   Result Value Ref Range    Report       [facility] Emergency Dept.    Test Date:  2023  Pt Name:    LAMONT DAVID                Department: ER  MRN:        [id number]                      Room:  Gender:     Female                       Technician: [id number]  :        1995                   Requested By:ER TRIAGE PROTOCOL  Order #:    [id number]                    Reading MD:    Measurements  Intervals                                Axis  Rate:       82                           P:          59  NH:         150                          QRS:        33  QRSD:       87                           T:          48  QT:         344  QTc:        402    Interpretive Statements  Sinus rhythm  No previous ECG available for comparison       RADIOLOGY  I have independently interpreted the diagnostic imaging associated with this visit and am waiting the final reading from the radiologist.   My preliminary interpretation is as follows: No intracranial abnormality    Radiologist interpretation:   CT-CTA NECK WITH & W/O-POST PROCESSING   Final Result      CT angiogram of the neck within normal limits.      CT-CTA HEAD WITH & W/O-POST PROCESS  "  Final Result      1.  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 This is of doubtful clinical significance.   2.  No evidence of aneurysm or large vessel occlusion.        COURSE & MEDICAL DECISION MAKING    ED Observation Status? Yes; I am placing the patient in to an observation status due to a diagnostic uncertainty as well as therapeutic intensity. Patient placed in observation status at 111:26 AM, 5/2/2023.     Observation plan is as follows: Labs, imaging, medication, reevaluation    Upon Reevaluation, the patient's condition has: Improved; and will be discharged.    Patient discharged from ED Observation status at 1:11 PM (Time) 5/2/2023 (Date).     INITIAL ASSESSMENT, COURSE AND PLAN  Care Narrative: This is a 28-year-old female who is here with an acute onset headache that started this morning at 8 AM and is consistent with a headache that she experienced 1 month ago when she was seen at an outside facility.  She does describe it as thunderclap but it is not the worst headache she has ever had.  She is not anticoagulated and although she had an episode of \"tunnel vision\" vision her vision has now returned to normal.    Differential diagnosis includes, but is not limited to, subarachnoid hemorrhage, dural venous sinus thrombus, intracranial hemorrhage/SDH, neoplasm, migraine, tension headache increased intracranial pressure/idiopathic intracranial hypertension, vertebral/basilar artery dissection.    Arrives to triage hypertensive but afebrile with otherwise normal vital signs.  At the time of my evaluation her blood pressure had improved to normal without intervention.  She has a normal and nonfocal neurologic exam, is moving all 4 extremities equally with normal strength and denies any sensation changes.  Neck is supple without meningeal signs.  She denies any photophobia/phonophobia although does have some nausea without any vomiting.    Started on Benadryl " and Compazine as well as IV fluids and a pregnancy test was obtained which was negative.  UA does not suggest infection.    A CTA of the head/neck was obtained which did not reveal acute abnormality.  Incidentally noted a focal high-grade stenosis of the left intracranial vertebral artery which is hypoplastic and felt to be of doubtful clinical significance.  Was given a dose of Toradol.    Upon reevaluation, patient notes her headache has completely resolved and she is eager for discharge.  Low suspicion for dural venous sinus thrombus.  No obvious neoplasm.  Unlikely stroke.  No dissection.  Likely dehydration versus tension headache versus atypical migraine.  I have placed a referral to neurology on her behalf/the headache clinic.  She will follow-up with neurology and notes that she has an appointment with her primary care physician next week for recheck.  Discharged in good and stable condition with strict return precautions.    HYDRATION: Based on the patient's presentation of Other headache the patient was given IV fluids. IV Hydration was used because oral hydration was not adequate alone. Upon recheck following hydration, the patient was improved.    ADDITIONAL PROBLEM LIST  None    DISPOSITION AND DISCUSSIONS  I have discussed management of the patient with the following physicians and LEIGHANN's:  None    Discussion of management with other QHP or appropriate source(s): None     Escalation of care considered, and ultimately not performed:blood analysis and acute inpatient care management, however at this time, the patient is most appropriate for outpatient management    Barriers to care at this time, including but not limited to: Patient does not have established PCP.     Decision tools and prescription drugs considered including, but not limited to:  None .    FINAL DIAGNOSIS  1. Nonintractable headache, unspecified chronicity pattern, unspecified headache type      Electronically signed by: Americo Wakefield  M.D., 5/2/2023 11:26 AM

## 2023-05-02 NOTE — DISCHARGE INSTRUCTIONS
You were seen in the ER for headache.  Thankfully your imaging is reassuring and your headache resolved with IV pain medication.  It will be important that you follow-up with a neurologist as well as your primary care physician.  I placed a referral to neurology on your behalf.  You can take Tylenol/Motrin as directed on the bottle at home if you develop similar symptoms.  Make sure to drink at least 8 ounces of clear liquids every hour to maintain your hydration.  Return if you develop any new or worsening symptoms.

## 2023-05-02 NOTE — PROGRESS NOTES
Subjective:   Vanessa Sanchez is a 28 y.o. female who presents for Dysmenorrhea (Patient states is having horrible cramping, when having intercourse has cramping from IUD, has Mirena IUD, painful when sitting, in pain, started yesterday)  This is a pleasant 28-year-old female who presents with chief complaint of acute onset right lower quadrant pain and pelvic cramping which started yesterday.  She is concerned about IUD complication.  She has a history of IUD embedment that required surgical extraction 3 years ago.  She subsequently had another Mirena IUD placed approximately 1 to 2 years ago.  She rates the pain as a 7 on a scale 1-10.  She has had some vaginal bleeding since yesterday intermittently which is not common for her.  Her LMP was last week.  She does report some pain radiating to the right side of the back.  She does have a history of nephrolithiasis.  She reports mild nausea, no vomiting.  No vaginal discharge or odor.  No STI concerns.  Last BM today, no history of constipation.  Nonbloody.      Medications:  albuterol Aers  buPROPion  levonorgestrel  nicotine Pt24  prazosin Caps  Spravato (56 MG Dose) Sopk  Vilazodone HCl Tabs  Vraylar Caps    Allergies:             Patient has no known allergies.    Surgical History:         Past Surgical History:   Procedure Laterality Date    HEMORRHOIDECTOMY  12/8/2019    Procedure: HEMORRHOIDECTOMY;  Surgeon: Manolo Shoemaker M.D.;  Location: SURGERY [facility];  Service: General    UT REMOVE INTRAUTERINE DEVICE  7/29/2019    Procedure: REMOVAL, INTRAUTERINE DEVICE, HYSTEROSCOPY;  Surgeon: Jamil Rick M.D.;  Location: SURGERY SAME DAY [facility];  Service: Obstetrics    DILATION AND CURETTAGE N/A 7/29/2019    Procedure: DILATION AND CURETTAGE;  Surgeon: Jamil Rick M.D.;  Location: SURGERY SAME DAY [facility];  Service: Obstetrics    TONSILLECTOMY AND ADENOIDECTOMY  2013    OTHER      Turbinate reduction       Past Social Hx:  Vanessa  "Lavern Sanchez  reports that she has been smoking cigarettes. She has been smoking an average of .5 packs per day. She has never used smokeless tobacco. She reports current alcohol use. She reports current drug use. Drugs: Marijuana and Inhaled.     Past Family Hx:   Vanessa Sanchez family history includes Diabetes in her maternal grandmother and mother; Hyperlipidemia in her maternal grandmother; No Known Problems in her father and sister; Other in her mother; Stroke in her mother.       Problem list, medications, and allergies reviewed by myself today in Epic.     Objective:     /86 (BP Location: Left arm, Patient Position: Sitting, BP Cuff Size: Large adult)   Pulse (!) 105   Temp 36.7 °C (98 °F)   Resp 16   Ht 1.702 m (5' 7\")   Wt (!) 137 kg (303 lb)   SpO2 98%   BMI 47.46 kg/m²     Physical Exam  Vitals and nursing note reviewed.   Constitutional:       General: She is not in acute distress.     Appearance: Normal appearance. She is not ill-appearing, toxic-appearing or diaphoretic.   HENT:      Head: Normocephalic.      Right Ear: External ear normal.      Left Ear: External ear normal.      Nose: Nose normal.      Mouth/Throat:      Mouth: Mucous membranes are moist.   Eyes:      Extraocular Movements: Extraocular movements intact.      Conjunctiva/sclera: Conjunctivae normal.      Pupils: Pupils are equal, round, and reactive to light.   Cardiovascular:      Rate and Rhythm: Normal rate.      Pulses: Normal pulses.   Pulmonary:      Effort: Pulmonary effort is normal. No respiratory distress.   Abdominal:      Tenderness: There is abdominal tenderness in the right lower quadrant and suprapubic area. There is guarding. There is no right CVA tenderness or left CVA tenderness. Positive signs include McBurney's sign. Negative signs include العلي's sign.          Comments: There is guarding to the right lower quadrant.  Bowel sounds present.  Negative العلي's.  Questionably positive McBurney's.  " No CVA tenderness.  Mild suprapubic tenderness   Musculoskeletal:      Cervical back: Normal range of motion.   Skin:     General: Skin is warm.      Findings: No lesion or rash.   Neurological:      General: No focal deficit present.      Mental Status: She is alert and oriented to person, place, and time.   Psychiatric:         Thought Content: Thought content normal.         Judgment: Judgment normal.     Results for orders placed or performed in visit on 05/01/23   POCT PREGNANCY   Result Value Ref Range    POC Urine Pregnancy Test Negative     Internal Control Positive Positive     Internal Control Negative Negative    POCT Urinalysis   Result Value Ref Range    POC Color yellow Negative    POC Appearance clear Negative    POC Glucose negative Negative mg/dL    POC Bilirubin negative Negative mg/dL    POC Ketones negative Negative mg/dL    POC Specific Gravity 1.020 <1.005 - >1.030    POC Blood trace-intact Negative    POC Urine PH 7.5 5.0 - 8.0    POC Protein negative Negative mg/dL    POC Urobiligen 0.2 Negative (0.2) mg/dL    POC Nitrites negative Negative    POC Leukocyte Esterase negative Negative       Assessment/Plan:     Diagnosis and Associated Orders:     1. Pelvic cramping  - POCT PREGNANCY  - POCT Urinalysis    2. Acute right lower quadrant pain    3. Vaginal bleeding    4. History of use of contraceptive intrauterine device (IUD)    5. Tachycardia    Other orders  - REXULTI 1 MG Tab; Take 1 Tablet by mouth at bedtime.        Comments/MDM:  This is a pleasant 1-year-old female who presents with 1 day history of right lower quadrant pain, pelvic cramping, vaginal spotting, history of Mirena IUD currently with previous history of IUD embedment requiring surgical intervention.  Unfortunately at 9 PM tonight I cannot arrange for any imaging studies.  Differential diagnosis for right lower quadrant pain includes appendicitis, colitis, diverticulitis, inflammatory bowel disease, irritable bowel syndrome,  volvulus, pyelonephritis, nephrolithiasis.  In females this includes ectopic pregnancy, fibroids, ruptured ovarian cyst, ovarian mass, ovarian torsion, tubo-ovarian abscess, pelvic inflammatory disease, endometriosis.  Given history of IUD and IUD complication I feel this patient requires higher level of care.  Transfer center contacted.  Patient will transport via private vehicle with .    I personally reviewed prior external notes and test results pertinent to today's visit.  Red flags discussed as well as indications to present to the Emergency Department.  Supportive care, natural history, differential diagnoses, and indications for immediate follow-up discussed.  Patient expresses understanding and agrees to plan.  Patient denies any other questions or concerns.    Follow-up with the primary care physician for recheck, reevaluation, and consideration of further management.      Please note that this dictation was created using voice recognition software. I have made a reasonable attempt to correct obvious errors, but I expect that there are errors of grammar and possibly content that I did not discover before finalizing the note.    This note was electronically signed by Eryn Clemens PA-C

## 2023-05-02 NOTE — ED TRIAGE NOTES
"Chief Complaint   Patient presents with    Headache     Pt woke up with excruciating head and neck pain.  She describes it as \"pain from back of head down the neck.\"       PT seen here yesterday for abdominal pain, dischrged at 0300.  HA began at 0800.  Pt reports h/o \"thunderclap HA\" 1 month ago.  Was seen at [facility] and received rx for fiorcet, but never did fill rx.  CT scan negative for ICH at that visit.    Pt is anxious and weepy secondary to pain at time of traige.      Noted to have steep drop in BP from  to RN triage assessment.  No neuro deficit noted, pt walking with steady gait.      "

## 2023-05-02 NOTE — ED NOTES
Pt discharged to home. Pt was given follow up instructions and prescriptions. All lines removed prior to discharge. Pt verbalized understanding of all instructions for discharge and is ambulatory out of ED with steady gait. AOx4

## 2023-05-02 NOTE — ED PROVIDER NOTES
ER Provider Note    Scribed for Dr. Migue Abdi M.D. by Mikal Dennis. 5/1/2023  10:25 PM    Primary Care Provider: Julieth Rosario M.D.    CHIEF COMPLAINT  Chief Complaint   Patient presents with    Sent from Urgent Care    Abdominal Pain     RLQ pain. Hx of IUD imbedding, also having light vaginal bleeding.        EXTERNAL RECORDS REVIEWED  Care everywhere The patient was seen at [facility] for an ankle sprain in April sent over from Urgent Care and tested negative in urine analysis and negative for pregnancy.     HPI/ROS  LIMITATION TO HISTORY   Select: : None    OUTSIDE HISTORIAN(S):  None    Vanessa Sanchez is a 28 y.o. female who presents to the Emergency Department right lower abdominal pain onset prior to arrival. The patient reports the pain is 7/10, intermittent, and starts on her right side radiates to her back. She reports that she has vaginal discharge but her menstrual period ended last week so she is concerned about this abnormality. She reports she has a history of kidney stones. She has associated symptoms of vaginal discharge. No alleviating or exacerbating factors reported.    PAST MEDICAL HISTORY  Past Medical History:   Diagnosis Date    Anxiety     Asthma     Attention deficit hyperactivity disorder (ADHD) 6/27/2019    Depression     Hypothyroidism 6/27/2019    Sleep apnea     uses c pap    Snoring        SURGICAL HISTORY  Past Surgical History:   Procedure Laterality Date    HEMORRHOIDECTOMY  12/8/2019    Procedure: HEMORRHOIDECTOMY;  Surgeon: Manolo Shoemaker M.D.;  Location: SURGERY [facility];  Service: General    CO REMOVE INTRAUTERINE DEVICE  7/29/2019    Procedure: REMOVAL, INTRAUTERINE DEVICE, HYSTEROSCOPY;  Surgeon: Jamil Rick M.D.;  Location: SURGERY SAME DAY [facility];  Service: Obstetrics    DILATION AND CURETTAGE N/A 7/29/2019    Procedure: DILATION AND CURETTAGE;  Surgeon: Jamil Rick M.D.;  Location: SURGERY SAME DAY [facility];   "Service: Obstetrics    TONSILLECTOMY AND ADENOIDECTOMY  2013    OTHER      Turbinate reduction       FAMILY HISTORY  Family History   Problem Relation Age of Onset    Diabetes Mother     Stroke Mother     Other Mother         fibromyalgia    No Known Problems Father     Diabetes Maternal Grandmother     Hyperlipidemia Maternal Grandmother     No Known Problems Sister        SOCIAL HISTORY   reports that she has been smoking cigarettes. She has been smoking an average of .5 packs per day. She has never used smokeless tobacco. She reports that she does not currently use alcohol. She reports that she does not currently use drugs after having used the following drugs: Marijuana and Inhaled.    CURRENT MEDICATIONS  Previous Medications    ALBUTEROL 108 (90 BASE) MCG/ACT AERO SOLN INHALATION AEROSOL    Inhale 1 Puff as needed.    BUPROPION (WELLBUTRIN XL) 300 MG XL TABLET    Take 300 mg by mouth every morning.    BUPROPION (WELLBUTRIN XL) 300 MG XL TABLET    Take 300 mg by mouth every day.    ESKETAMINE HCL, 56 MG DOSE, (SPRAVATO, 56 MG DOSE,) 28 MG/DEVICE SOLUTION THERAPY PACK    Spray 2 spray into both nostrils twice a week    LEVONORGESTREL (MIRENA) 20 MCG/DAY IUD    by Intrauterine route.    NICOTINE (NICODERM) 21 MG/24HR PATCH 24 HR    Place 1 Patch on the skin every 24 hours.    PRAZOSIN (MINIPRESS) 2 MG CAP    Take 2 mg by mouth every evening.    REXULTI 1 MG TAB    Take 1 Tablet by mouth at bedtime.    VILAZODONE HCL 40 MG TAB    Take  by mouth.    VRAYLAR 3 MG CAP    Take 1 Capsule by mouth every morning. Take 1 capsule by mouth every morning       ALLERGIES  Patient has no known allergies.    PHYSICAL EXAM  BP (!) 152/103   Pulse 77   Temp 36.2 °C (97.2 °F) (Temporal)   Resp 16   Ht 1.702 m (5' 7\")   Wt (!) 137 kg (302 lb 11.1 oz)   SpO2 98%   BMI 47.41 kg/m²   Constitutional: Alert in no apparent distress.  HENT: No signs of trauma, Bilateral external ears normal, Nose normal.   Eyes: Pupils are equal " and reactive, Conjunctiva normal, Non-icteric.   Neck: Normal range of motion, No tenderness, Supple, No stridor.   Lymphatic: No lymphadenopathy noted.   Cardiovascular: Regular rate and rhythm, no murmurs.   Thorax & Lungs: Normal breath sounds, No respiratory distress, No wheezing, No chest tenderness.   Abdomen: Bowel sounds normal, Soft, No masses, No pulsatile masses. No peritoneal signs. Tenderness to palpations in right lower quadrant.  Skin: Warm, Dry, No erythema, No rash.   Back: No bony tenderness, No CVA tenderness.   Extremities: Intact distal pulses, No edema, No tenderness, No cyanosis.  Musculoskeletal: Good range of motion in all major joints. No tenderness to palpation or major deformities noted.   Neurologic: Alert , Normal motor function, Normal sensory function, No focal deficits noted.   Psychiatric: Affect normal, Judgment normal, Mood normal.     DIAGNOSTIC STUDIES & PROCEDURES    Labs:   Labs Reviewed   CBC WITH DIFFERENTIAL - Abnormal; Notable for the following components:       Result Value    Hemoglobin 16.4 (*)     Hematocrit 47.5 (*)     Lymphocytes 41.20 (*)     All other components within normal limits   COMP METABOLIC PANEL   HCG QUAL SERUM   URINALYSIS   CORRECTED CALCIUM   ESTIMATED GFR       All labs reviewed by me.    Radiology:   The attending Emergency Physician has independently interpreted the diagnostic imaging associated with this visit and is awaiting the final reading from the radiologist, which will be displayed below.    Preliminary interpretation is a follows: No obvious abscess in the abdomen.  Radiologist interpretation:      CT-ABDOMEN-PELVIS WITH   Final Result         1.  No acute intra-abdominal abnormality identified           COURSE & MEDICAL DECISION MAKING    ED Observation Status? Yes; I am placing the patient in to an observation status due to a diagnostic uncertainty as well as therapeutic intensity. Patient placed in observation status at 11:14 PM,  5/1/2023.     Observation plan is as follows: labs and imaging    Upon Reevaluation, the patient's condition has: Improved; and will be discharged.    Patient discharged from ED Observation status at 1:51 AM, 5/2/2023.    INITIAL ASSESSMENT AND PLAN  Care Narrative:       10:25 PM - Patient seen and evaluated at bedside. Discussed plan of care, including labs and imaging. Patient agrees to plan of care. Patient will be treated with fentaNYL 50 mcg for her symptoms. Ordered HCG Qual Serum, CMP, CBC w/ Diff, UA, and CT-Abdomen-Pelvis W/ to evaluate.    12:53 AM - Reevaluated patient at bedside. Ordered morphine 4 mg and Zofran 4 mg to treat for her symptoms.    1:50 AM - Patient was reevaluated at bedside. The patient informs me they feel improved following administration. I discussed the patient's diagnostic study results which were reassuring. I discussed plan for discharge and follow up as outlined below. The patient is stable for discharge at this time and will return for any new or worsening symptoms. Patient verbalizes understanding and support with my plan for discharge.     ADDITIONAL PROBLEM LIST AND DISPOSITION  Patient with abdominal pain.  This could represent appendicitis.  Kidney stone is possible.  It is not pelvic and therefore I do not believe this is torsion.  We will get imaging of the patient's abdomen and pelvis as well as evaluate for urine infection as well as LFT abnormalities and pancreatitis.    Patient is a normal CT.  The patient's IUD is in place.  The patient has no leukocytosis.  The patient is not pregnant.  There is no infection in the urine.  The patient is feeling improved.  Will discharge home with strict return precautions and follow-up.               DISPOSITION AND DISCUSSIONS  I have discussed management of the patient with the following physicians and LEIGHANN's: None    Discussion of management with other QHP or appropriate source(s): None     Escalation of care considered, and  ultimately not performed: acute inpatient care management, however at this time, the patient is most appropriate for outpatient management.    Barriers to care at this time, including but not limited to: Patient does not have established PCP.     Decision tools and prescription drugs considered including, but not limited to:  Motrin 600 MG .    The patient will return for new or worsening symptoms and is stable at the time of discharge.    The patient is referred to a primary physician for blood pressure management, diabetic screening, and for all other preventative health concerns.      DISPOSITION:  Patient will be discharged home in stable condition.    FOLLOW UP:  Julieth Rosario M.D.  [address] [facility] [address]  [phone]    In 2 days        OUTPATIENT MEDICATIONS:  Discharge Medication List as of 5/2/2023  2:00 AM        START taking these medications    Details   ibuprofen (MOTRIN) 600 MG Tab Take 1 Tablet by mouth every 6 hours as needed for Moderate Pain., Disp-30 Tablet, R-0, Normal             FINAL IMPRESSION   1. Right lower quadrant abdominal pain         Mikal GUY (Guilherme), am scribing for, and in the presence of, Migue Abdi M.D..    Electronically signed by: Mikal Dennis (Guilherme), 5/1/2023    IMigue M.D. personally performed the services described in this documentation, as scribed by Mikal Dennis in my presence, and it is both accurate and complete.    The note accurately reflects work and decisions made by me.  Migue Abdi M.D.  5/2/2023  5:29 AM

## 2023-05-02 NOTE — ED TRIAGE NOTES
"Vanessa Sanchez  Chief Complaint   Patient presents with    Sent from Urgent Care    Abdominal Pain     RLQ pain. Hx of IUD imbedding, also having light vaginal bleeding.        Pt placed in lobby and educated on triage process. Pt encouraged to alert staff for any changes.     Patient and staff wearing appropriate PPE    BP (!) 152/103   Pulse 77   Temp 36.2 °C (97.2 °F) (Temporal)   Resp 16   Ht 1.702 m (5' 7\")   Wt (!) 137 kg (302 lb 11.1 oz)   SpO2 98%   BMI 47.41 kg/m²     "

## 2023-05-23 ENCOUNTER — GYNECOLOGY VISIT (OUTPATIENT)
Dept: OBGYN | Facility: CLINIC | Age: 28
End: 2023-05-23
Payer: MEDICAID

## 2023-05-23 VITALS — DIASTOLIC BLOOD PRESSURE: 86 MMHG | BODY MASS INDEX: 48.02 KG/M2 | WEIGHT: 293 LBS | SYSTOLIC BLOOD PRESSURE: 120 MMHG

## 2023-05-23 DIAGNOSIS — Z30.432 ENCOUNTER FOR IUD REMOVAL: ICD-10-CM

## 2023-05-23 PROCEDURE — 3079F DIAST BP 80-89 MM HG: CPT | Performed by: NURSE PRACTITIONER

## 2023-05-23 PROCEDURE — 58301 REMOVE INTRAUTERINE DEVICE: CPT | Performed by: NURSE PRACTITIONER

## 2023-05-23 PROCEDURE — 3074F SYST BP LT 130 MM HG: CPT | Performed by: NURSE PRACTITIONER

## 2023-05-23 RX ORDER — NORGESTIMATE AND ETHINYL ESTRADIOL 7DAYSX3 LO
1 KIT ORAL DAILY
Qty: 28 TABLET | Refills: 12 | Status: SHIPPED | OUTPATIENT
Start: 2023-05-23 | End: 2023-06-09

## 2023-05-23 ASSESSMENT — FIBROSIS 4 INDEX: FIB4 SCORE: 0.53

## 2023-05-23 NOTE — PROGRESS NOTES
Pt here for IUD removal. Reports issues with Mirena ever since it was placed last March, that have just been more and more bothersome. She has been cramping and spotting irregularly ever since placement. She is not interested in having children and wants to get her tubes removed. She does have a history of heavy painful periods and is wondering how she can control this either with surgery or other method. She desires to use pills as bridge method until sterilization.      IUD strings grasped and device removed without incident intact      Reviewed quick start of pills, back up method and missed pills  Referral to GYN for sterilization consult  Reviewed warning s/sx with IUD removal  Ibuprofen 600-800mg for cramping after removal

## 2023-05-23 NOTE — PROGRESS NOTES
Patient here for GYN follow up for abdominal pain and vaginal bleeding  Last seen on: 5/26/2022  LMP 5/2/2023 irregular   Last pap smear 5/27/2022 WNL  Pt states still having severe cramping, yesterday heavy bleeding and a bruise pelvis on left side, nipple pain.   # [phone]

## 2023-05-23 NOTE — PROCEDURES
Mirena IUD grasped with ring forceps and removed without incident intact. Pt tolerated procedure well.

## 2023-06-27 ENCOUNTER — GYNECOLOGY VISIT (OUTPATIENT)
Dept: OBGYN | Facility: CLINIC | Age: 28
End: 2023-06-27
Payer: MEDICAID

## 2023-06-27 VITALS
SYSTOLIC BLOOD PRESSURE: 120 MMHG | HEIGHT: 66 IN | DIASTOLIC BLOOD PRESSURE: 78 MMHG | WEIGHT: 293 LBS | BODY MASS INDEX: 47.09 KG/M2

## 2023-06-27 DIAGNOSIS — N93.9 ABNORMAL UTERINE BLEEDING (AUB): ICD-10-CM

## 2023-06-27 PROCEDURE — 3078F DIAST BP <80 MM HG: CPT | Performed by: OBSTETRICS & GYNECOLOGY

## 2023-06-27 PROCEDURE — 99214 OFFICE O/P EST MOD 30 MIN: CPT | Performed by: OBSTETRICS & GYNECOLOGY

## 2023-06-27 PROCEDURE — 3074F SYST BP LT 130 MM HG: CPT | Performed by: OBSTETRICS & GYNECOLOGY

## 2023-06-27 ASSESSMENT — FIBROSIS 4 INDEX: FIB4 SCORE: 0.53

## 2023-06-27 NOTE — PROGRESS NOTES
Patient here for BT consultation.  Consent signed today.  LMP:6/27/23  Last pap: 5/27/22= negative  BCM: will start pills tomorrow  Phone number: [phone]  Pharmacy verified

## 2023-06-27 NOTE — PROGRESS NOTES
"GYN FOLLOW UP VISIT    CC:  Gynecologic Exam       HPI: Patient is a 28 y.o.  who presents desiring sterilization and also states she has been battling prolonged heavy menses for the past 10-15 years. She has tried various OCPs for many years, Nuva Ring, Nexplanon as well as progesterone IUDs twice, with bothersome side effects. Most recently has tried progesterone IUD with side effects of pelvic pain and bothersome dyspareunia. She continues to have cycles that are 35-65 days in length with at times heavy daily bleeding, soaking a tampon an hour.   She has been told in the past she may have had endometriosis but has never had surgery to evaluate for this.   She also is not interested in fertility due to her many mental health issues and requesting a hysterectomy.  She states she has no desire to bear children of her own. Also reports her mother and grandmother had to have hysterectomy due to similar bleeding problems.          ROS:   General: denies fever / chills  HEENT: denies sore throat:  CV: denies chest pain:  Repiratory: denies shortness of breath  GI: denies abdominal pain  : denies dysuria:    PFSH:  I personally reviewed the past medical and surgical histories.       PHYSICAL EXAMINATION:  Vital Signs:   Vitals:    23 1340   BP: 120/78   BP Location: Right arm   Patient Position: Sitting   BP Cuff Size: Large adult   Weight: (!) 300 lb   Height: 5' 6\"     Body mass index is 48.42 kg/m².    Gen: appears well, NAD  Exam deferred    22 - pap NILM,     ASSESSMENT AND PLAN:  28 y.o.  with AUB unable to be controlled medically through various therapies. She is also strongly against childbearing and would like a hysterectomy.     Will obtain pelvic US and labs to initiate workup of AUB.     RTC after labs complete for hysterectomy planning.       Time spent: 30 minutes    Kaia Mcneill M.D.   "

## 2023-07-24 ENCOUNTER — APPOINTMENT (OUTPATIENT)
Dept: RADIOLOGY | Facility: MEDICAL CENTER | Age: 28
End: 2023-07-24
Attending: OBSTETRICS & GYNECOLOGY
Payer: MEDICAID

## 2023-07-24 DIAGNOSIS — N93.9 ABNORMAL UTERINE BLEEDING (AUB): ICD-10-CM

## 2023-07-24 PROCEDURE — 76830 TRANSVAGINAL US NON-OB: CPT

## 2023-07-27 ENCOUNTER — TELEPHONE (OUTPATIENT)
Dept: OBGYN | Facility: CLINIC | Age: 28
End: 2023-07-27
Payer: MEDICAID

## 2023-07-27 NOTE — TELEPHONE ENCOUNTER
Pt called stating cramping, feeling light headed. Has an up coming appt with [facility] 8/11, recommended  to drink plenty of fluids, keep taking ibuprofen and if pain worsens head to ER.

## 2023-08-11 ENCOUNTER — OFFICE VISIT (OUTPATIENT)
Dept: OBGYN | Facility: CLINIC | Age: 28
End: 2023-08-11
Payer: MEDICAID

## 2023-08-11 VITALS — BODY MASS INDEX: 49.1 KG/M2 | WEIGHT: 293 LBS | DIASTOLIC BLOOD PRESSURE: 78 MMHG | SYSTOLIC BLOOD PRESSURE: 122 MMHG

## 2023-08-11 DIAGNOSIS — N93.9 ABNORMAL UTERINE BLEEDING (AUB): ICD-10-CM

## 2023-08-11 DIAGNOSIS — N94.6 DYSMENORRHEA: ICD-10-CM

## 2023-08-11 PROCEDURE — 99214 OFFICE O/P EST MOD 30 MIN: CPT | Performed by: OBSTETRICS & GYNECOLOGY

## 2023-08-11 PROCEDURE — 3074F SYST BP LT 130 MM HG: CPT | Performed by: OBSTETRICS & GYNECOLOGY

## 2023-08-11 PROCEDURE — 3078F DIAST BP <80 MM HG: CPT | Performed by: OBSTETRICS & GYNECOLOGY

## 2023-08-11 RX ORDER — LEVONORGESTREL / ETHINYL ESTRADIOL AND ETHINYL ESTRADIOL 150-30(84)
1 KIT ORAL DAILY
Qty: 91 TABLET | Refills: 0 | Status: ON HOLD | OUTPATIENT
Start: 2023-08-11 | End: 2023-09-28

## 2023-08-11 RX ORDER — MELOXICAM 7.5 MG/1
7.5 TABLET ORAL DAILY
Qty: 30 TABLET | Refills: 2 | Status: SHIPPED | OUTPATIENT
Start: 2023-08-11 | End: 2023-10-16

## 2023-08-11 ASSESSMENT — FIBROSIS 4 INDEX: FIB4 SCORE: 0.41

## 2023-08-11 NOTE — PROGRESS NOTES
GYN FOLLOW UP VISIT    CC:  Gynecologic Exam (FU on Labs and to schedule hysterectomy)       HPI: Patient is a 28 y.o.  who presents for follow up for AUB and dysmenorrhea not relieved by multiple medical therapies and IUD/implants.   She desires hysterectomy.        ROS:   General: denies fever / chills  HEENT: denies sore throat:  CV: denies chest pain:  Repiratory: denies shortness of breath  GI: denies abdominal pain  : denies dysuria:    PFSH:  I personally reviewed the past medical and surgical histories.       PHYSICAL EXAMINATION:  Vital Signs:   Vitals:    23 1124   BP: 122/78   BP Location: Right arm   Patient Position: Sitting   BP Cuff Size: Adult   Weight: (!) 304 lb 3.2 oz     Body mass index is 49.1 kg/m².    Gen: appears well, NAD  Abd: soft obese NT  : Normal vulva, vagina. Normal appearing urethra.   Cervix smooth pink no lesions, abnormal discharge or blood.   Bimanual exam uterus midline AV mobile little to no descent.      Latest Reference Range & Units 23 05:30   RBC 4.19 - 5.21 x10e6/uL 4.74 (E)   Hemoglobin 12.3 - 16.0 g/dL 14.6 (E)   Hematocrit 36.0 - 47.0 % 42.6 (E)   MCV 81.0 - 99.0 fL 90.1 (E)   MCH 28.0 - 33.8 pg 30.8 (E)   MCHC 33.1 - 36.5 g/dL 34.2 (E)   RDW 11.8 - 14.0 % 13.0 (E)   Platelet Count 146 - 390 x10E3/uL 263 (E)   (E): External lab result   Latest Reference Range & Units 23 05:30   Sodium 136 - 144 mmol/L 140 (E)   Potassium 3.6 - 5.1 mmol/L 4.2 (E)   Chloride 102 - 110 mmol/L 113 (H) (E)   Co2 22 - 32 mmol/L 22 (E)   Anion Gap 2 - 11 mmol/L 5 (E)   Glucose 60 - 99 mg/dL 95 (E)   Bun 8 - 20 mg/dL 11 (E)   Creatinine 0.60 - 1.10 mg/dL 0.72 (E)   (H): Data is abnormally high  (E): External lab result     Latest Reference Range & Units 21 07:59   TSH 0.380 - 5.330 uIU/mL 4.570     Pelvic US    2023 2:36 PM     HISTORY/REASON FOR EXAM:  Vaginal Bleeding; Hx prolonged menses, heavy menses x last 15 years. reported history of  endometriosis        TECHNIQUE/EXAM DESCRIPTION:  Transabdominal and transvaginal pelvic ultrasound.     COMPARISON:   None     FINDINGS:  Both transabdominal and transvaginal scanning were performed to optimally visualize the pelvis.     UTERUS:  The uterus measures 4.10 cm x 8.37 cm x 5.22 cm.  The uterine myometrium is within normal limits.  The endometrial echo complex measures 1.41 cm.     OVARIES:  The right ovary measures 2.91 cm x 1.45 cm x 2.13 cm. Duplex Doppler examination of the right ovary shows normal waveforms.     The left ovary measures 2.42 cm x 1.54 cm x 3.27 cm. Duplex Doppler examination of the left ovary shows normal waveforms.     No large ovarian cysts or adnexal masses identified.     There is no free fluid seen.     IMPRESSION:     1.  The endometrial stripe measures 14.1 mm in thickness.     2.   No large ovarian cysts or adnexal masses.      ASSESSMENT AND PLAN:  28 y.o.  with AUB and dysmenorrhea not relieved by medical therapies, implants, IUDs. She is on an OCP currently but still having moderate to severe pain in pelvis and prolonged heavy bleeding. She would like a hysterectomy.  She also is not interested in fertility due to her many mental health issues and requesting a hysterectomy.  She states she has no desire to bear children of her own. Also reports her mother and grandmother had to have hysterectomy due to similar bleeding problems.    Patient requesting to have her OCPs changed to different dose in order to have some relief of her bleeding and pain. Also stated Mobic was prescribed by ER at one point which is the only NSAID that helped somewhat with her pain.     Discussed routes and types of hysterectomy. Recommended total laparoscopic hysterectomy and bilateral salpingectomy to reduce risks of future tubal disease. We will leave ovaries in place, to which patient agrees.     Risks of surgery discussed with patient, including, but not limited to, bleeding, infection,  damage to other organs such as bowel, bladder, ureters, and nerves, need for reoperation, need for blood transfusion if indicated. Patient understands all risks and all questions answered.     Pre and postop course/recovery discussed.     RTC for preop.     Orders Placed This Encounter    Levonorgest-Eth Estrad 91-Day 0.15-0.03 &0.01 MG Tab    meloxicam (MOBIC) 7.5 MG Tab        Time spent: 30 minutes    Kaia Mcneill M.D.

## 2023-08-11 NOTE — Clinical Note
Plan: Total Laparoscopic Hysterectomy, bilateral salpingectomy, cystoscopy.  Assist: Yes (preferrably Ambriz or Joss) Duration: 2 hours Outpatient stay < 24 hours.  PCP clearance needed.  Needs to return for preop.

## 2023-08-11 NOTE — PROGRESS NOTES
Patient is her for a GYN Visit to FU on her labs and also to schedule the hysterectomy. Her weight is 304.2 lb and BP is 122/78. Her LMP was 07/15/2023 and Last Pap was on 05/26/22. She is having heavy and painful periods. She is passing big size clots.

## 2023-08-28 ENCOUNTER — GYNECOLOGY VISIT (OUTPATIENT)
Dept: OBGYN | Facility: CLINIC | Age: 28
End: 2023-08-28
Payer: MEDICAID

## 2023-08-28 VITALS — DIASTOLIC BLOOD PRESSURE: 73 MMHG | BODY MASS INDEX: 49.42 KG/M2 | WEIGHT: 293 LBS | SYSTOLIC BLOOD PRESSURE: 112 MMHG

## 2023-08-28 DIAGNOSIS — N93.9 ABNORMAL UTERINE BLEEDING (AUB): ICD-10-CM

## 2023-08-28 DIAGNOSIS — N94.6 DYSMENORRHEA: ICD-10-CM

## 2023-08-28 DIAGNOSIS — Z01.818 PREOPERATIVE EXAM FOR GYNECOLOGIC SURGERY: ICD-10-CM

## 2023-08-28 PROCEDURE — 3074F SYST BP LT 130 MM HG: CPT | Performed by: OBSTETRICS & GYNECOLOGY

## 2023-08-28 PROCEDURE — 99214 OFFICE O/P EST MOD 30 MIN: CPT | Performed by: OBSTETRICS & GYNECOLOGY

## 2023-08-28 PROCEDURE — 3078F DIAST BP <80 MM HG: CPT | Performed by: OBSTETRICS & GYNECOLOGY

## 2023-08-28 RX ORDER — DIAZEPAM 5 MG/1
5 TABLET ORAL EVERY 6 HOURS PRN
COMMUNITY

## 2023-08-28 ASSESSMENT — FIBROSIS 4 INDEX: FIB4 SCORE: 0.41

## 2023-08-28 NOTE — PROGRESS NOTES
History and Physical    Vanessa Sanchez    28 y.o.    Chief complaint    Chief Complaint   Patient presents with    Pre-op Exam       HPI    Patient is a 27 yo G0 who presents for preoperative preparation for her hysterectomy in the setting of AUB, dysmenorrhea and undesired fertility.     Patient has been battling prolonged heavy menses for the past 10-15 years. She has tried various OCPs for many years, Nuva Ring, Nexplanon as well as progesterone IUDs twice, with bothersome side effects. Most recently has tried progesterone IUD with side effects of pelvic pain and bothersome dyspareunia. Had to have the IUD removed a few months ago. She continues to have cycles that are 35-65 days in length with at times heavy daily bleeding, soaking a tampon an hour.   She has been told in the past she may have had endometriosis but has never had surgery to evaluate for this.   She also is not interested in fertility due to her many mental health issues and requesting a hysterectomy.  She states she has no desire to bear children of her own. Also reports her mother and grandmother had to have hysterectomy due to similar bleeding problems.     She is actively trying to quit tobacco prior to her surgery. Discussed importance of tobacco cessation on surgical outcomes/recovery.    She also has had clearance for surgery in terms of her sleep apnea.      Risks of surgery discussed with patient, including, but not limited to, bleeding, infection, damage to other organs such as bowel, bladder, ureters, and nerves, need for reoperation, need for blood transfusion if indicated. Patient understands all risks and all questions answered. Consents to be signed.     Review of Systems:  Review of Systems   All other systems reviewed and are negative.       Past Obstetrical History:    G0    Past Gynecological History:    Last pap: 2022  H/o abnormal pap: No  H/o STIs: No  DEXA: N/A  Last Mammogram: N/A  LMP: Patient's last menstrual period  was 08/21/2023.  BCM: OCP    Past Medical History    Past Medical History:   Diagnosis Date    Anxiety     Asthma     Attention deficit hyperactivity disorder (ADHD) 6/27/2019    Depression     Hypothyroidism 6/27/2019    Sleep apnea     uses c pap    Snoring        Past Surgical History    Past Surgical History:   Procedure Laterality Date    HEMORRHOIDECTOMY  12/8/2019    Procedure: HEMORRHOIDECTOMY;  Surgeon: Manolo Shoemaker M.D.;  Location: SURGERY [facility] ORS;  Service: General    MD REMOVE INTRAUTERINE DEVICE  7/29/2019    Procedure: REMOVAL, INTRAUTERINE DEVICE, HYSTEROSCOPY;  Surgeon: Jamil Rick M.D.;  Location: SURGERY SAME DAY [facility] ORS;  Service: Obstetrics    DILATION AND CURETTAGE N/A 7/29/2019    Procedure: DILATION AND CURETTAGE;  Surgeon: Jamil Rick M.D.;  Location: SURGERY SAME DAY [facility] ORS;  Service: Obstetrics    TONSILLECTOMY AND ADENOIDECTOMY  2013    OTHER      Turbinate reduction       Family History   Problem Relation Age of Onset    Diabetes Mother     Stroke Mother     Other Mother         fibromyalgia    No Known Problems Father     Diabetes Maternal Grandmother     Hyperlipidemia Maternal Grandmother     No Known Problems Sister        Allergies    No Known Allergies    Medications    Current Outpatient Medications   Medication Sig Dispense Refill    diazePAM (VALIUM) 5 MG Tab Take 5 mg by mouth every 6 hours as needed for Anxiety.      Esketamine HCl (SPRAVATO, 84 MG DOSE, NA) Administer  into affected nostril(S).      Levonorgest-Eth Estrad 91-Day 0.15-0.03 &0.01 MG Tab Take 1 Tablet by mouth every day. 91 Tablet 0    meloxicam (MOBIC) 7.5 MG Tab Take 1 Tablet by mouth every day. 30 Tablet 2    REXULTI 1 MG Tab Take 1 Tablet by mouth at bedtime.      Vilazodone HCl 40 MG Tab Take  by mouth.      prazosin (MINIPRESS) 2 MG Cap Take 2 mg by mouth every evening.      albuterol 108 (90 Base) MCG/ACT Aero Soln inhalation aerosol Inhale 1 Puff as needed. 8.5 g 3     buPROPion (WELLBUTRIN XL) 300 MG XL tablet Take 450 mg by mouth every morning.      buPROPion (WELLBUTRIN XL) 300 MG XL tablet Take 300 mg by mouth every day. (Patient not taking: Reported on 2023)       No current facility-administered medications for this visit.       Social  Social History     Tobacco Use    Smoking status: Every Day     Current packs/day: 0.50     Types: Cigarettes    Smokeless tobacco: Never    Tobacco comments:     pt has smoked for 10 years   Vaping Use    Vaping Use: Former   Substance Use Topics    Alcohol use: Not Currently     Comment: rare    Drug use: Not Currently     Types: Marijuana, Inhaled        OBJECTIVE:    Vitals    /73 (BP Location: Right arm, Patient Position: Sitting, BP Cuff Size: Adult)   Wt (!) 306 lb 3.2 oz   LMP 2023   BMI 49.42 kg/m²     Physical Exam    GENERAL: Well developed, well nourished, female in no acute distress.    HEENT: NCAT, mucus membranes moist    Neck: Supple, nontender, no JEYSON, no thyromegaly    CV: RRR    Pulm: CTAB    Abdomen: Soft obese ND NT.    Ext: VYAS    : Deferred    Labs/Pathology:     Latest Reference Range & Units 23 05:30   RBC 4.19 - 5.21 x10e6/uL 4.74 (E)   Hemoglobin 12.3 - 16.0 g/dL 14.6 (E)   Hematocrit 36.0 - 47.0 % 42.6 (E)   Platelet Count 146 - 390 x10E3/uL 263 (E)   (E): External lab result   Latest Reference Range & Units 21 07:59   TSH 0.380 - 5.330 uIU/mL 4.570     Imagin2023 2:36 PM     HISTORY/REASON FOR EXAM:  Vaginal Bleeding; Hx prolonged menses, heavy menses x last 15 years. reported history of endometriosis        TECHNIQUE/EXAM DESCRIPTION:  Transabdominal and transvaginal pelvic ultrasound.     COMPARISON:   None     FINDINGS:  Both transabdominal and transvaginal scanning were performed to optimally visualize the pelvis.     UTERUS:  The uterus measures 4.10 cm x 8.37 cm x 5.22 cm.  The uterine myometrium is within normal limits.  The endometrial echo complex measures 1.41  cm.     OVARIES:  The right ovary measures 2.91 cm x 1.45 cm x 2.13 cm. Duplex Doppler examination of the right ovary shows normal waveforms.     The left ovary measures 2.42 cm x 1.54 cm x 3.27 cm. Duplex Doppler examination of the left ovary shows normal waveforms.     No large ovarian cysts or adnexal masses identified.     There is no free fluid seen.     IMPRESSION:     1.  The endometrial stripe measures 14.1 mm in thickness.     2.   No large ovarian cysts or adnexal masses.      A/P    Patient Active Problem List   Diagnosis    Borderline personality disorder (HCC)    Patellofemoral syndrome of right knee    Asthma    Morbid obesity with BMI of 40.0-44.9, adult (HCC)    Anxiety    Vegan    Severe episode of recurrent major depressive disorder, without psychotic features (HCC)    Tobacco use disorder    Elevated BP without diagnosis of hypertension    History of anxiety    History of borderline personality disorder    Marijuana use    MDD (major depressive disorder)    Suicidal ideations    Cigarette nicotine dependence without complication    Internal derangement of right knee       Vanessa Sanchez    28 y.o.  with AUB, dysmenorrhea, unable to be medically managed, desiring sterilization.     1. Preoperative exam for gynecologic surgery    2. Dysmenorrhea    3. Abnormal uterine bleeding (AUB)      Risks, benefits, alternatives discussed with patient in detail.   All questions answered. Pre and postop course reviewed.   Preop labs to be ordered.   NPO 8h prior to OR.   Notify anesthesia.    Preop antibiotics - ancef 2g IV prior to OR.   Instructed to Stop NSAIDS (Mobic) 7 days prior to OR.   Continue tobacco cessation.     To OR for total laparoscopic hysterectomy, bilateral salpingectomy, cystoscopy.       Time spent: 30 minutes        Kaia Mcneill M.D.    Obstetrics and Gynecology    :14 PM

## 2023-08-28 NOTE — PROGRESS NOTES
Patient is her for her Pre-Op. She is having surgery on 069/27. Her weight is 306.2 lb and BP is 112/73. Her LMP is 08/21/23 and Last Pap was done on 05/26/2022. She has a list of questions to ask you. She has signed the Hysterectomy Acknowledgment form.

## 2023-08-29 ENCOUNTER — APPOINTMENT (OUTPATIENT)
Dept: ADMISSIONS | Facility: MEDICAL CENTER | Age: 28
End: 2023-08-29
Attending: OBSTETRICS & GYNECOLOGY
Payer: MEDICAID

## 2023-09-13 ENCOUNTER — PRE-ADMISSION TESTING (OUTPATIENT)
Dept: ADMISSIONS | Facility: MEDICAL CENTER | Age: 28
End: 2023-09-13
Attending: OBSTETRICS & GYNECOLOGY
Payer: MEDICAID

## 2023-09-13 NOTE — OR NURSING
Pt wears CPAP. Pt's BMI is 49. Surgical Clearance strongly recommended, and faxed to Dr. Mcneill's office.

## 2023-09-14 ENCOUNTER — OFFICE VISIT (OUTPATIENT)
Dept: URGENT CARE | Facility: CLINIC | Age: 28
End: 2023-09-14
Payer: MEDICAID

## 2023-09-14 VITALS
HEIGHT: 67 IN | HEART RATE: 89 BPM | RESPIRATION RATE: 18 BRPM | TEMPERATURE: 97.9 F | DIASTOLIC BLOOD PRESSURE: 78 MMHG | SYSTOLIC BLOOD PRESSURE: 122 MMHG | BODY MASS INDEX: 45.99 KG/M2 | OXYGEN SATURATION: 97 % | WEIGHT: 293 LBS

## 2023-09-14 DIAGNOSIS — R19.7 DIARRHEA, UNSPECIFIED TYPE: ICD-10-CM

## 2023-09-14 PROCEDURE — 3074F SYST BP LT 130 MM HG: CPT | Performed by: PHYSICIAN ASSISTANT

## 2023-09-14 PROCEDURE — 3078F DIAST BP <80 MM HG: CPT | Performed by: PHYSICIAN ASSISTANT

## 2023-09-14 PROCEDURE — 99214 OFFICE O/P EST MOD 30 MIN: CPT | Performed by: PHYSICIAN ASSISTANT

## 2023-09-14 RX ORDER — DICYCLOMINE HCL 20 MG
20 TABLET ORAL 4 TIMES DAILY PRN
Qty: 60 TABLET | Refills: 0 | Status: SHIPPED | OUTPATIENT
Start: 2023-09-14

## 2023-09-14 ASSESSMENT — ENCOUNTER SYMPTOMS
FEVER: 1
HEADACHES: 0
MYALGIAS: 0
DIAPHORESIS: 0
BLOOD IN STOOL: 0
CHILLS: 0
NAUSEA: 0
CONSTIPATION: 0
SORE THROAT: 0
VOMITING: 0
FLANK PAIN: 0
ABDOMINAL PAIN: 0
PALPITATIONS: 0
DIARRHEA: 1
DIZZINESS: 0
WEAKNESS: 0
COUGH: 0
SHORTNESS OF BREATH: 0

## 2023-09-14 ASSESSMENT — FIBROSIS 4 INDEX: FIB4 SCORE: 0.41

## 2023-09-15 ENCOUNTER — HOSPITAL ENCOUNTER (OUTPATIENT)
Facility: MEDICAL CENTER | Age: 28
End: 2023-09-15
Attending: PHYSICIAN ASSISTANT
Payer: MEDICAID

## 2023-09-15 PROCEDURE — 87045 FECES CULTURE AEROBIC BACT: CPT

## 2023-09-15 PROCEDURE — 87899 AGENT NOS ASSAY W/OPTIC: CPT

## 2023-09-15 PROCEDURE — 87046 STOOL CULTR AEROBIC BACT EA: CPT

## 2023-09-15 NOTE — PROGRESS NOTES
Subjective:     CHIEF COMPLAINT  Chief Complaint   Patient presents with    Diarrhea     X 8 days, headache, diarrhea, fever, sweating        HPI  Vanessa Sanchez is a very pleasant 28 y.o. female who presents to the clinic with persistent diarrhea x8 days.  Patient describes 10+ episodes of loose stool daily.  No blood or mucus in the stool.  Believes she was running a low-grade fever yesterday.  Denies any associated nausea or vomiting.  Describes lower abdominal cramping and churning.  No prior abdominal surgery.  Has previously been diagnosed with IBS after having colonoscopy and endoscopy performed.  No recent antibiotic use.  No recent travel.    REVIEW OF SYSTEMS  Review of Systems   Constitutional:  Positive for fever (yesterday). Negative for chills, diaphoresis and malaise/fatigue.   HENT:  Negative for congestion and sore throat.    Respiratory:  Negative for cough and shortness of breath.    Cardiovascular:  Negative for chest pain and palpitations.   Gastrointestinal:  Positive for diarrhea. Negative for abdominal pain, blood in stool, constipation, melena, nausea and vomiting.   Genitourinary:  Negative for dysuria, flank pain, frequency, hematuria and urgency.   Musculoskeletal:  Negative for myalgias.   Skin:  Negative for rash.   Neurological:  Negative for dizziness, weakness and headaches.       PAST MEDICAL HISTORY  Patient Active Problem List    Diagnosis Date Noted    History of anxiety 07/23/2022    History of borderline personality disorder 07/23/2022    Marijuana use 07/23/2022    MDD (major depressive disorder) 07/23/2022    Suicidal ideations 07/23/2022    Cigarette nicotine dependence without complication 07/23/2022    Severe episode of recurrent major depressive disorder, without psychotic features ([facility]) 04/18/2022    Tobacco use disorder 04/18/2022    Elevated BP without diagnosis of hypertension 04/18/2022    Morbid obesity with BMI of 40.0-44.9, adult (HCC) 09/23/2021    Anxiety  2021    Vegan 2021    Patellofemoral syndrome of right knee 2021    Asthma 2021    Internal derangement of right knee 2021    Borderline personality disorder (HCC) 2019       SURGICAL HISTORY   has a past surgical history that includes tonsillectomy and adenoidectomy (); other; remove intrauterine device (2019); dilation and curettage (N/A, 2019); and hemorrhoidectomy (2019).    ALLERGIES  No Known Allergies    CURRENT MEDICATIONS  Home Medications       Reviewed by Rob Hernandez P.A.-C. (Physician Assistant) on 23 at   Med List Status: <None>     Medication Last Dose Status   albuterol 108 (90 Base) MCG/ACT Aero Soln inhalation aerosol PRN Active   buPROPion (WELLBUTRIN XL) 300 MG XL tablet Taking Active   Cyanocobalamin (VITAMIN B-12 PO) Taking Active   diazePAM (VALIUM) 5 MG Tab PRN Active   Esketamine HCl (SPRAVATO, 84 MG DOSE, NA) Taking Active   Levonorgest-Eth Estrad -Day 0.15-0.03 &0.01 MG Tab Taking Active   meloxicam (MOBIC) 7.5 MG Tab  Active   prazosin (MINIPRESS) 2 MG Cap Taking Active   REXULTI 1 MG Tab Taking Active   Vilazodone HCl 40 MG Tab Not Taking Active                    SOCIAL HISTORY  Social History     Tobacco Use    Smoking status: Former     Current packs/day: 0.00     Types: Cigarettes     Quit date: 2023     Years since quittin.0    Smokeless tobacco: Never    Tobacco comments:     pt has smoked for 10 years   Vaping Use    Vaping Use: Former   Substance and Sexual Activity    Alcohol use: Not Currently     Comment: rare    Drug use: Not Currently     Types: Inhaled    Sexual activity: Yes     Partners: Male     Birth control/protection: I.U.D.     Comment: engaged       FAMILY HISTORY  Family History   Problem Relation Age of Onset    Diabetes Mother     Stroke Mother     Other Mother         fibromyalgia    No Known Problems Father     Diabetes Maternal Grandmother     Hyperlipidemia Maternal Grandmother      "No Known Problems Sister           Objective:     VITAL SIGNS: /78   Pulse 89   Temp 36.6 °C (97.9 °F) (Temporal)   Resp 18   Ht 1.702 m (5' 7\")   Wt (!) 139 kg (306 lb)   LMP 09/06/2023 (Approximate)   SpO2 97%   BMI 47.93 kg/m²     PHYSICAL EXAM  Physical Exam  Constitutional:       General: She is not in acute distress.     Appearance: Normal appearance. She is not ill-appearing, toxic-appearing or diaphoretic.   HENT:      Head: Normocephalic and atraumatic.      Mouth/Throat:      Mouth: Mucous membranes are moist.   Eyes:      Conjunctiva/sclera: Conjunctivae normal.   Cardiovascular:      Rate and Rhythm: Normal rate and regular rhythm.      Pulses: Normal pulses.      Heart sounds: Normal heart sounds.   Pulmonary:      Effort: Pulmonary effort is normal.      Breath sounds: Normal breath sounds. No wheezing.   Abdominal:      General: Bowel sounds are normal. There is no distension.      Palpations: Abdomen is soft. There is no mass.      Tenderness: There is no abdominal tenderness. There is no right CVA tenderness, left CVA tenderness, guarding or rebound.      Hernia: No hernia is present.   Musculoskeletal:         General: Normal range of motion.      Cervical back: Normal range of motion. No muscular tenderness.   Skin:     General: Skin is warm and dry.      Capillary Refill: Capillary refill takes less than 2 seconds.   Neurological:      General: No focal deficit present.      Mental Status: She is alert and oriented to person, place, and time. Mental status is at baseline.   Psychiatric:         Mood and Affect: Mood normal.         Thought Content: Thought content normal.         Assessment/Plan:     1. Diarrhea, unspecified type  - dicyclomine (BENTYL) 20 MG Tab; Take 1 Tablet by mouth 4 times a day as needed (Abdominal cramping and diarrhea).  Dispense: 60 Tablet; Refill: 0  - CULTURE STOOL; Future  - C Diff by PCR rflx Toxin; Future      MDM/Comments:    Patient has been dealing " with persistent diarrhea x8 days.  Describes 10+ episodes of loose stool daily.  No blood or mucus in the stool.  No associated abdominal pain, nausea or vomiting.  Believes she was running a fever yesterday.  No recent travel out of the country.  No recent antibiotic use.  She has been taking Imodium with minimal relief.  History of IBS.  We will send out for stool culture and C. difficile testing.  Trial of Bentyl discussed.  [place] diet discussed.    Differential diagnosis, natural history, supportive care, and indications for immediate follow-up discussed. All questions answered. Patient agrees with the plan of care.    Follow-up as needed if symptoms worsen or fail to improve to PCP, Urgent care or Emergency Room.    I have personally reviewed prior external notes and test results pertinent to today's visit.  I have independently reviewed and interpreted all diagnostics ordered during this urgent care acute visit.   Discussed management options (risks,benefits, and alternatives to treatment). Pt expresses understanding and the treatment plan was agreed upon. Questions were encouraged and answered to pt's satisfaction.    Please note that this dictation was created using voice recognition software. I have made a reasonable attempt to correct obvious errors, but I expect that there are errors of grammar and possibly content that I did not discover before finalizing the note.

## 2023-09-16 LAB
E COLI SXT1+2 STL IA: NORMAL
SIGNIFICANT IND 70042: NORMAL
SITE SITE: NORMAL
SOURCE SOURCE: NORMAL

## 2023-09-18 LAB
BACTERIA STL CULT: NORMAL
E COLI SXT1+2 STL IA: NORMAL
SIGNIFICANT IND 70042: NORMAL
SITE SITE: NORMAL
SOURCE SOURCE: NORMAL

## 2023-09-26 ENCOUNTER — PRE-ADMISSION TESTING (OUTPATIENT)
Dept: ADMISSIONS | Facility: MEDICAL CENTER | Age: 28
End: 2023-09-26
Attending: OBSTETRICS & GYNECOLOGY
Payer: MEDICAID

## 2023-09-26 ENCOUNTER — ANESTHESIA EVENT (OUTPATIENT)
Dept: SURGERY | Facility: MEDICAL CENTER | Age: 28
End: 2023-09-26
Payer: MEDICAID

## 2023-09-26 DIAGNOSIS — Z01.810 PRE-OPERATIVE CARDIOVASCULAR EXAMINATION: ICD-10-CM

## 2023-09-26 DIAGNOSIS — Z01.812 PRE-OPERATIVE LABORATORY EXAMINATION: ICD-10-CM

## 2023-09-26 LAB
ABO GROUP BLD: NORMAL
ANION GAP SERPL CALC-SCNC: 12 MMOL/L (ref 7–16)
APPEARANCE UR: CLEAR
BASOPHILS # BLD AUTO: 0.4 % (ref 0–1.8)
BASOPHILS # BLD: 0.02 K/UL (ref 0–0.12)
BILIRUB UR QL STRIP.AUTO: NEGATIVE
BLD GP AB SCN SERPL QL: NORMAL
BUN SERPL-MCNC: 7 MG/DL (ref 8–22)
CALCIUM SERPL-MCNC: 8.9 MG/DL (ref 8.5–10.5)
CHLORIDE SERPL-SCNC: 107 MMOL/L (ref 96–112)
CO2 SERPL-SCNC: 21 MMOL/L (ref 20–33)
COLOR UR: YELLOW
CREAT SERPL-MCNC: 0.71 MG/DL (ref 0.5–1.4)
EKG IMPRESSION: NORMAL
EOSINOPHIL # BLD AUTO: 0.06 K/UL (ref 0–0.51)
EOSINOPHIL NFR BLD: 1.1 % (ref 0–6.9)
ERYTHROCYTE [DISTWIDTH] IN BLOOD BY AUTOMATED COUNT: 41.3 FL (ref 35.9–50)
GFR SERPLBLD CREATININE-BSD FMLA CKD-EPI: 117 ML/MIN/1.73 M 2
GLUCOSE SERPL-MCNC: 81 MG/DL (ref 65–99)
GLUCOSE UR STRIP.AUTO-MCNC: NEGATIVE MG/DL
HCG SERPL QL: NEGATIVE
HCT VFR BLD AUTO: 43.9 % (ref 37–47)
HGB BLD-MCNC: 14.9 G/DL (ref 12–16)
IMM GRANULOCYTES # BLD AUTO: 0.01 K/UL (ref 0–0.11)
IMM GRANULOCYTES NFR BLD AUTO: 0.2 % (ref 0–0.9)
KETONES UR STRIP.AUTO-MCNC: NEGATIVE MG/DL
LEUKOCYTE ESTERASE UR QL STRIP.AUTO: NEGATIVE
LYMPHOCYTES # BLD AUTO: 2.29 K/UL (ref 1–4.8)
LYMPHOCYTES NFR BLD: 40.3 % (ref 22–41)
MCH RBC QN AUTO: 30 PG (ref 27–33)
MCHC RBC AUTO-ENTMCNC: 33.9 G/DL (ref 32.2–35.5)
MCV RBC AUTO: 88.3 FL (ref 81.4–97.8)
MICRO URNS: NORMAL
MONOCYTES # BLD AUTO: 0.43 K/UL (ref 0–0.85)
MONOCYTES NFR BLD AUTO: 7.6 % (ref 0–13.4)
NEUTROPHILS # BLD AUTO: 2.87 K/UL (ref 1.82–7.42)
NEUTROPHILS NFR BLD: 50.4 % (ref 44–72)
NITRITE UR QL STRIP.AUTO: NEGATIVE
NRBC # BLD AUTO: 0 K/UL
NRBC BLD-RTO: 0 /100 WBC (ref 0–0.2)
PH UR STRIP.AUTO: 7 [PH] (ref 5–8)
PLATELET # BLD AUTO: 281 K/UL (ref 164–446)
PMV BLD AUTO: 10.3 FL (ref 9–12.9)
POTASSIUM SERPL-SCNC: 3.8 MMOL/L (ref 3.6–5.5)
PROT UR QL STRIP: NEGATIVE MG/DL
RBC # BLD AUTO: 4.97 M/UL (ref 4.2–5.4)
RBC UR QL AUTO: NEGATIVE
RH BLD: NORMAL
SODIUM SERPL-SCNC: 140 MMOL/L (ref 135–145)
SP GR UR STRIP.AUTO: 1.01
UROBILINOGEN UR STRIP.AUTO-MCNC: 0.2 MG/DL
WBC # BLD AUTO: 5.7 K/UL (ref 4.8–10.8)

## 2023-09-26 PROCEDURE — 93010 ELECTROCARDIOGRAM REPORT: CPT | Performed by: INTERNAL MEDICINE

## 2023-09-26 PROCEDURE — 93005 ELECTROCARDIOGRAM TRACING: CPT

## 2023-09-26 PROCEDURE — 85025 COMPLETE CBC W/AUTO DIFF WBC: CPT

## 2023-09-26 PROCEDURE — 80048 BASIC METABOLIC PNL TOTAL CA: CPT

## 2023-09-26 PROCEDURE — 86901 BLOOD TYPING SEROLOGIC RH(D): CPT

## 2023-09-26 PROCEDURE — 84703 CHORIONIC GONADOTROPIN ASSAY: CPT

## 2023-09-26 PROCEDURE — 36415 COLL VENOUS BLD VENIPUNCTURE: CPT

## 2023-09-26 PROCEDURE — 86850 RBC ANTIBODY SCREEN: CPT

## 2023-09-26 PROCEDURE — 86900 BLOOD TYPING SEROLOGIC ABO: CPT

## 2023-09-26 PROCEDURE — 81003 URINALYSIS AUTO W/O SCOPE: CPT

## 2023-09-27 ENCOUNTER — HOSPITAL ENCOUNTER (OUTPATIENT)
Facility: MEDICAL CENTER | Age: 28
End: 2023-09-28
Attending: OBSTETRICS & GYNECOLOGY | Admitting: OBSTETRICS & GYNECOLOGY
Payer: MEDICAID

## 2023-09-27 ENCOUNTER — ANESTHESIA (OUTPATIENT)
Dept: SURGERY | Facility: MEDICAL CENTER | Age: 28
End: 2023-09-27
Payer: MEDICAID

## 2023-09-27 DIAGNOSIS — G89.18 ACUTE POSTOPERATIVE PAIN: ICD-10-CM

## 2023-09-27 PROBLEM — N93.9 ABNORMAL UTERINE BLEEDING (AUB): Status: ACTIVE | Noted: 2023-09-27

## 2023-09-27 LAB — ABO + RH BLD: NORMAL

## 2023-09-27 PROCEDURE — 700102 HCHG RX REV CODE 250 W/ 637 OVERRIDE(OP): Mod: UD | Performed by: STUDENT IN AN ORGANIZED HEALTH CARE EDUCATION/TRAINING PROGRAM

## 2023-09-27 PROCEDURE — 160029 HCHG SURGERY MINUTES - 1ST 30 MINS LEVEL 4: Performed by: OBSTETRICS & GYNECOLOGY

## 2023-09-27 PROCEDURE — 160002 HCHG RECOVERY MINUTES (STAT): Performed by: OBSTETRICS & GYNECOLOGY

## 2023-09-27 PROCEDURE — 700102 HCHG RX REV CODE 250 W/ 637 OVERRIDE(OP): Mod: UD | Performed by: OBSTETRICS & GYNECOLOGY

## 2023-09-27 PROCEDURE — 160035 HCHG PACU - 1ST 60 MINS PHASE I: Performed by: OBSTETRICS & GYNECOLOGY

## 2023-09-27 PROCEDURE — A9270 NON-COVERED ITEM OR SERVICE: HCPCS | Mod: UD | Performed by: OBSTETRICS & GYNECOLOGY

## 2023-09-27 PROCEDURE — 770030 HCHG ROOM/CARE - EXTENDED RECOVERY EACH 15 MIN

## 2023-09-27 PROCEDURE — 160025 RECOVERY II MINUTES (STATS): Performed by: OBSTETRICS & GYNECOLOGY

## 2023-09-27 PROCEDURE — 700111 HCHG RX REV CODE 636 W/ 250 OVERRIDE (IP): Mod: UD | Performed by: STUDENT IN AN ORGANIZED HEALTH CARE EDUCATION/TRAINING PROGRAM

## 2023-09-27 PROCEDURE — 88307 TISSUE EXAM BY PATHOLOGIST: CPT

## 2023-09-27 PROCEDURE — 700101 HCHG RX REV CODE 250: Performed by: STUDENT IN AN ORGANIZED HEALTH CARE EDUCATION/TRAINING PROGRAM

## 2023-09-27 PROCEDURE — 700101 HCHG RX REV CODE 250: Mod: UD | Performed by: OBSTETRICS & GYNECOLOGY

## 2023-09-27 PROCEDURE — 700104 HCHG RX REV CODE 254: Mod: UD | Performed by: STUDENT IN AN ORGANIZED HEALTH CARE EDUCATION/TRAINING PROGRAM

## 2023-09-27 PROCEDURE — A9270 NON-COVERED ITEM OR SERVICE: HCPCS | Mod: UD | Performed by: STUDENT IN AN ORGANIZED HEALTH CARE EDUCATION/TRAINING PROGRAM

## 2023-09-27 PROCEDURE — 700105 HCHG RX REV CODE 258: Mod: UD | Performed by: STUDENT IN AN ORGANIZED HEALTH CARE EDUCATION/TRAINING PROGRAM

## 2023-09-27 PROCEDURE — 160041 HCHG SURGERY MINUTES - EA ADDL 1 MIN LEVEL 4: Performed by: OBSTETRICS & GYNECOLOGY

## 2023-09-27 PROCEDURE — 58571 TLH W/T/O 250 G OR LESS: CPT | Mod: 80 | Performed by: OBSTETRICS & GYNECOLOGY

## 2023-09-27 PROCEDURE — 700111 HCHG RX REV CODE 636 W/ 250 OVERRIDE (IP): Mod: JZ,UD | Performed by: OBSTETRICS & GYNECOLOGY

## 2023-09-27 PROCEDURE — 36415 COLL VENOUS BLD VENIPUNCTURE: CPT

## 2023-09-27 PROCEDURE — 160046 HCHG PACU - 1ST 60 MINS PHASE II: Performed by: OBSTETRICS & GYNECOLOGY

## 2023-09-27 PROCEDURE — 160009 HCHG ANES TIME/MIN: Performed by: OBSTETRICS & GYNECOLOGY

## 2023-09-27 PROCEDURE — 160048 HCHG OR STATISTICAL LEVEL 1-5: Performed by: OBSTETRICS & GYNECOLOGY

## 2023-09-27 PROCEDURE — 58571 TLH W/T/O 250 G OR LESS: CPT | Performed by: OBSTETRICS & GYNECOLOGY

## 2023-09-27 RX ORDER — LABETALOL HYDROCHLORIDE 5 MG/ML
5 INJECTION, SOLUTION INTRAVENOUS
Status: DISCONTINUED | OUTPATIENT
Start: 2023-09-27 | End: 2023-09-27 | Stop reason: HOSPADM

## 2023-09-27 RX ORDER — ALBUTEROL SULFATE 90 UG/1
AEROSOL, METERED RESPIRATORY (INHALATION) PRN
Status: DISCONTINUED | OUTPATIENT
Start: 2023-09-27 | End: 2023-09-27 | Stop reason: SURG

## 2023-09-27 RX ORDER — BUPIVACAINE HYDROCHLORIDE AND EPINEPHRINE 2.5; 5 MG/ML; UG/ML
INJECTION, SOLUTION EPIDURAL; INFILTRATION; INTRACAUDAL; PERINEURAL
Status: DISCONTINUED | OUTPATIENT
Start: 2023-09-27 | End: 2023-09-27 | Stop reason: HOSPADM

## 2023-09-27 RX ORDER — ACETAMINOPHEN 500 MG
1000 TABLET ORAL EVERY 6 HOURS PRN
Status: DISCONTINUED | OUTPATIENT
Start: 2023-10-02 | End: 2023-09-28 | Stop reason: HOSPADM

## 2023-09-27 RX ORDER — ROCURONIUM BROMIDE 10 MG/ML
INJECTION, SOLUTION INTRAVENOUS PRN
Status: DISCONTINUED | OUTPATIENT
Start: 2023-09-27 | End: 2023-09-27 | Stop reason: SURG

## 2023-09-27 RX ORDER — ACETAMINOPHEN 500 MG
1000 TABLET ORAL EVERY 6 HOURS PRN
Status: DISCONTINUED | OUTPATIENT
Start: 2023-10-02 | End: 2023-09-27

## 2023-09-27 RX ORDER — DIPHENHYDRAMINE HYDROCHLORIDE 50 MG/ML
12.5 INJECTION INTRAMUSCULAR; INTRAVENOUS
Status: DISCONTINUED | OUTPATIENT
Start: 2023-09-27 | End: 2023-09-27 | Stop reason: HOSPADM

## 2023-09-27 RX ORDER — SODIUM CHLORIDE, SODIUM LACTATE, POTASSIUM CHLORIDE, CALCIUM CHLORIDE 600; 310; 30; 20 MG/100ML; MG/100ML; MG/100ML; MG/100ML
INJECTION, SOLUTION INTRAVENOUS CONTINUOUS
Status: ACTIVE | OUTPATIENT
Start: 2023-09-27 | End: 2023-09-27

## 2023-09-27 RX ORDER — OXYCODONE HCL 5 MG/5 ML
10 SOLUTION, ORAL ORAL
Status: COMPLETED | OUTPATIENT
Start: 2023-09-27 | End: 2023-09-27

## 2023-09-27 RX ORDER — EPINEPHRINE 1 MG/ML(1)
AMPUL (ML) INJECTION
Status: DISPENSED
Start: 2023-09-27 | End: 2023-09-28

## 2023-09-27 RX ORDER — HALOPERIDOL 5 MG/ML
1 INJECTION INTRAMUSCULAR EVERY 6 HOURS PRN
Status: DISCONTINUED | OUTPATIENT
Start: 2023-09-27 | End: 2023-09-28 | Stop reason: HOSPADM

## 2023-09-27 RX ORDER — ACETAMINOPHEN 500 MG
1000 TABLET ORAL EVERY 6 HOURS
Status: DISCONTINUED | OUTPATIENT
Start: 2023-09-27 | End: 2023-09-27

## 2023-09-27 RX ORDER — CEFAZOLIN SODIUM 1 G/3ML
INJECTION, POWDER, FOR SOLUTION INTRAMUSCULAR; INTRAVENOUS PRN
Status: DISCONTINUED | OUTPATIENT
Start: 2023-09-27 | End: 2023-09-27 | Stop reason: SURG

## 2023-09-27 RX ORDER — LIDOCAINE HYDROCHLORIDE 20 MG/ML
INJECTION, SOLUTION EPIDURAL; INFILTRATION; INTRACAUDAL; PERINEURAL PRN
Status: DISCONTINUED | OUTPATIENT
Start: 2023-09-27 | End: 2023-09-27 | Stop reason: SURG

## 2023-09-27 RX ORDER — BUPROPION HYDROCHLORIDE 300 MG/1
300 TABLET ORAL DAILY
Status: DISCONTINUED | OUTPATIENT
Start: 2023-09-28 | End: 2023-09-27

## 2023-09-27 RX ORDER — SODIUM CHLORIDE, SODIUM LACTATE, POTASSIUM CHLORIDE, CALCIUM CHLORIDE 600; 310; 30; 20 MG/100ML; MG/100ML; MG/100ML; MG/100ML
INJECTION, SOLUTION INTRAVENOUS
Status: DISCONTINUED | OUTPATIENT
Start: 2023-09-27 | End: 2023-09-27 | Stop reason: SURG

## 2023-09-27 RX ORDER — MEPERIDINE HYDROCHLORIDE 25 MG/ML
12.5 INJECTION INTRAMUSCULAR; INTRAVENOUS; SUBCUTANEOUS
Status: DISCONTINUED | OUTPATIENT
Start: 2023-09-27 | End: 2023-09-27 | Stop reason: HOSPADM

## 2023-09-27 RX ORDER — EPHEDRINE SULFATE 50 MG/ML
5 INJECTION, SOLUTION INTRAVENOUS
Status: DISCONTINUED | OUTPATIENT
Start: 2023-09-27 | End: 2023-09-27 | Stop reason: HOSPADM

## 2023-09-27 RX ORDER — IPRATROPIUM BROMIDE AND ALBUTEROL SULFATE 2.5; .5 MG/3ML; MG/3ML
3 SOLUTION RESPIRATORY (INHALATION)
Status: DISCONTINUED | OUTPATIENT
Start: 2023-09-27 | End: 2023-09-27 | Stop reason: HOSPADM

## 2023-09-27 RX ORDER — POLYETHYLENE GLYCOL 3350 17 G/17G
1 POWDER, FOR SOLUTION ORAL 2 TIMES DAILY PRN
Status: DISCONTINUED | OUTPATIENT
Start: 2023-09-27 | End: 2023-09-28 | Stop reason: HOSPADM

## 2023-09-27 RX ORDER — AMOXICILLIN 250 MG
1 CAPSULE ORAL
Status: DISCONTINUED | OUTPATIENT
Start: 2023-09-27 | End: 2023-09-28 | Stop reason: HOSPADM

## 2023-09-27 RX ORDER — DEXAMETHASONE SODIUM PHOSPHATE 4 MG/ML
INJECTION, SOLUTION INTRA-ARTICULAR; INTRALESIONAL; INTRAMUSCULAR; INTRAVENOUS; SOFT TISSUE PRN
Status: DISCONTINUED | OUTPATIENT
Start: 2023-09-27 | End: 2023-09-27 | Stop reason: SURG

## 2023-09-27 RX ORDER — BUPIVACAINE HYDROCHLORIDE 2.5 MG/ML
INJECTION, SOLUTION EPIDURAL; INFILTRATION; INTRACAUDAL
Status: DISPENSED
Start: 2023-09-27 | End: 2023-09-28

## 2023-09-27 RX ORDER — ACETAMINOPHEN 500 MG
1000 TABLET ORAL ONCE
Status: COMPLETED | OUTPATIENT
Start: 2023-09-27 | End: 2023-09-27

## 2023-09-27 RX ORDER — PRAZOSIN HYDROCHLORIDE 2 MG/1
2 CAPSULE ORAL NIGHTLY
Status: DISCONTINUED | OUTPATIENT
Start: 2023-09-27 | End: 2023-09-28 | Stop reason: HOSPADM

## 2023-09-27 RX ORDER — HYDROMORPHONE HYDROCHLORIDE 1 MG/ML
0.2 INJECTION, SOLUTION INTRAMUSCULAR; INTRAVENOUS; SUBCUTANEOUS
Status: DISCONTINUED | OUTPATIENT
Start: 2023-09-27 | End: 2023-09-27 | Stop reason: HOSPADM

## 2023-09-27 RX ORDER — HYDROMORPHONE HYDROCHLORIDE 2 MG/ML
INJECTION, SOLUTION INTRAMUSCULAR; INTRAVENOUS; SUBCUTANEOUS PRN
Status: DISCONTINUED | OUTPATIENT
Start: 2023-09-27 | End: 2023-09-27 | Stop reason: SURG

## 2023-09-27 RX ORDER — DIAZEPAM 5 MG/1
5 TABLET ORAL EVERY 6 HOURS PRN
Status: DISCONTINUED | OUTPATIENT
Start: 2023-09-27 | End: 2023-09-28 | Stop reason: HOSPADM

## 2023-09-27 RX ORDER — IBUPROFEN 800 MG/1
800 TABLET ORAL 3 TIMES DAILY
Status: DISCONTINUED | OUTPATIENT
Start: 2023-09-27 | End: 2023-09-28 | Stop reason: HOSPADM

## 2023-09-27 RX ORDER — BISACODYL 10 MG
10 SUPPOSITORY, RECTAL RECTAL
Status: DISCONTINUED | OUTPATIENT
Start: 2023-09-27 | End: 2023-09-28 | Stop reason: HOSPADM

## 2023-09-27 RX ORDER — ACETAMINOPHEN 500 MG
1000 TABLET ORAL EVERY 6 HOURS
Status: DISCONTINUED | OUTPATIENT
Start: 2023-09-28 | End: 2023-09-28 | Stop reason: HOSPADM

## 2023-09-27 RX ORDER — SCOLOPAMINE TRANSDERMAL SYSTEM 1 MG/1
1 PATCH, EXTENDED RELEASE TRANSDERMAL
Status: DISCONTINUED | OUTPATIENT
Start: 2023-09-27 | End: 2023-09-28 | Stop reason: HOSPADM

## 2023-09-27 RX ORDER — DIPHENHYDRAMINE HYDROCHLORIDE 50 MG/ML
25 INJECTION INTRAMUSCULAR; INTRAVENOUS EVERY 6 HOURS PRN
Status: DISCONTINUED | OUTPATIENT
Start: 2023-09-27 | End: 2023-09-28 | Stop reason: HOSPADM

## 2023-09-27 RX ORDER — DOCUSATE SODIUM 100 MG/1
100 CAPSULE, LIQUID FILLED ORAL 2 TIMES DAILY
Status: DISCONTINUED | OUTPATIENT
Start: 2023-09-27 | End: 2023-09-28 | Stop reason: HOSPADM

## 2023-09-27 RX ORDER — BUPROPION HYDROCHLORIDE 100 MG/1
200 TABLET, EXTENDED RELEASE ORAL 2 TIMES DAILY
Status: DISCONTINUED | OUTPATIENT
Start: 2023-09-28 | End: 2023-09-28

## 2023-09-27 RX ORDER — OXYCODONE HYDROCHLORIDE 10 MG/1
10 TABLET ORAL
Status: DISCONTINUED | OUTPATIENT
Start: 2023-09-27 | End: 2023-09-28 | Stop reason: HOSPADM

## 2023-09-27 RX ORDER — ALBUTEROL SULFATE 90 UG/1
1 AEROSOL, METERED RESPIRATORY (INHALATION) PRN
Status: DISCONTINUED | OUTPATIENT
Start: 2023-09-27 | End: 2023-09-28 | Stop reason: HOSPADM

## 2023-09-27 RX ORDER — ONDANSETRON 2 MG/ML
INJECTION INTRAMUSCULAR; INTRAVENOUS PRN
Status: DISCONTINUED | OUTPATIENT
Start: 2023-09-27 | End: 2023-09-27 | Stop reason: SURG

## 2023-09-27 RX ORDER — HYDROMORPHONE HYDROCHLORIDE 1 MG/ML
0.4 INJECTION, SOLUTION INTRAMUSCULAR; INTRAVENOUS; SUBCUTANEOUS
Status: DISCONTINUED | OUTPATIENT
Start: 2023-09-27 | End: 2023-09-27 | Stop reason: HOSPADM

## 2023-09-27 RX ORDER — OXYCODONE HCL 5 MG/5 ML
5 SOLUTION, ORAL ORAL
Status: COMPLETED | OUTPATIENT
Start: 2023-09-27 | End: 2023-09-27

## 2023-09-27 RX ORDER — HYDROMORPHONE HYDROCHLORIDE 1 MG/ML
0.1 INJECTION, SOLUTION INTRAMUSCULAR; INTRAVENOUS; SUBCUTANEOUS
Status: DISCONTINUED | OUTPATIENT
Start: 2023-09-27 | End: 2023-09-27 | Stop reason: HOSPADM

## 2023-09-27 RX ORDER — ONDANSETRON 2 MG/ML
4 INJECTION INTRAMUSCULAR; INTRAVENOUS EVERY 4 HOURS PRN
Status: DISCONTINUED | OUTPATIENT
Start: 2023-09-27 | End: 2023-09-28 | Stop reason: HOSPADM

## 2023-09-27 RX ORDER — HALOPERIDOL 5 MG/ML
1 INJECTION INTRAMUSCULAR
Status: DISCONTINUED | OUTPATIENT
Start: 2023-09-27 | End: 2023-09-27 | Stop reason: HOSPADM

## 2023-09-27 RX ORDER — OXYCODONE HYDROCHLORIDE 5 MG/1
5 TABLET ORAL
Status: DISCONTINUED | OUTPATIENT
Start: 2023-09-27 | End: 2023-09-28 | Stop reason: HOSPADM

## 2023-09-27 RX ORDER — MORPHINE SULFATE 4 MG/ML
4 INJECTION INTRAVENOUS
Status: DISCONTINUED | OUTPATIENT
Start: 2023-09-27 | End: 2023-09-28 | Stop reason: HOSPADM

## 2023-09-27 RX ORDER — ONDANSETRON 2 MG/ML
4 INJECTION INTRAMUSCULAR; INTRAVENOUS
Status: DISCONTINUED | OUTPATIENT
Start: 2023-09-27 | End: 2023-09-27 | Stop reason: HOSPADM

## 2023-09-27 RX ORDER — HYDRALAZINE HYDROCHLORIDE 20 MG/ML
5 INJECTION INTRAMUSCULAR; INTRAVENOUS
Status: DISCONTINUED | OUTPATIENT
Start: 2023-09-27 | End: 2023-09-27 | Stop reason: HOSPADM

## 2023-09-27 RX ORDER — IBUPROFEN 800 MG/1
800 TABLET ORAL 3 TIMES DAILY PRN
Status: DISCONTINUED | OUTPATIENT
Start: 2023-10-02 | End: 2023-09-28 | Stop reason: HOSPADM

## 2023-09-27 RX ORDER — ENEMA 19; 7 G/133ML; G/133ML
1 ENEMA RECTAL
Status: DISCONTINUED | OUTPATIENT
Start: 2023-09-27 | End: 2023-09-28 | Stop reason: HOSPADM

## 2023-09-27 RX ORDER — DEXAMETHASONE SODIUM PHOSPHATE 4 MG/ML
4 INJECTION, SOLUTION INTRA-ARTICULAR; INTRALESIONAL; INTRAMUSCULAR; INTRAVENOUS; SOFT TISSUE
Status: DISCONTINUED | OUTPATIENT
Start: 2023-09-27 | End: 2023-09-28 | Stop reason: HOSPADM

## 2023-09-27 RX ORDER — SODIUM CHLORIDE, SODIUM LACTATE, POTASSIUM CHLORIDE, CALCIUM CHLORIDE 600; 310; 30; 20 MG/100ML; MG/100ML; MG/100ML; MG/100ML
INJECTION, SOLUTION INTRAVENOUS CONTINUOUS
Status: DISCONTINUED | OUTPATIENT
Start: 2023-09-27 | End: 2023-09-27 | Stop reason: HOSPADM

## 2023-09-27 RX ORDER — AMOXICILLIN 250 MG
1 CAPSULE ORAL NIGHTLY
Status: DISCONTINUED | OUTPATIENT
Start: 2023-09-27 | End: 2023-09-28 | Stop reason: HOSPADM

## 2023-09-27 RX ADMIN — HYDROMORPHONE HYDROCHLORIDE 0.4 MG: 2 INJECTION INTRAMUSCULAR; INTRAVENOUS; SUBCUTANEOUS at 17:15

## 2023-09-27 RX ADMIN — HYDROMORPHONE HYDROCHLORIDE 0.2 MG: 1 INJECTION, SOLUTION INTRAMUSCULAR; INTRAVENOUS; SUBCUTANEOUS at 18:18

## 2023-09-27 RX ADMIN — ONDANSETRON 4 MG: 2 INJECTION INTRAMUSCULAR; INTRAVENOUS at 17:17

## 2023-09-27 RX ADMIN — OXYCODONE HYDROCHLORIDE 10 MG: 5 SOLUTION ORAL at 17:44

## 2023-09-27 RX ADMIN — ROCURONIUM BROMIDE 20 MG: 50 INJECTION, SOLUTION INTRAVENOUS at 16:38

## 2023-09-27 RX ADMIN — DEXAMETHASONE SODIUM PHOSPHATE 4 MG: 4 INJECTION INTRA-ARTICULAR; INTRALESIONAL; INTRAMUSCULAR; INTRAVENOUS; SOFT TISSUE at 15:11

## 2023-09-27 RX ADMIN — LIDOCAINE HYDROCHLORIDE 100 MG: 20 INJECTION, SOLUTION EPIDURAL; INFILTRATION; INTRACAUDAL at 15:06

## 2023-09-27 RX ADMIN — FENTANYL CITRATE 50 MCG: 50 INJECTION, SOLUTION INTRAMUSCULAR; INTRAVENOUS at 17:53

## 2023-09-27 RX ADMIN — IBUPROFEN 800 MG: 800 TABLET, FILM COATED ORAL at 21:19

## 2023-09-27 RX ADMIN — ROCURONIUM BROMIDE 20 MG: 50 INJECTION, SOLUTION INTRAVENOUS at 15:41

## 2023-09-27 RX ADMIN — HYDROMORPHONE HYDROCHLORIDE 0.2 MG: 2 INJECTION INTRAMUSCULAR; INTRAVENOUS; SUBCUTANEOUS at 17:27

## 2023-09-27 RX ADMIN — HYDROMORPHONE HYDROCHLORIDE 0.2 MG: 1 INJECTION, SOLUTION INTRAMUSCULAR; INTRAVENOUS; SUBCUTANEOUS at 18:25

## 2023-09-27 RX ADMIN — SUGAMMADEX 200 MG: 100 INJECTION, SOLUTION INTRAVENOUS at 17:17

## 2023-09-27 RX ADMIN — HYDROMORPHONE HYDROCHLORIDE 0.4 MG: 2 INJECTION INTRAMUSCULAR; INTRAVENOUS; SUBCUTANEOUS at 15:16

## 2023-09-27 RX ADMIN — CEFAZOLIN 3 G: 1 INJECTION, POWDER, FOR SOLUTION INTRAMUSCULAR; INTRAVENOUS at 15:11

## 2023-09-27 RX ADMIN — BREXPIPRAZOLE 1 TABLET: 1 TABLET ORAL at 21:19

## 2023-09-27 RX ADMIN — FENTANYL CITRATE 100 MCG: 50 INJECTION, SOLUTION INTRAMUSCULAR; INTRAVENOUS at 15:06

## 2023-09-27 RX ADMIN — HYDROMORPHONE HYDROCHLORIDE 0.4 MG: 1 INJECTION, SOLUTION INTRAMUSCULAR; INTRAVENOUS; SUBCUTANEOUS at 18:04

## 2023-09-27 RX ADMIN — INDIGOTINDISULFONATE SODIUM 5 ML: 8 INJECTION INTRAVENOUS at 17:06

## 2023-09-27 RX ADMIN — ALBUTEROL SULFATE 2 PUFF: 90 AEROSOL, METERED RESPIRATORY (INHALATION) at 15:04

## 2023-09-27 RX ADMIN — SODIUM CHLORIDE, POTASSIUM CHLORIDE, SODIUM LACTATE AND CALCIUM CHLORIDE: 600; 310; 30; 20 INJECTION, SOLUTION INTRAVENOUS at 14:57

## 2023-09-27 RX ADMIN — HYDROMORPHONE HYDROCHLORIDE 0.2 MG: 1 INJECTION, SOLUTION INTRAMUSCULAR; INTRAVENOUS; SUBCUTANEOUS at 18:34

## 2023-09-27 RX ADMIN — ROCURONIUM BROMIDE 60 MG: 50 INJECTION, SOLUTION INTRAVENOUS at 15:06

## 2023-09-27 RX ADMIN — MORPHINE SULFATE 4 MG: 4 INJECTION, SOLUTION INTRAMUSCULAR; INTRAVENOUS at 23:52

## 2023-09-27 RX ADMIN — FENTANYL CITRATE 50 MCG: 50 INJECTION, SOLUTION INTRAMUSCULAR; INTRAVENOUS at 17:45

## 2023-09-27 RX ADMIN — ACETAMINOPHEN 1000 MG: 500 TABLET, FILM COATED ORAL at 17:57

## 2023-09-27 RX ADMIN — PRAZOSIN HYDROCHLORIDE 2 MG: 2 CAPSULE ORAL at 21:19

## 2023-09-27 RX ADMIN — ROCURONIUM BROMIDE 20 MG: 50 INJECTION, SOLUTION INTRAVENOUS at 16:17

## 2023-09-27 RX ADMIN — PROPOFOL 200 MG: 10 INJECTION, EMULSION INTRAVENOUS at 15:06

## 2023-09-27 RX ADMIN — OXYCODONE HYDROCHLORIDE 10 MG: 10 TABLET ORAL at 22:04

## 2023-09-27 RX ADMIN — DOCUSATE SODIUM 100 MG: 100 CAPSULE, LIQUID FILLED ORAL at 21:18

## 2023-09-27 RX ADMIN — ACETAMINOPHEN 1000 MG: 500 TABLET, FILM COATED ORAL at 23:58

## 2023-09-27 RX ADMIN — HYDROMORPHONE HYDROCHLORIDE 0.2 MG: 2 INJECTION INTRAMUSCULAR; INTRAVENOUS; SUBCUTANEOUS at 15:41

## 2023-09-27 ASSESSMENT — PAIN DESCRIPTION - PAIN TYPE
TYPE: SURGICAL PAIN

## 2023-09-27 ASSESSMENT — PAIN SCALES - GENERAL: PAIN_LEVEL: 2

## 2023-09-27 ASSESSMENT — FIBROSIS 4 INDEX: FIB4 SCORE: 0.38

## 2023-09-27 NOTE — ANESTHESIA PROCEDURE NOTES
Airway    Date/Time: 9/27/2023 3:10 PM    Performed by: Migue Cottrell M.D.  Authorized by: Migue Cottrell M.D.    Location:  OR  Urgency:  Elective  Indications for Airway Management:  Anesthesia      Spontaneous Ventilation: absent    Sedation Level:  Deep  Preoxygenated: Yes    Patient Position:  Sniffing  Mask Difficulty Assessment:  3 - difficult mask (inadequate, unstable or two providers) +/- NMBA  Final Airway Type:  Endotracheal airway  Final Endotracheal Airway:  ETT  Cuffed: Yes    Technique Used for Successful ETT Placement:  Direct laryngoscopy    Insertion Site:  Oral  Blade Type:  Glide  Laryngoscope Blade/Videolaryngoscope Blade Size:  3  ETT Size (mm):  7.0  Measured from:  Teeth  ETT to Teeth (cm):  21  Placement Verified by: auscultation and capnometry    Cormack-Lehane Classification:  Grade I - full view of glottis  Number of Attempts at Approach:  1  Number of Other Approaches Attempted:  1  Unsuccessful Airway(s) Attempted:  Oropharyngeal  Unsuccessful Approach(es) for ETT:  Direct laryngoscopy   Grade III view on direct laryngoscopy. View may have been improved by removing velcro foam pillow, but we switched to glidescope instead

## 2023-09-27 NOTE — ANESTHESIA PREPROCEDURE EVALUATION
Case: [id number] Date/Time: 09/27/23 1215    Procedure: TOTAL LAPAROSCOPIC HYSTERECTOMY, BILATERAL SALPINGECTOMY, CYSTOSCOPY    Pre-op diagnosis: ABNORMAL UTERINE BLEEDING, DYSMENORRHEA    Location: [facility] ROOM 25 / SURGERY SAME DAY [facility]    Surgeons: Kaia Mcneill M.D.        27 yo F w/ BMI 48, asthma, JAH, quit smoking 2 weeks ago    Relevant Problems   PULMONARY   (positive) Asthma       Physical Exam    Airway   Mallampati: IV  TM distance: >3 FB  Neck ROM: full       Cardiovascular - normal exam  Rhythm: regular  Rate: normal  (-) murmur     Dental - normal exam           Pulmonary - normal exam  Breath sounds clear to auscultation     Abdominal    Neurological - normal exam                 Anesthesia Plan    ASA 3   ASA physical status 3 criteria: morbid obesity - BMI greater than or equal to 40    Plan - general       Airway plan will be ETT          Induction: intravenous    Postoperative Plan: Postoperative administration of opioids is intended.    Pertinent diagnostic labs and testing reviewed    Informed Consent:    Anesthetic plan and risks discussed with patient.    Use of blood products discussed with: patient whom consented to blood products.

## 2023-09-28 VITALS
TEMPERATURE: 98.3 F | BODY MASS INDEX: 45.99 KG/M2 | OXYGEN SATURATION: 93 % | RESPIRATION RATE: 18 BRPM | SYSTOLIC BLOOD PRESSURE: 104 MMHG | DIASTOLIC BLOOD PRESSURE: 50 MMHG | WEIGHT: 293 LBS | HEART RATE: 78 BPM | HEIGHT: 67 IN

## 2023-09-28 LAB
BASOPHILS # BLD AUTO: 0.1 % (ref 0–1.8)
BASOPHILS # BLD: 0.01 K/UL (ref 0–0.12)
EOSINOPHIL # BLD AUTO: 0 K/UL (ref 0–0.51)
EOSINOPHIL NFR BLD: 0 % (ref 0–6.9)
ERYTHROCYTE [DISTWIDTH] IN BLOOD BY AUTOMATED COUNT: 41.1 FL (ref 35.9–50)
HCT VFR BLD AUTO: 41.2 % (ref 37–47)
HGB BLD-MCNC: 13.9 G/DL (ref 12–16)
IMM GRANULOCYTES # BLD AUTO: 0.04 K/UL (ref 0–0.11)
IMM GRANULOCYTES NFR BLD AUTO: 0.4 % (ref 0–0.9)
LYMPHOCYTES # BLD AUTO: 1.31 K/UL (ref 1–4.8)
LYMPHOCYTES NFR BLD: 14 % (ref 22–41)
MCH RBC QN AUTO: 30.2 PG (ref 27–33)
MCHC RBC AUTO-ENTMCNC: 33.7 G/DL (ref 32.2–35.5)
MCV RBC AUTO: 89.4 FL (ref 81.4–97.8)
MONOCYTES # BLD AUTO: 0.5 K/UL (ref 0–0.85)
MONOCYTES NFR BLD AUTO: 5.3 % (ref 0–13.4)
NEUTROPHILS # BLD AUTO: 7.51 K/UL (ref 1.82–7.42)
NEUTROPHILS NFR BLD: 80.2 % (ref 44–72)
NRBC # BLD AUTO: 0 K/UL
NRBC BLD-RTO: 0 /100 WBC (ref 0–0.2)
PATHOLOGY CONSULT NOTE: NORMAL
PLATELET # BLD AUTO: 276 K/UL (ref 164–446)
PMV BLD AUTO: 10.1 FL (ref 9–12.9)
RBC # BLD AUTO: 4.61 M/UL (ref 4.2–5.4)
WBC # BLD AUTO: 9.4 K/UL (ref 4.8–10.8)

## 2023-09-28 PROCEDURE — 36415 COLL VENOUS BLD VENIPUNCTURE: CPT

## 2023-09-28 PROCEDURE — 700102 HCHG RX REV CODE 250 W/ 637 OVERRIDE(OP): Mod: UD | Performed by: OBSTETRICS & GYNECOLOGY

## 2023-09-28 PROCEDURE — 85025 COMPLETE CBC W/AUTO DIFF WBC: CPT

## 2023-09-28 PROCEDURE — 770030 HCHG ROOM/CARE - EXTENDED RECOVERY EACH 15 MIN

## 2023-09-28 PROCEDURE — A9270 NON-COVERED ITEM OR SERVICE: HCPCS | Mod: UD | Performed by: OBSTETRICS & GYNECOLOGY

## 2023-09-28 RX ORDER — BUPROPION HYDROCHLORIDE 100 MG/1
200 TABLET, EXTENDED RELEASE ORAL 2 TIMES DAILY
Status: DISCONTINUED | OUTPATIENT
Start: 2023-09-28 | End: 2023-09-28 | Stop reason: HOSPADM

## 2023-09-28 RX ORDER — IBUPROFEN 800 MG/1
800 TABLET ORAL 3 TIMES DAILY
Qty: 30 TABLET | Refills: 2 | Status: SHIPPED | OUTPATIENT
Start: 2023-09-28 | End: 2024-02-14

## 2023-09-28 RX ORDER — OXYCODONE HYDROCHLORIDE AND ACETAMINOPHEN 5; 325 MG/1; MG/1
1 TABLET ORAL EVERY 4 HOURS PRN
Qty: 15 TABLET | Refills: 0 | Status: SHIPPED | OUTPATIENT
Start: 2023-09-28 | End: 2023-10-03

## 2023-09-28 RX ORDER — AMOXICILLIN 250 MG
1 CAPSULE ORAL NIGHTLY
Qty: 30 TABLET | Refills: 0 | Status: SHIPPED | OUTPATIENT
Start: 2023-09-28 | End: 2023-10-16

## 2023-09-28 RX ADMIN — ACETAMINOPHEN 1000 MG: 500 TABLET, FILM COATED ORAL at 06:01

## 2023-09-28 RX ADMIN — IBUPROFEN 800 MG: 800 TABLET, FILM COATED ORAL at 05:21

## 2023-09-28 RX ADMIN — OXYCODONE HYDROCHLORIDE 5 MG: 5 TABLET ORAL at 11:00

## 2023-09-28 RX ADMIN — ACETAMINOPHEN 1000 MG: 500 TABLET, FILM COATED ORAL at 14:03

## 2023-09-28 RX ADMIN — DOCUSATE SODIUM 100 MG: 100 CAPSULE, LIQUID FILLED ORAL at 06:01

## 2023-09-28 RX ADMIN — OXYCODONE HYDROCHLORIDE 10 MG: 10 TABLET ORAL at 06:51

## 2023-09-28 RX ADMIN — OXYCODONE HYDROCHLORIDE 5 MG: 5 TABLET ORAL at 15:19

## 2023-09-28 RX ADMIN — IBUPROFEN 800 MG: 800 TABLET, FILM COATED ORAL at 15:01

## 2023-09-28 RX ADMIN — BUPROPION HYDROCHLORIDE 200 MG: 100 TABLET, FILM COATED, EXTENDED RELEASE ORAL at 11:22

## 2023-09-28 ASSESSMENT — PAIN DESCRIPTION - PAIN TYPE
TYPE: ACUTE PAIN

## 2023-09-28 NOTE — PROGRESS NOTES
"Gynecology Progress Note    Patient: Vanessa Sanchez MRN: [id number]     YOB: 1995  Age: 28 y.o.  Sex: female    Unit: POSTPARTUM [facility] [place] [place] Room/Bed: S332/00 Location: [facility]     Admitting Physician: DEBBIE FRANCO  Date of  Admission: 2023     Primary Care Physician: RITA Baxter        ADMISSION Diagnoses:  Abnormal uterine bleeding (AUB) [N93.9]    SUBJECTIVE:  Vanessa Sanchez is a 28 y.o. female  admitted for AUB. Pain is controlled with ibuprofen and tylenol. The patient is tolerating po fluids/solids. She is ambulating.     Review of systems:  Constitutional- No fever or chills.   HEENT- PERRLA  Lungs- No respiratory distress. No CP or SOB.  GI- No n/v/d. Mild lower abdominal discofmot.    OBJECTIVE:  Vital Signs: /49   Pulse 60   Temp 36.8 °C (98.2 °F) (Temporal)   Resp 18   Ht 1.702 m (5' 7\")   Wt (!) 138 kg (305 lb 1.9 oz)   SpO2 94%   BMI 47.79 kg/m²   Body mass index is 47.79 kg/m².    Intake/Output Summary (Last 24 hours) at 2023 0940  Last data filed at 2023 0600  Gross per 24 hour   Intake 1000 ml   Output 720 ml   Net 280 ml     Appearance/Psychiatric: to be in no distress  Constitutional: The patient is well nourished.  Cardiovascular: She does not have edema.  Respiratory: Her lungs are clear and her respiratory effort is normal.  Gastrointestinal: Soft, flat and non-tender and No masses or organomegaly. Abdominal binder in place. Rhodes in place with yellow urine present. No hematuria.   The incision is clean,dry, and intact.        ASSESSMENT:   LOS: 0 days , 1 Day Post-Op    Vanessa Sanchez is a 28 y.o. female  admitted for AUB. POD #1 s/p total laparoscopic hysterectomy, bilateral salpingectomy, cystoscopy. Overall, she is doing well. She is requesting to have her rhodes removed. Able to tolerate po fluids/solids.       PLAN:     Remove rhodes this AM  Make sure patient is able to void prior to " discharge  Dr. Mcneill to see at lunch    Electronically signed by:   Karla Enciso P.A.-C.  9/28/2023

## 2023-09-28 NOTE — ANESTHESIA TIME REPORT
Anesthesia Start and Stop Event Times     Date Time Event    9/27/2023 1414 Ready for Procedure     1457 Anesthesia Start     1741 Anesthesia Stop        Responsible Staff  09/27/23    Name Role Begin End    Migue Cottrell M.D. Anesth 1457 1741        Overtime Reason:  no overtime (within assigned shift)    Comments:

## 2023-09-28 NOTE — ANESTHESIA POSTPROCEDURE EVALUATION
Patient: Vanessa Sanchez    Procedure Summary     Date: 09/27/23 Room / Location: [facility] ROOM 25 / SURGERY SAME DAY [facility]    Anesthesia Start: 1457 Anesthesia Stop: 1741    Procedure: TOTAL LAPAROSCOPIC HYSTERECTOMY, BILATERAL SALPINGECTOMY, CYSTOSCOPY (Abdomen) Diagnosis: (ABNORMAL UTERINE BLEEDING, DYSMENORRHEA)    Surgeons: Kaia Mcneill M.D. Responsible Provider: Migue Cottrell M.D.    Anesthesia Type: general ASA Status: 3          Final Anesthesia Type: general  Last vitals  BP   Blood Pressure: 126/64    Temp   36.6 °C (97.8 °F)    Pulse   89   Resp   16    SpO2   99 %      Anesthesia Post Evaluation    Patient location during evaluation: PACU  Patient participation: complete - patient participated  Level of consciousness: awake  Pain score: 2    Airway patency: patent  Anesthetic complications: no  Cardiovascular status: hemodynamically stable  Respiratory status: acceptable  Hydration status: acceptable    PONV: none          No notable events documented.     Nurse Pain Score: 0 (NPRS)

## 2023-09-28 NOTE — PROGRESS NOTES
Discharge paperwork for patient discussed at bedside. All questions answered. Follow-up appointments reviewed.  Paperwork signed and dated at this time.    1548- Patient discharged with escort off unit in wheelchair. All questions answered at this time.

## 2023-09-28 NOTE — PROGRESS NOTES
2000  Pt received in room 232.  2204  Oxycodone 10 mg given for level 7 incisional pain  2300   Pt calling stating no improvement with pain. No morphine on the floor.  Pharmacy called and pt informed the medication on the way.

## 2023-09-28 NOTE — OP REPORT
OPERATIVE REPORT    Date: 2023    Surgeon: Kaia Mcneill M.D.    Assistant:   RICKI Amin PA-C    Anesthesiologist: Migue Cottrell MD    Pre-operative Diagnosis: Abnormal uterine bleeding, dysmenorrhea    Post-operative Diagnosis: Same as above    Procedure: Total laparoscopic hysterectomy, bilateral salpingectomy, cystoscopy.     EBL: 100 ml    UOP: 120 ml clear yellow urine prior to cystoscopy    Drains: Malik to gravity    Dispo: To PACU    Indications:   28 y.o.  with AUB, dysmenorrhea, unable to be medically managed, desiring sterilization.   Risks of surgery discussed with patient, including, but not limited to, bleeding, infection, damage to other organs such as bowel, bladder, ureters, and nerves, need for reoperation, need for blood transfusion if indicated. Patient understands all risks and all questions answered.        Findings: Normal but globular appearing uterus, normal fallopian tubes and ovaries bilaterally.     PROCEDURE IN DETAIL: After informed consent was obtained, the patient was taken to the operating room where general endotracheal anesthesia was administered without difficulty. She was then prepped and draped in the usual sterile fashion in the lithotomy position with Yellowfin stirrups. A formal time out was called prior to the procedure and the preoperative antibiotics were given. Examination under anesthesia was performed and a Malik catheter was placed under sterile technique. V-Care uterine manipulator was placed without any difficulty. The procedure went towards the abdomen at this time. Lidocaine with epinephrine was Infiltrated into the umbilicus and a 5mm incision was made in the umbilicus with a scalpel.  The veress needle was placed intraabdominally- placement was confirmed with a drop in intraabdominal pressure with a slow and steady rise with insufflation of CO2 gas at a pressure of 15 mmHg.  A 5mm trochar was placed over the laparoscope and the  scope/trochar were advanced into the abdominal cavity under direct laparoscopic visualization.  Intraabdominal placement was confirmed and also to note that there was no injury to the underlying tissue. A survey of the pelvis and abdomen was then performed as noted above. The ureters were identified and noted to have peristalsis.  Their location was noted during the entire case.  Marcaine with epinephrine was then injected into left lower quadrant approximately 2 cm medial to the anterior ischial spine and a 5 mm skin incision was made with a knife. The 5 mm trocar was then advanced under direct visualization with no injury to the underlying tissue. A 5mm trochar was placed in the right lower quadrant in the exact same fashion. The procedure was then started. Patient was placed in trendelenburg. Bowel was gently retracted out of the pelvis and uterus was manipulated by Vcare. Starting with the right side, the fallopian tube was identified and transected with the Ligasure device. The utero-ovarian ligament was then identified and transected using the ligasure device. The round ligament on the right was then grasped with bipolar cautery, cauterized and cut with the Ligasure. The anterior and posterior leaf of the broad ligament were then incised along the bladder reflection to the midline and the uterine arteries were then skeletonized, grasped with bipolar cautery, cauterized and transected with the Ligasure. Again, hemostasis was noted.      The procedure at this point went towards the patient's left. The left fallopian tube was identified and transected with the Ligasure device.  Hemostasis was noted. The utero-ovarian ligament was identified and transected using the Ligasure device. The round ligament was then grasped with bipolar cautery, cauterized and cut with the Ligasure device. The anterior and posterior leaf of the broad ligament were then incised along the bladder reflection to the midline and the uterine  arteries were then skeletonized and grasped with bipolar cautery, cauterized and transected with the Ligasure. Again, hemostasis was noted. The bladder was then gently dissected off the lower uterine segment and the cervix. The colpotomy was made in a circumferential fashion around the V-care ring using the J hook and the entire specimen was removed vaginally. A glove with sponges was placed in the vagina to maintain pneumoperitoneum. The pelvis was then copiously irrigated with normal saline and hemostasis was noted. The vaginal cuff and angles were closed vaginally using an 0 Vicryl in a horizontal running fashion from left to right. Attention was then turned back abdominally and some bilateral pedicles were hemostatic and vaginal cuff was hemostatic.     The rhodes was removed from the bladder.  IV methylene blue was administered by anesthesiologist. A cystoscopy was performed that demonstrated an intact bladder and bilateral vigorous ureteral jets. At this time, the cystoscope was removed and the procedure was complete.     All instruments were removed under direct visualization as was the CO2 gas. The skin at all ports were reapproximated with 4-0 monocryl subcuticular fashion.    Sponge, needle, instrument, and lap counts were correct x2. Patient tolerated the procedure well and went to recovery room in stable condition.  Total blood loss was about 100 ml.    Kaia Mcneill M.D.    Obstetrics and Gynecology    9/27/2023 5:55 PM

## 2023-09-28 NOTE — OR SURGEON
Immediate Post OP Note    PreOp Diagnosis: Abnormal uterine bleeding, dysmenorrhea      PostOp Diagnosis: Same as above      Procedure(s):  TOTAL LAPAROSCOPIC HYSTERECTOMY, BILATERAL SALPINGECTOMY, CYSTOSCOPY - Wound Class: Clean    Surgeon(s):  HENRY Torres D.O. Sam Schultz, PA-C    Anesthesiologist/Type of Anesthesia:  Anesthesiologist: Migue Cottrell M.D./General    Surgical Staff:  Circulator: Kerry Jones R.N.; Vandana Newman R.N.  Relief Circulator: Anita A Reyes, R.N.  Scrub Person: Caprice Miller    Specimens removed if any:  ID Type Source Tests Collected by Time Destination   A : Uterus, Cervix, Bilateral fallopian Tubes Other Other PATHOLOGY SPECIMEN Kaia Mcneill M.D. 9/27/2023  4:18 PM        Estimated Blood Loss: 100 ml    Findings: Normal but globular appearing uterus, normal fallopian tubes and ovaries bilaterally.     Complications: None        9/27/2023 5:45 PM Kaia Mcneill M.D.

## 2023-09-28 NOTE — PROGRESS NOTES
0710- Received report from claudette and assumed care for patient.    0830- Assessed patient, incision site and vital signs all stable. Discussed care plan with patient and answered her questions

## 2023-09-28 NOTE — OR NURSING
1738 - Pt to PACU 3 from OR.  Bedside report from anesthesiologist and RN.  Attached to monitoring, VSS, breathing is calm and unlabored on 4L oxymask. X3 incision sites dermabonded. Radha pad put in place, rhodes in place.     1745- Given pain medicine per MAR for 7/10 pain, tolerating sips of water.     1753- Given subsequent dose of pain medicine per MAR    1804- Given dilaudid IV for pain per MAR. Placed on 2L NC.     1818- Given subsequent dose of pain medicine per MAR.     1825- Called and attempted to give report to floor, RN. RN said she would call back for report.     1834- Given subsequent dose of pain medicine per MAR.     1847- Pt ready for transfer to hospital room, tried to call floor RN again. Per floor RN, charge RN would take report but charge RN said she was busy, will try to call report again.     1915- Called and gave report to Claudette, RN. Pt placed on transport.     1944- Pt transferred to hospital room in [facility] with belongings, pt's mother accompanying her, belongings sent with pt.

## 2023-09-28 NOTE — DISCHARGE INSTRUCTIONS
Please call or return if you develop :  - Fever greater than 100.4  - nausea and vomiting  - Severe pain not well controlled by pain medications  - Heavy vaginal bleeding, discharge or drainage  - Inability to have a bowel movement    No swimming, tub bathing, intercourse or heavy lifting/straining for 6-8 weeks.     [id number]  Laparoscopic Hysterectomy: Your Home Recovery  When you leave the hospital, you’ll receive directions on caring for yourself at home. Following these directions helps to make sure you have a faster recovery. It often takes about 1 week to 4 weeks to recover from laparoscopic hysterectomy. But recovery time varies for each woman.  Taking care of yourself    Follow these tips to make your recovery as safe and comfortable as possible:  To prevent constipation, eat fruits, vegetables, and whole grains. Drink plenty of water. Your healthcare provider may advise that you use a laxative or a mild stool softener.  Ask your friends and family to help with chores and errands while you recover.  Don't lift anything over 10 pounds to prevent straining your incisions.  Don't get your incisions wet until your healthcare provider says it’s OK to do so.  Don't put anything in your vagina until your provider says it’s safe to do so. This includes using tampons and douches and having sex.  Schedule follow-up visits with your provider.  When to call your healthcare provider  Call your healthcare provider if any of the following occur:  Chills or a fever of 100.4°F (38°C) or higher, or as advised  Bright red vaginal bleeding or a smelly discharge  Trouble urinating or a burning feeling during urination  Severe belly (abdominal) pain or bloating  An incision site that is red, swollen, or draining smelly fluid  Trouble breathing or fainting  Swollen painful leg

## 2023-09-28 NOTE — CARE PLAN
The patient is Watcher - Medium risk of patient condition declining or worsening    Shift Goals  Clinical Goals: pain control    Progress made toward(s) clinical / shift goals:      Problem: Extended Recovery Optimal Care  Goal: Patient will achieve/maintain normal respiratory rate/effort  Description: Target End Date:  Prior to discharge or change in level of care    Document on Assessment flowsheet    1.   Assess and monitor rate, rhythm, depth and effort of respiration  2.   Ensure continuous pulse oximetry in use  3.   Assess O2 saturation, administer/titrate oxygen as ordered  4.   Educate patient on importance of turning, coughing and deep breathing  5.   Educate patient on splinting techniques during deep breathing and coughing  6.   Encourage use of incentive spirometer (at least 5 to 10 times per hour)  7.   Position patient for maximum ventilatory efficiency  8.   Oral hygiene  9.  Alternate physical activity with rest periods  Outcome: Progressing  Goal: Alleviation or reduction of pain post surgery  Description: Target End Date:  Prior to discharge or change in level of care    1.  Pain assessed q2 hours for 24 hours, then q4 hours during use of PCA  2.  Transition to oral medications as appropriate  3.  Epidural assessment if applicable  4.  Ice to surgical site per order  Outcome: Progressing  Goal: Early mobilization post surgery  Description: Target End Date:  Prior to discharge or change in level of care    Document on Wound LDA    1.  Incision site care per provider order  2.  Dressing checks q 4 hours  3.  Keep dressing clean, dry and intact  4.  Reinforce/change dressing if saturated  5.  Leave incision site open to air once drain is removed  6.  Assess, educate and manage surgical drains  7.  Promote adequate nutrition intake to promote wound healing  Outcome: Progressing

## 2023-09-28 NOTE — DISCHARGE SUMMARY
Discharge Summary:      Vanessa Sanchez    Admit Date:   2023  Discharge Date:  2023     Admitting diagnosis:  Abnormal uterine bleeding (AUB) [N93.9]  Discharge Diagnosis: Status post TLH/BS/cysto          History:  Past Medical History:   Diagnosis Date    Anxiety     Asthma     Attention deficit hyperactivity disorder (ADHD) 2019    Dental disorder     Depression     Gynecological disorder 2008    Heavy bleeding, pain, prolonged periods.    Heart burn     Hypothyroidism 2019    Indigestion     Sleep apnea     uses c pap    Snoring      OB History    Para Term  AB Living   0 0 0 0 0 0   SAB IAB Ectopic Molar Multiple Live Births   0 0 0 0 0 0        Patient has no known allergies.  Patient Active Problem List    Diagnosis Date Noted    Abnormal uterine bleeding (AUB) 2023    History of anxiety 2022    History of borderline personality disorder 2022    Marijuana use 2022    MDD (major depressive disorder) 2022    Suicidal ideations 2022    Cigarette nicotine dependence without complication 2022    Severe episode of recurrent major depressive disorder, without psychotic features (HCC) 2022    Tobacco use disorder 2022    Elevated BP without diagnosis of hypertension 2022    Morbid obesity with BMI of 40.0-44.9, adult (HCC) 2021    Anxiety 2021    Vegan 2021    Patellofemoral syndrome of right knee 2021    Asthma 2021    Internal derangement of right knee 2021    Borderline personality disorder (HCC) 2019        Hospital Course:   28 y.o. , now s/p TLH/BS/Cysto POD #1 Patient postop course was unremarkable, with progressive advancement in diet , ambulation and toleration of oral analgesia. Patient without complaints today and desires discharge.      Vitals:    23 0300 23 0400 23 0600 23 0934   BP:   115/55 100/49   Pulse:   95 60   Resp:   18 18    Temp:   36 °C (96.8 °F) 36.8 °C (98.2 °F)   TempSrc:   Temporal Temporal   SpO2: 94% 96% 95% 94%   Weight:       Height:           Current Facility-Administered Medications   Medication Dose    buPROPion SR (Wellbutrin-SR) tablet 200 mg  200 mg    albuterol inhaler 1 Puff  1 Puff    diazePAM (Valium) tablet 5 mg  5 mg    prazosin (Minipress) capsule 2 mg  2 mg    Brexpiprazole TABS 1 Tablet  1 Tablet    Pharmacy Consult Request ...Pain Management Review 1 Each  1 Each    ondansetron (Zofran) syringe/vial injection 4 mg  4 mg    dexamethasone (Decadron) injection 4 mg  4 mg    diphenhydrAMINE (Benadryl) injection 25 mg  25 mg    haloperidol lactate (Haldol) injection 1 mg  1 mg    scopolamine (Transderm-Scop) patch 1 Patch  1 Patch    docusate sodium (Colace) capsule 100 mg  100 mg    senna-docusate (Pericolace Or Senokot S) 8.6-50 MG per tablet 1 Tablet  1 Tablet    senna-docusate (Pericolace Or Senokot S) 8.6-50 MG per tablet 1 Tablet  1 Tablet    polyethylene glycol/lytes (Miralax) PACKET 1 Packet  1 Packet    magnesium hydroxide (Milk Of Magnesia) suspension 30 mL  30 mL    bisacodyl (Dulcolax) suppository 10 mg  10 mg    sodium phosphate (Fleet) enema 133 mL  1 Each    ibuprofen (Motrin) tablet 800 mg  800 mg    Followed by    [START ON 10/2/2023] ibuprofen (Motrin) tablet 800 mg  800 mg    oxyCODONE immediate-release (Roxicodone) tablet 5 mg  5 mg    Or    oxyCODONE immediate release (Roxicodone) tablet 10 mg  10 mg    Or    morphine 4 MG/ML injection 4 mg  4 mg    acetaminophen (Tylenol) tablet 1,000 mg  1,000 mg    Followed by    [START ON 10/2/2023] acetaminophen (Tylenol) tablet 1,000 mg  1,000 mg       Exam:  Abdomen: Abdomen soft, non-tender. BS normal. No masses,  No organomegaly  Incision: dry and intact  Extremity: extremities, peripheral pulses and reflexes normal     Labs:  Recent Labs     09/26/23  1252 09/28/23  0529   WBC 5.7 9.4   RBC 4.97 4.61   HEMOGLOBIN 14.9 13.9   HEMATOCRIT 43.9 41.2    MCV 88.3 89.4   MCH 30.0 30.2   MCHC 33.9 33.7   RDW 41.3 41.1   PLATELETCT 281 276   MPV 10.3 10.1        Activity:   Discharge to home  Pelvic Rest x 6 weeks    Assessment:  Doing well  Discharge Assessment: [place] home    Follow up: .  Dr. Mcneill in 2 weeks at [facility]     Discharge Meds:   Current Outpatient Medications   Medication Sig Dispense Refill    ibuprofen (MOTRIN) 800 MG Tab Take 1 Tablet by mouth 3 times a day. 30 Tablet 2    senna-docusate (PERICOLACE OR SENOKOT S) 8.6-50 MG Tab Take 1 Tablet by mouth every evening. 30 Tablet 0    oxyCODONE-acetaminophen (PERCOCET) 5-325 MG Tab Take 1 Tablet by mouth every four hours as needed for Severe Pain for up to 5 days. 15 Tablet 0       Kaia Mcneill M.D.

## 2023-10-16 ENCOUNTER — GYNECOLOGY VISIT (OUTPATIENT)
Dept: OBGYN | Facility: CLINIC | Age: 28
End: 2023-10-16
Payer: MEDICAID

## 2023-10-16 VITALS — DIASTOLIC BLOOD PRESSURE: 80 MMHG | BODY MASS INDEX: 49.34 KG/M2 | SYSTOLIC BLOOD PRESSURE: 124 MMHG | WEIGHT: 293 LBS

## 2023-10-16 DIAGNOSIS — Z48.89 ENCOUNTER FOR POSTOPERATIVE CARE: ICD-10-CM

## 2023-10-16 PROCEDURE — 99024 POSTOP FOLLOW-UP VISIT: CPT | Performed by: OBSTETRICS & GYNECOLOGY

## 2023-10-16 PROCEDURE — 3079F DIAST BP 80-89 MM HG: CPT | Performed by: OBSTETRICS & GYNECOLOGY

## 2023-10-16 PROCEDURE — 3074F SYST BP LT 130 MM HG: CPT | Performed by: OBSTETRICS & GYNECOLOGY

## 2023-10-16 ASSESSMENT — FIBROSIS 4 INDEX: FIB4 SCORE: 0.39

## 2023-10-16 NOTE — PROGRESS NOTES
Postoperative Follow Up Visit    Vanessa Sanchez is a 28 y.o. female    Chief complaint    No chief complaint on file.      S/p TLH/BS/cysto on 9/27/23 for AUB presenting for postop check    COMPLAINTS:    Overall feeling well. No fevers/chills, no N/V, no abdominal or pelvic pain. No constipation/diarrhea. Voiding well. No vaginal bleeding or foul discharge. Reports occasional scant pink drainage. Wondering if orgasm-ing externally is safe. She also states she has a small piece of suture 'poking' out of her left incision.     Pathology discussed.       OBJECTIVE:    /80 (BP Location: Left arm, Patient Position: Sitting)   Wt (!) 315 lb   BMI 49.34 kg/m²     General: No acute distress, well nourished, alert.     ABDOMEN: Soft nondistended, nontender, no peritoneal signs, no masses.     INCISION: Clean, dry, intact, no drainage, erythema or separation. Suture protruding < 1 mm from left lower port incision, removed with forceps.     Pathology    FINAL DIAGNOSIS:     A. Uterus, cervix, and bilateral fallopian tubes:          Cervix: Negative for dysplasia and carcinoma          Endometrium: Proliferative endometrium, negative for atypical           hyperplasia and malignancy          Myometrium: Fundal fissure extending nearly transmurally in           association with focal hemorrhage, negative for malignancy          Bilateral fallopian tubes: Two fimbriated fallopian tubes,           negative for malignancy       A/P    1. Encounter for postoperative care        S/p TLH/BS/cysto on 9/29/23 for AUB, doing well.     Advised to avoid all sexual activity, heavy lifting, swimming or tub bathing until final postop check.     Follow up in 4 weeks for final postop check.     Kaia Mcneill M.D.    Obstetrics and Gynecology    10/16/81348:33 PM

## 2023-11-02 ENCOUNTER — GYNECOLOGY VISIT (OUTPATIENT)
Dept: OBGYN | Facility: CLINIC | Age: 28
End: 2023-11-02
Payer: MEDICAID

## 2023-11-02 VITALS — WEIGHT: 293 LBS | DIASTOLIC BLOOD PRESSURE: 80 MMHG | SYSTOLIC BLOOD PRESSURE: 117 MMHG | BODY MASS INDEX: 49.84 KG/M2

## 2023-11-02 DIAGNOSIS — Z48.89 ENCOUNTER FOR POSTOPERATIVE CARE: ICD-10-CM

## 2023-11-02 ASSESSMENT — FIBROSIS 4 INDEX: FIB4 SCORE: 0.39

## 2023-11-02 NOTE — PROGRESS NOTES
Postoperative Follow Up Visit    Vanessa Sanchez is a 28 y.o. female    Chief complaint    No chief complaint on file.      S/p TLH/BS/cysto on 9/27/23 for AUB presenting for final postop check    COMPLAINTS:    ***      OBJECTIVE:    There were no vitals taken for this visit.    General: No acute distress, well nourished, alert.     ABDOMEN: Soft nondistended, nontender, no peritoneal signs, no masses.     INCISION: Clean, dry, intact, no drainage, erythema or separation.    A/P    No diagnosis found.    S/p TLH/BS/cysto on 9/27/23 for AUB doing well.     May return to all activity.     RTC in 1 year or PRN    Kaia Mcneill M.D.    Obstetrics and Gynecology    11/2/20238:43 AM

## 2023-11-14 ENCOUNTER — TELEPHONE (OUTPATIENT)
Dept: OBGYN | Facility: CLINIC | Age: 28
End: 2023-11-14
Payer: MEDICAID

## 2023-11-14 DIAGNOSIS — B37.31 VAGINAL YEAST INFECTION: ICD-10-CM

## 2023-11-14 RX ORDER — FLUCONAZOLE 150 MG/1
150 TABLET ORAL ONCE
Qty: 1 TABLET | Refills: 0 | Status: SHIPPED | OUTPATIENT
Start: 2023-11-14 | End: 2023-11-14

## 2023-11-14 NOTE — TELEPHONE ENCOUNTER
PT called in stating she recently had surgery with Dr. Mcneill and believes she is experiencing a yeast infection , she has some vaginal itching and irritation and would like an oral prescription for this. Pharmacy Confirmed. Encounter routed to Dr. Mcneill and MICHELLE Muir.

## 2023-11-21 ENCOUNTER — GYNECOLOGY VISIT (OUTPATIENT)
Dept: OBGYN | Facility: CLINIC | Age: 28
End: 2023-11-21
Payer: MEDICAID

## 2023-11-21 VITALS — DIASTOLIC BLOOD PRESSURE: 69 MMHG | SYSTOLIC BLOOD PRESSURE: 132 MMHG | WEIGHT: 293 LBS | BODY MASS INDEX: 51.06 KG/M2

## 2023-11-21 DIAGNOSIS — Z48.89 ENCOUNTER FOR POSTOPERATIVE CARE: ICD-10-CM

## 2023-11-21 PROCEDURE — 3075F SYST BP GE 130 - 139MM HG: CPT | Performed by: OBSTETRICS & GYNECOLOGY

## 2023-11-21 PROCEDURE — 3078F DIAST BP <80 MM HG: CPT | Performed by: OBSTETRICS & GYNECOLOGY

## 2023-11-21 PROCEDURE — 99024 POSTOP FOLLOW-UP VISIT: CPT | Performed by: OBSTETRICS & GYNECOLOGY

## 2023-11-21 ASSESSMENT — FIBROSIS 4 INDEX: FIB4 SCORE: 0.39

## 2023-11-21 NOTE — PROGRESS NOTES
Postoperative Follow Up Visit    Vanessa Sanchez is a 28 y.o. female    Chief complaint    Chief Complaint   Patient presents with    Gynecologic Exam     Post-op Visist S/P MISSY medrano 9/27/2023       S/p TLH/BS/cysto on 9/27/23 for AUB, dysmenorrhea presenting for final postop check    COMPLAINTS:    Doing well. Did have yeast infection resolved after diflucan few days ago. No fevers/chills, no N/V, no pain. No constipation or diarrhea. Voiding well. Had one small dark brown clot a few days ago, but no vaginal bleeding otherwise.       OBJECTIVE:    /69 (BP Location: Right arm, Patient Position: Sitting)   Wt (!) 326 lb   BMI 51.06 kg/m²     General: No acute distress, well nourished, alert.     ABDOMEN: Soft obese nondistended, nontender, no peritoneal signs, no masses.     INCISION: Clean, dry, intact, no drainage, erythema or separation.    : normal vulva, vagina. Vaginal cuff well healed, no foul odor, discharge, blood. Bimanual exam- no defects masses or significant tenderness.     Pathology    FINAL DIAGNOSIS:     A. Uterus, cervix, and bilateral fallopian tubes:          Cervix: Negative for dysplasia and carcinoma          Endometrium: Proliferative endometrium, negative for atypical           hyperplasia and malignancy          Myometrium: Fundal fissure extending nearly transmurally in           association with focal hemorrhage, negative for malignancy          Bilateral fallopian tubes: Two fimbriated fallopian tubes,           negative for malignancy     A/P    1. Encounter for postoperative care        S/p TLH/BS/cysto on 9/27/23 for AUB, dysmenorrhea  doing well.     Doing well . Well healed. May return to normal activities.     RTC in 1 year or PRN.    Kaia Mcneill M.D.    Obstetrics and Gynecology    11/21/20232:19 PM

## 2023-12-04 ENCOUNTER — HOSPITAL ENCOUNTER (OUTPATIENT)
Facility: MEDICAL CENTER | Age: 28
End: 2023-12-04
Payer: MEDICAID

## 2023-12-04 ENCOUNTER — OFFICE VISIT (OUTPATIENT)
Dept: URGENT CARE | Facility: CLINIC | Age: 28
End: 2023-12-04
Payer: MEDICAID

## 2023-12-04 VITALS
HEART RATE: 86 BPM | SYSTOLIC BLOOD PRESSURE: 116 MMHG | WEIGHT: 293 LBS | HEIGHT: 67 IN | TEMPERATURE: 98.9 F | DIASTOLIC BLOOD PRESSURE: 74 MMHG | OXYGEN SATURATION: 95 % | BODY MASS INDEX: 45.99 KG/M2 | RESPIRATION RATE: 16 BRPM

## 2023-12-04 DIAGNOSIS — H66.001 NON-RECURRENT ACUTE SUPPURATIVE OTITIS MEDIA OF RIGHT EAR WITHOUT SPONTANEOUS RUPTURE OF TYMPANIC MEMBRANE: ICD-10-CM

## 2023-12-04 DIAGNOSIS — N76.0 ACUTE VAGINITIS: ICD-10-CM

## 2023-12-04 LAB
APPEARANCE UR: CLEAR
BILIRUB UR STRIP-MCNC: NEGATIVE MG/DL
COLOR UR AUTO: NORMAL
FORWARD REASON: SPWHY: NORMAL
FORWARDED TO LAB: SPWHR: NORMAL
GLUCOSE UR STRIP.AUTO-MCNC: NEGATIVE MG/DL
KETONES UR STRIP.AUTO-MCNC: NORMAL MG/DL
LEUKOCYTE ESTERASE UR QL STRIP.AUTO: NORMAL
NITRITE UR QL STRIP.AUTO: NEGATIVE
PH UR STRIP.AUTO: 7 [PH] (ref 5–8)
PROT UR QL STRIP: NEGATIVE MG/DL
RBC UR QL AUTO: NEGATIVE
SP GR UR STRIP.AUTO: 1.02
SPECIMEN SENT: SPWT1: NORMAL
UROBILINOGEN UR STRIP-MCNC: 0.2 MG/DL

## 2023-12-04 PROCEDURE — 99213 OFFICE O/P EST LOW 20 MIN: CPT | Mod: 25

## 2023-12-04 PROCEDURE — 81002 URINALYSIS NONAUTO W/O SCOPE: CPT

## 2023-12-04 PROCEDURE — 3078F DIAST BP <80 MM HG: CPT

## 2023-12-04 PROCEDURE — 3074F SYST BP LT 130 MM HG: CPT

## 2023-12-04 RX ORDER — AMOXICILLIN AND CLAVULANATE POTASSIUM 250; 62.5 MG/5ML; MG/5ML
250 POWDER, FOR SUSPENSION ORAL 2 TIMES DAILY
Qty: 70 ML | Refills: 0 | Status: SHIPPED | OUTPATIENT
Start: 2023-12-04 | End: 2023-12-11

## 2023-12-04 RX ORDER — FLUCONAZOLE 150 MG/1
TABLET ORAL
Qty: 2 TABLET | Refills: 0 | Status: SHIPPED | OUTPATIENT
Start: 2023-12-04

## 2023-12-04 ASSESSMENT — FIBROSIS 4 INDEX: FIB4 SCORE: 0.38

## 2023-12-05 ENCOUNTER — TELEPHONE (OUTPATIENT)
Dept: OBGYN | Facility: CLINIC | Age: 28
End: 2023-12-05
Payer: MEDICAID

## 2023-12-05 NOTE — PROGRESS NOTES
Subjective:   Vanessa Sanchez is a 28 y.o. female who presents for Otalgia (BL ear fullness. ) and Vaginitis (Currently on abx, hx of yeast infections when on abx, x 1-2 days)      HPI:    Patient presents to urgent care with concerns of right ear fullness and concerns of yeast infection.  States she has history of cerumen impaction  Endorses pain radiating to her jaw. Pain is described as fullness. States she cannot hear well from the right ear.  Denies pain with movement of her ear  Denies drainage of her ear  Reports preceding URI about week ago  Denies fever, chills    Secondary to the above, patient is also concerned she has yeast infection  States she has completed 2 courses of antibiotics for dental infection and she typically develops yeast infection with antibiotic use.   Reports thick white vaginal discharge  Denies concerns of stds  States she has mild burning and itching sensation  Denies odor  Denies flank pain, abdominal pain      ROS As above in HPI    Medications:    Current Outpatient Medications on File Prior to Visit   Medication Sig Dispense Refill    Cyanocobalamin (VITAMIN B-12 PO) Take  by mouth.      diazePAM (VALIUM) 5 MG Tab Take 5 mg by mouth every 6 hours as needed for Anxiety.      Esketamine HCl (SPRAVATO, 84 MG DOSE, NA) Administer  into affected nostril(S).      Vilazodone HCl 40 MG Tab Take 40 mg by mouth every morning.      prazosin (MINIPRESS) 2 MG Cap Take 2 mg by mouth every evening.      albuterol 108 (90 Base) MCG/ACT Aero Soln inhalation aerosol Inhale 1 Puff as needed. 8.5 g 3    buPROPion (WELLBUTRIN XL) 300 MG XL tablet Take 450 mg by mouth every morning.      ibuprofen (MOTRIN) 800 MG Tab Take 1 Tablet by mouth 3 times a day. (Patient not taking: Reported on 11/2/2023) 30 Tablet 2    dicyclomine (BENTYL) 20 MG Tab Take 1 Tablet by mouth 4 times a day as needed (Abdominal cramping and diarrhea). (Patient not taking: Reported on 11/2/2023) 60 Tablet 0    REXULTI 1 MG Tab  Take 1 Tablet by mouth at bedtime.       No current facility-administered medications on file prior to visit.        Allergies:   Patient has no known allergies.    Problem List:   Patient Active Problem List   Diagnosis    Borderline personality disorder (HCC)    Patellofemoral syndrome of right knee    Asthma    Morbid obesity with BMI of 40.0-44.9, adult ([facility])    Anxiety    Vegan    Severe episode of recurrent major depressive disorder, without psychotic features (HCC)    Tobacco use disorder    Elevated BP without diagnosis of hypertension    History of anxiety    History of borderline personality disorder    Marijuana use    MDD (major depressive disorder)    Suicidal ideations    Cigarette nicotine dependence without complication    Internal derangement of right knee    Abnormal uterine bleeding (AUB)        Surgical History:  Past Surgical History:   Procedure Laterality Date    HYSTERECTOMY LAPAROSCOPY N/A 2023    Procedure: TOTAL LAPAROSCOPIC HYSTERECTOMY, BILATERAL SALPINGECTOMY, CYSTOSCOPY;  Surgeon: Kaia Mcneill M.D.;  Location: SURGERY SAME DAY [facility];  Service: Gynecology    HEMORRHOIDECTOMY  2019    Procedure: HEMORRHOIDECTOMY;  Surgeon: Manolo Shoemaker M.D.;  Location: SURGERY [facility];  Service: General    IA REMOVE INTRAUTERINE DEVICE  2019    Procedure: REMOVAL, INTRAUTERINE DEVICE, HYSTEROSCOPY;  Surgeon: Jamil Rick M.D.;  Location: SURGERY SAME DAY [facility];  Service: Obstetrics    DILATION AND CURETTAGE N/A 2019    Procedure: DILATION AND CURETTAGE;  Surgeon: Jamil Rick M.D.;  Location: SURGERY SAME DAY [facility];  Service: Obstetrics    TONSILLECTOMY AND ADENOIDECTOMY  2013    OTHER      Turbinate reduction       Past Social Hx:   Social History     Tobacco Use    Smoking status: Former     Current packs/day: 0.00     Types: Cigarettes     Quit date: 2023     Years since quittin.2    Smokeless tobacco: Never    Tobacco comments:      "pt has smoked for 10 years   Vaping Use    Vaping Use: Former   Substance Use Topics    Alcohol use: Not Currently     Comment: rare    Drug use: Not Currently     Types: Inhaled          Problem list, medications, and allergies reviewed by myself today in Epic.     Objective:     /74   Pulse 86   Temp 37.2 °C (98.9 °F)   Resp 16   Ht 1.702 m (5' 7\")   Wt (!) 148 kg (326 lb)   SpO2 95%   BMI 51.06 kg/m²     Physical Exam  Vitals and nursing note reviewed.   Constitutional:       General: She is not in acute distress.     Appearance: Normal appearance. She is not ill-appearing.   HENT:      Head: Normocephalic.      Right Ear: Ear canal normal. No swelling or tenderness. A middle ear effusion is present. There is no impacted cerumen. No mastoid tenderness. Tympanic membrane is erythematous and bulging.      Left Ear: Tympanic membrane and ear canal normal.      Nose: Mucosal edema, congestion and rhinorrhea present. Rhinorrhea is clear and purulent.      Right Sinus: Maxillary sinus tenderness present. No frontal sinus tenderness.      Left Sinus: No maxillary sinus tenderness or frontal sinus tenderness.      Mouth/Throat:      Mouth: Mucous membranes are moist.      Pharynx: Oropharynx is clear. Uvula midline. Posterior oropharyngeal erythema (Mod PND) present. No pharyngeal swelling or oropharyngeal exudate.      Tonsils: No tonsillar exudate.   Cardiovascular:      Rate and Rhythm: Normal rate and regular rhythm.      Heart sounds: Normal heart sounds. No murmur heard.     No friction rub. No gallop.   Pulmonary:      Effort: Pulmonary effort is normal. No respiratory distress.      Breath sounds: Normal breath sounds. No stridor. No wheezing, rhonchi or rales.   Chest:      Chest wall: No tenderness.   Abdominal:      General: Bowel sounds are normal.      Palpations: Abdomen is soft.   Genitourinary:     Comments:  exam deferred  Musculoskeletal:      Cervical back: No rigidity or tenderness. "   Lymphadenopathy:      Cervical: No cervical adenopathy.   Skin:     General: Skin is warm and dry.      Capillary Refill: Capillary refill takes less than 2 seconds.      Findings: No rash.   Neurological:      Mental Status: She is alert and oriented to person, place, and time.         Assessment/Plan:       Results for orders placed or performed in visit on 12/04/23   POCT Urinalysis   Result Value Ref Range    POC Color dark yellow Negative    POC Appearance clear Negative    POC Glucose negative Negative mg/dL    POC Bilirubin negative Negative mg/dL    POC Ketones trace Negative mg/dL    POC Specific Gravity 1.025 <1.005 - >1.030    POC Blood negative Negative    POC Urine PH 7.0 5.0 - 8.0    POC Protein negative Negative mg/dL    POC Urobiligen 0.2 Negative (0.2) mg/dL    POC Nitrites negative Negative    POC Leukocyte Esterase small Negative       Diagnosis and associated orders:   1. Acute vaginitis  - fluconazole (DIFLUCAN) 150 MG tablet; Take one tablet orally for yeast infection, if symptoms persist, may repeat treatment after 72 hours.  Dispense: 2 Tablet; Refill: 0  - VAGINAL PATHOGENS DNA PANEL; Future  - POCT Urinalysis  - POCT Pregnancy    2. Non-recurrent acute suppurative otitis media of right ear without spontaneous rupture of tympanic membrane  - amoxicillin-clavulanate (AUGMENTIN) 250-62.5 MG/5ML Recon Susp suspension; Take 5 mL by mouth 2 times a day for 7 days.  Dispense: 70 mL; Refill: 0        Comments/MDM:     UA: +LE, -nitrites, -blood  Neg hcg  Vaginal pathogen panel obtained, patient performed self swab  Declined STD testing    Increase clear fluid intake, rest.  No cerumen impaction  Antihistamines, Flonase  Return to UC if not improved over the next 2-3 days   Follow-up with primary care advised.         Please note that this dictation was created using voice recognition software. I have made a reasonable attempt to correct obvious errors, but I expect that there are errors of  grammar and possibly content that I did not discover before finalizing the note.    This note was electronically signed by JUAN Sanchez

## 2023-12-05 NOTE — TELEPHONE ENCOUNTER
12/05/23 11:58 AM    Pt called and wanted to know if her edema and wt gain would be a complication from her surgery in September. Put pt on hold to consult with Dr. Mcneill (her surgeon) Per Dr. Mcneill the wt gain can be normal, the edema she would need to see her PCP regarding this. Dr. Mcneill suggested an appt to discuss. When I came back to pt on phone she had hung up.

## 2023-12-10 ENCOUNTER — OFFICE VISIT (OUTPATIENT)
Dept: URGENT CARE | Facility: CLINIC | Age: 28
End: 2023-12-10
Payer: MEDICAID

## 2023-12-10 VITALS
WEIGHT: 293 LBS | SYSTOLIC BLOOD PRESSURE: 110 MMHG | BODY MASS INDEX: 45.99 KG/M2 | DIASTOLIC BLOOD PRESSURE: 68 MMHG | HEART RATE: 90 BPM | OXYGEN SATURATION: 98 % | TEMPERATURE: 97.4 F | RESPIRATION RATE: 16 BRPM | HEIGHT: 67 IN

## 2023-12-10 DIAGNOSIS — H69.91 EUSTACHIAN TUBE DYSFUNCTION, RIGHT: ICD-10-CM

## 2023-12-10 DIAGNOSIS — H66.001 NON-RECURRENT ACUTE SUPPURATIVE OTITIS MEDIA OF RIGHT EAR WITHOUT SPONTANEOUS RUPTURE OF TYMPANIC MEMBRANE: ICD-10-CM

## 2023-12-10 PROCEDURE — 3078F DIAST BP <80 MM HG: CPT

## 2023-12-10 PROCEDURE — 3074F SYST BP LT 130 MM HG: CPT

## 2023-12-10 PROCEDURE — 99213 OFFICE O/P EST LOW 20 MIN: CPT

## 2023-12-10 RX ORDER — PREDNISONE 20 MG/1
40 TABLET ORAL DAILY
Qty: 10 TABLET | Refills: 0 | Status: SHIPPED | OUTPATIENT
Start: 2023-12-10 | End: 2023-12-15

## 2023-12-10 RX ORDER — AMOXICILLIN AND CLAVULANATE POTASSIUM 875; 125 MG/1; MG/1
1 TABLET, FILM COATED ORAL 2 TIMES DAILY
Qty: 6 TABLET | Refills: 0 | Status: SHIPPED | OUTPATIENT
Start: 2023-12-10 | End: 2023-12-13

## 2023-12-10 ASSESSMENT — ENCOUNTER SYMPTOMS
CHILLS: 0
FEVER: 0
DIAPHORESIS: 0
WEIGHT LOSS: 0

## 2023-12-10 ASSESSMENT — FIBROSIS 4 INDEX: FIB4 SCORE: 0.38

## 2023-12-10 NOTE — PROGRESS NOTES
Subjective:   Vanessa Sanchez is a 28 y.o. female who presents for Otalgia (Completed treatment for ear infection. Did not resolve )      HPI: This is a 28-year-old female patient who presents today for continued right ear fullness.  This is a new problem.  Patient recently seen on 12\4\23 for same.  She was diagnosed with nonrecurrent acute supratip otitis media of her right ear and placed on 7-day course of Augmentin.  Patient reports worsening in her symptoms.  She reports intermittent pain to her right ear and muffled hearing.  She denies fevers.  No drainage from her ear.  She reports that she has taken antibiotic as prescribed along with 2 days of Afrin without any improvement in her symptoms.      Review of Systems   Constitutional:  Negative for chills, diaphoresis, fever, malaise/fatigue and weight loss.   HENT:  Positive for ear pain. Negative for ear discharge.         + Muffled hearing       Medications:    Current Outpatient Medications on File Prior to Visit   Medication Sig Dispense Refill    fluconazole (DIFLUCAN) 150 MG tablet Take one tablet orally for yeast infection, if symptoms persist, may repeat treatment after 72 hours. 2 Tablet 0    amoxicillin-clavulanate (AUGMENTIN) 250-62.5 MG/5ML Recon Susp suspension Take 5 mL by mouth 2 times a day for 7 days. 70 mL 0    ibuprofen (MOTRIN) 800 MG Tab Take 1 Tablet by mouth 3 times a day. 30 Tablet 2    dicyclomine (BENTYL) 20 MG Tab Take 1 Tablet by mouth 4 times a day as needed (Abdominal cramping and diarrhea). 60 Tablet 0    Cyanocobalamin (VITAMIN B-12 PO) Take  by mouth.      diazePAM (VALIUM) 5 MG Tab Take 5 mg by mouth every 6 hours as needed for Anxiety.      Esketamine HCl (SPRAVATO, 84 MG DOSE, NA) Administer  into affected nostril(S).      Vilazodone HCl 40 MG Tab Take 40 mg by mouth every morning.      prazosin (MINIPRESS) 2 MG Cap Take 2 mg by mouth every evening.      albuterol 108 (90 Base) MCG/ACT Aero Soln inhalation aerosol Inhale  1 Puff as needed. 8.5 g 3    buPROPion (WELLBUTRIN XL) 300 MG XL tablet Take 450 mg by mouth every morning.      REXULTI 1 MG Tab Take 1 Tablet by mouth at bedtime.       No current facility-administered medications on file prior to visit.        Allergies:   Patient has no known allergies.    Problem List:   Patient Active Problem List   Diagnosis    Borderline personality disorder (HCC)    Patellofemoral syndrome of right knee    Asthma    Morbid obesity with BMI of 40.0-44.9, adult ([facility])    Anxiety    Vegan    Severe episode of recurrent major depressive disorder, without psychotic features (HCC)    Tobacco use disorder    Elevated BP without diagnosis of hypertension    History of anxiety    History of borderline personality disorder    Marijuana use    MDD (major depressive disorder)    Suicidal ideations    Cigarette nicotine dependence without complication    Internal derangement of right knee    Abnormal uterine bleeding (AUB)        Surgical History:  Past Surgical History:   Procedure Laterality Date    HYSTERECTOMY LAPAROSCOPY N/A 9/27/2023    Procedure: TOTAL LAPAROSCOPIC HYSTERECTOMY, BILATERAL SALPINGECTOMY, CYSTOSCOPY;  Surgeon: Kaia Mcneill M.D.;  Location: SURGERY SAME DAY [facility];  Service: Gynecology    HEMORRHOIDECTOMY  12/8/2019    Procedure: HEMORRHOIDECTOMY;  Surgeon: Manolo Shoemaker M.D.;  Location: SURGERY [facility];  Service: General    [place] REMOVE INTRAUTERINE DEVICE  7/29/2019    Procedure: REMOVAL, INTRAUTERINE DEVICE, HYSTEROSCOPY;  Surgeon: Jamil Rick M.D.;  Location: SURGERY SAME DAY [facility];  Service: Obstetrics    DILATION AND CURETTAGE N/A 7/29/2019    Procedure: DILATION AND CURETTAGE;  Surgeon: Jamil Rick M.D.;  Location: SURGERY SAME DAY [facility];  Service: Obstetrics    TONSILLECTOMY AND ADENOIDECTOMY  2013    OTHER      Turbinate reduction       Past Social Hx:   Social History     Tobacco Use    Smoking status: Former     Current packs/day: 0.00  "    Types: Cigarettes     Quit date: 2023     Years since quittin.2    Smokeless tobacco: Never    Tobacco comments:     pt has smoked for 10 years   Vaping Use    Vaping Use: Former   Substance Use Topics    Alcohol use: Not Currently     Comment: rare    Drug use: Not Currently     Types: Inhaled          Problem list, medications, and allergies reviewed by myself today in Epic.     Objective:     /68 (BP Location: Left arm, Patient Position: Sitting, BP Cuff Size: Adult)   Pulse 90   Temp 36.3 °C (97.4 °F) (Temporal)   Resp 16   Ht 1.702 m (5' 7\")   Wt (!) 152 kg (335 lb)   SpO2 98%   BMI 52.47 kg/m²     Physical Exam  Vitals and nursing note reviewed.   Constitutional:       General: She is not in acute distress.     Appearance: Normal appearance. She is obese. She is not ill-appearing, toxic-appearing or diaphoretic.   HENT:      Head: Normocephalic and atraumatic.      Right Ear: Tenderness present. A middle ear effusion is present. Tympanic membrane is erythematous.      Left Ear: Tympanic membrane, ear canal and external ear normal. There is no impacted cerumen.      Nose: Nose normal. No congestion or rhinorrhea.      Mouth/Throat:      Mouth: Mucous membranes are moist.      Pharynx: Oropharynx is clear. No oropharyngeal exudate or posterior oropharyngeal erythema.   Cardiovascular:      Rate and Rhythm: Normal rate and regular rhythm.      Pulses: Normal pulses.      Heart sounds: Normal heart sounds. No murmur heard.     No friction rub. No gallop.   Pulmonary:      Effort: Pulmonary effort is normal. No respiratory distress.      Breath sounds: Normal breath sounds. No stridor. No wheezing, rhonchi or rales.   Chest:      Chest wall: No tenderness.   Musculoskeletal:      Cervical back: Neck supple. No tenderness.   Lymphadenopathy:      Cervical: No cervical adenopathy.   Skin:     General: Skin is warm and dry.      Capillary Refill: Capillary refill takes less than 2 seconds. "   Neurological:      General: No focal deficit present.      Mental Status: She is alert and oriented to person, place, and time. Mental status is at baseline.      Cranial Nerves: No cranial nerve deficit.      Motor: No weakness.      Gait: Gait normal.   Psychiatric:         Mood and Affect: Mood normal.         Behavior: Behavior normal.         Thought Content: Thought content normal.         Judgment: Judgment normal.         Assessment/Plan:     Diagnosis and associated orders:   1. Non-recurrent acute suppurative otitis media of right ear without spontaneous rupture of tympanic membrane  amoxicillin-clavulanate (AUGMENTIN) 875-125 MG Tab      2. Eustachian tube dysfunction, right  predniSONE (DELTASONE) 20 MG Tab             Comments/MDM:   Pt is clinically stable at today's acute urgent care visit.  No acute distress noted. Appropriate for outpatient management at this time.     Acute problem.  Vital signs are stable in clinic today.  Physical exam findings are consistent with acute suppurative otitis media of her right ear.  There is suspicion of a eustachian tube dysfunction.  Antibiotics will be extended for a total course of 10 days.  Patient will also be prescribed prednisone.  Advised patient to begin taking antibiotic and steroid as prescribed, along with daily nondrowsy antihistamine.  Patient is to return to  or go to ER for any new or worsening signs or symptoms, and follow with with PCP for recheck. Patient is agreeable with plan of care and verbalizes good understanding.             Discussed DDx, management options (risks,benefits, and alternatives to planned treatment), natural progression and supportive care.  Expressed understanding and the treatment plan was agreed upon. Questions were encouraged and answered   Return to urgent care prn if new or worsening sx or if there is no improvement in condition prn.    Educated in Red flags and indications to immediately call 911 or present to the  Emergency Department.   Advised the patient to follow-up with the primary care physician for recheck, reevaluation, and consideration of further management.    I personally reviewed prior external notes and test results pertinent to today's visit.  I have independently reviewed and interpreted all diagnostics ordered during this urgent care acute visit.       Please note that this dictation was created using voice recognition software. I have made a reasonable attempt to correct obvious errors, but I expect that there are errors of grammar and possibly content that I did not discover before finalizing the note.    This note was electronically signed by JUAN Isabel

## 2023-12-27 ENCOUNTER — APPOINTMENT (OUTPATIENT)
Dept: URGENT CARE | Facility: CLINIC | Age: 28
End: 2023-12-27
Payer: MEDICAID

## 2024-02-14 ENCOUNTER — APPOINTMENT (OUTPATIENT)
Dept: RADIOLOGY | Facility: IMAGING CENTER | Age: 29
End: 2024-02-14
Attending: PHYSICIAN ASSISTANT
Payer: MEDICAID

## 2024-02-14 ENCOUNTER — OFFICE VISIT (OUTPATIENT)
Dept: URGENT CARE | Facility: CLINIC | Age: 29
End: 2024-02-14
Payer: MEDICAID

## 2024-02-14 VITALS
RESPIRATION RATE: 16 BRPM | HEIGHT: 66 IN | BODY MASS INDEX: 47.09 KG/M2 | SYSTOLIC BLOOD PRESSURE: 136 MMHG | OXYGEN SATURATION: 96 % | HEART RATE: 91 BPM | DIASTOLIC BLOOD PRESSURE: 82 MMHG | WEIGHT: 293 LBS | TEMPERATURE: 99.9 F

## 2024-02-14 DIAGNOSIS — S96.912A STRAIN OF LEFT FOOT, INITIAL ENCOUNTER: ICD-10-CM

## 2024-02-14 DIAGNOSIS — S99.922A INJURY OF LEFT FOOT, INITIAL ENCOUNTER: ICD-10-CM

## 2024-02-14 PROCEDURE — 73630 X-RAY EXAM OF FOOT: CPT | Mod: TC,LT | Performed by: PHYSICIAN ASSISTANT

## 2024-02-14 PROCEDURE — 3075F SYST BP GE 130 - 139MM HG: CPT | Performed by: PHYSICIAN ASSISTANT

## 2024-02-14 PROCEDURE — 3079F DIAST BP 80-89 MM HG: CPT | Performed by: PHYSICIAN ASSISTANT

## 2024-02-14 PROCEDURE — 99214 OFFICE O/P EST MOD 30 MIN: CPT | Performed by: PHYSICIAN ASSISTANT

## 2024-02-14 ASSESSMENT — ENCOUNTER SYMPTOMS
NEUROLOGICAL NEGATIVE: 1
CONSTITUTIONAL NEGATIVE: 1
FALLS: 0

## 2024-02-14 ASSESSMENT — FIBROSIS 4 INDEX: FIB4 SCORE: 0.39

## 2024-02-15 NOTE — PROGRESS NOTES
Subjective     Vanessa Sanchez is a very pleasant 29 y.o. female who presents with Foot Injury (Per pt sprained (L) foot)            HPI  Patient presents with foot pain which started earlier today after a long walk.  There is pain and swelling at the base of the fifth metatarsal.  There is pain with weightbearing and decreased range of motion.  There is no injury fall or precipitating event.  However, she notes that this was a much longer activity than she is used to as she is normally sedentary.  No previous injuries.  She has tried ibuprofen with only limited relief.  No numbness tingling or weakness      PMH:  has a past medical history of Anxiety, Asthma, Attention deficit hyperactivity disorder (ADHD) (06/27/2019), Dental disorder, Depression, Gynecological disorder (2008), Heart burn, Hypothyroidism (06/27/2019), Indigestion, Sleep apnea, and Snoring.    She has no past medical history of Addisons disease ([facility]), Adrenal disorder ([facility]), Allergy, Anemia, Arrhythmia, Arthritis, Blood transfusion without reported diagnosis, Cancer ([facility]), Cataract, CHF (congestive heart failure) ([facility]), Clotting disorder ([facility]), COPD (chronic obstructive pulmonary disease) ([facility]), Cushings syndrome ([facility]), Diabetes ([facility]), Diabetic neuropathy ([facility]), GERD (gastroesophageal reflux disease), Glaucoma, Goiter, Head ache, Heart attack ([facility]), Heart murmur, HIV (human immunodeficiency virus infection) ([facility]), Hyperlipidemia, Hypertension, IBD (inflammatory bowel disease), Kidney disease, Meningitis, Migraine, Muscle disorder, Osteoporosis, Parathyroid disorder ([facility]), Pituitary disease ([facility]), Pulmonary emphysema ([facility]), Seizure ([facility]), Sickle cell disease ([facility]), Stroke ([facility]), Substance abuse ([facility]), or Tuberculosis.  MEDS:   Current Outpatient Medications:     fluconazole (DIFLUCAN) 150 MG tablet, Take one tablet orally for yeast infection, if symptoms persist, may repeat treatment after 72 hours., Disp: 2 Tablet, Rfl: 0    dicyclomine  (BENTYL) 20 MG Tab, Take 1 Tablet by mouth 4 times a day as needed (Abdominal cramping and diarrhea)., Disp: 60 Tablet, Rfl: 0    Cyanocobalamin (VITAMIN B-12 PO), Take  by mouth., Disp: , Rfl:     diazePAM (VALIUM) 5 MG Tab, Take 5 mg by mouth every 6 hours as needed for Anxiety., Disp: , Rfl:     Esketamine HCl (SPRAVATO, 84 MG DOSE, NA), Administer  into affected nostril(S)., Disp: , Rfl:     Vilazodone HCl 40 MG Tab, Take 40 mg by mouth every morning., Disp: , Rfl:     prazosin (MINIPRESS) 2 MG Cap, Take 2 mg by mouth every evening., Disp: , Rfl:     albuterol 108 (90 Base) MCG/ACT Aero Soln inhalation aerosol, Inhale 1 Puff as needed., Disp: 8.5 g, Rfl: 3    buPROPion (WELLBUTRIN XL) 300 MG XL tablet, Take 450 mg by mouth every morning., Disp: , Rfl:   ALLERGIES: No Known Allergies  SURGHX:   Past Surgical History:   Procedure Laterality Date    HYSTERECTOMY LAPAROSCOPY N/A 9/27/2023    Procedure: TOTAL LAPAROSCOPIC HYSTERECTOMY, BILATERAL SALPINGECTOMY, CYSTOSCOPY;  Surgeon: Kaia Mcneill M.D.;  Location: SURGERY SAME DAY [facility];  Service: Gynecology    HEMORRHOIDECTOMY  12/8/2019    Procedure: HEMORRHOIDECTOMY;  Surgeon: Manolo Shoemaker M.D.;  Location: SURGERY [facility];  Service: General    [place] REMOVE INTRAUTERINE DEVICE  7/29/2019    Procedure: REMOVAL, INTRAUTERINE DEVICE, HYSTEROSCOPY;  Surgeon: Jamil Rick M.D.;  Location: SURGERY SAME DAY [facility];  Service: Obstetrics    DILATION AND CURETTAGE N/A 7/29/2019    Procedure: DILATION AND CURETTAGE;  Surgeon: Jamil Rick M.D.;  Location: SURGERY SAME DAY [facility];  Service: Obstetrics    TONSILLECTOMY AND ADENOIDECTOMY  2013    OTHER      Turbinate reduction     SOCHX:  reports that she quit smoking about 5 months ago. Her smoking use included cigarettes. She has never used smokeless tobacco. She reports that she does not currently use alcohol. She reports that she does not currently use drugs after having used the following  "drugs: Inhaled.  FH: family history includes Diabetes in her maternal grandmother and mother; Hyperlipidemia in her maternal grandmother; No Known Problems in her father and sister; Other in her mother; Stroke in her mother.        Review of Systems   Constitutional: Negative.    Musculoskeletal:  Positive for joint pain. Negative for falls.   Neurological: Negative.        Medications, Allergies, and current problem list reviewed today in Epic             Objective     /82 (BP Location: Left arm, Patient Position: Sitting, BP Cuff Size: Large adult)   Pulse 91   Temp 37.7 °C (99.9 °F) (Temporal)   Resp 16   Ht 1.676 m (5' 6\")   Wt (!) 152 kg (335 lb 9.6 oz)   SpO2 96%   BMI 54.17 kg/m²      Physical Exam  Vitals and nursing note reviewed.   Constitutional:       General: She is not in acute distress.     Appearance: Normal appearance. She is well-developed. She is not ill-appearing, toxic-appearing or diaphoretic.   HENT:      Head: Normocephalic and atraumatic.      Right Ear: Hearing and external ear normal.      Left Ear: Hearing and external ear normal.      Nose: Nose normal.   Eyes:      General:         Right eye: No discharge.         Left eye: No discharge.      Conjunctiva/sclera: Conjunctivae normal.   Cardiovascular:      Rate and Rhythm: Normal rate and regular rhythm.      Heart sounds: Normal heart sounds.   Pulmonary:      Effort: Pulmonary effort is normal. No respiratory distress.      Breath sounds: Normal breath sounds. No wheezing.   Musculoskeletal:      Cervical back: Normal range of motion and neck supple.      Left ankle: Normal.      Left foot: Normal range of motion and normal capillary refill. Swelling, tenderness and bony tenderness present. No deformity. Normal pulse.        Feet:    Skin:     General: Skin is warm and dry.   Neurological:      General: No focal deficit present.      Mental Status: She is alert and oriented to person, place, and time.   Psychiatric:        "  Mood and Affect: Mood normal.         Behavior: Behavior normal.         Thought Content: Thought content normal.         Judgment: Judgment normal.                             Assessment & Plan        1. Injury of left foot, initial encounter  DX-FOOT-COMPLETE 3+ LEFT      2. Strain of left foot, initial encounter          X-ray negative for fracture.  RICE TREATMENT FOR EXTREMITY INJURIES:  R-rest the extremity as much as possible while pain and swelling persist  I-ice the extremity 15 minutes every 2 hours for the first 24 hours, then 4-5 times daily   C-compress the extremity either with splint or ace wrap as directed  E-elevate the extremity to help with swelling      I personally reviewed prior external notes and test results pertinent to today's visit. Return to clinic or go to ED if symptoms worsen or persist. Red flag symptoms and indications for ED discussed at length. Patient/Parent/Guardian voices understanding.  AVS with post-visit instructions provided or given verbally.  Follow-up with your primary care provider in 3-5 days. All side effects and potential interactions of prescribed medication discussed including allergic response, GI upset, tendon injury, rash, sedation, OCP effectiveness, etc.    Please note that this dictation was created using voice recognition software. I have made every reasonable attempt to correct obvious errors, but I expect that there are errors of grammar and possibly content that I did not discover before finalizing the note.

## 2024-05-25 ENCOUNTER — OFFICE VISIT (OUTPATIENT)
Dept: URGENT CARE | Facility: CLINIC | Age: 29
End: 2024-05-25
Payer: MEDICARE

## 2024-05-25 ENCOUNTER — APPOINTMENT (OUTPATIENT)
Dept: RADIOLOGY | Facility: IMAGING CENTER | Age: 29
End: 2024-05-25
Attending: PHYSICIAN ASSISTANT
Payer: MEDICARE

## 2024-05-25 VITALS
DIASTOLIC BLOOD PRESSURE: 84 MMHG | SYSTOLIC BLOOD PRESSURE: 128 MMHG | WEIGHT: 293 LBS | TEMPERATURE: 97.8 F | OXYGEN SATURATION: 99 % | HEART RATE: 65 BPM | BODY MASS INDEX: 45.99 KG/M2 | RESPIRATION RATE: 16 BRPM | HEIGHT: 67 IN

## 2024-05-25 DIAGNOSIS — S86.912A KNEE STRAIN, LEFT, INITIAL ENCOUNTER: ICD-10-CM

## 2024-05-25 DIAGNOSIS — R51.9 FREQUENT HEADACHES: ICD-10-CM
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
## 2024-05-25 DIAGNOSIS — R42 DIZZINESS: ICD-10-CM

## 2024-05-25 DIAGNOSIS — S89.92XA INJURY OF LEFT KNEE, INITIAL ENCOUNTER: ICD-10-CM

## 2024-05-25 DIAGNOSIS — S16.1XXA STRAIN OF NECK MUSCLE, INITIAL ENCOUNTER: ICD-10-CM

## 2024-05-25 PROCEDURE — 99214 OFFICE O/P EST MOD 30 MIN: CPT | Performed by: PHYSICIAN ASSISTANT

## 2024-05-25 PROCEDURE — 3079F DIAST BP 80-89 MM HG: CPT | Performed by: PHYSICIAN ASSISTANT

## 2024-05-25 PROCEDURE — 3074F SYST BP LT 130 MM HG: CPT | Performed by: PHYSICIAN ASSISTANT

## 2024-05-25 PROCEDURE — 73564 X-RAY EXAM KNEE 4 OR MORE: CPT | Mod: TC,LT | Performed by: RADIOLOGY

## 2024-05-25 RX ORDER — METHOCARBAMOL 750 MG/1
750 TABLET, FILM COATED ORAL 4 TIMES DAILY PRN
Qty: 60 TABLET | Refills: 0 | Status: SHIPPED | OUTPATIENT
Start: 2024-05-25

## 2024-05-25 RX ORDER — BREXPIPRAZOLE 1 MG/1
1 TABLET ORAL
COMMUNITY

## 2024-05-25 RX ORDER — LAMOTRIGINE 100 MG/1
TABLET ORAL
COMMUNITY

## 2024-05-25 RX ORDER — DESOGESTREL AND ETHINYL ESTRADIOL 7 DAYS X 3
KIT ORAL
COMMUNITY

## 2024-05-25 RX ORDER — METHYLPREDNISOLONE 4 MG/1
TABLET ORAL
Qty: 21 TABLET | Refills: 0 | Status: SHIPPED | OUTPATIENT
Start: 2024-05-25

## 2024-05-25 RX ORDER — CARIPRAZINE 6 MG/1
1 CAPSULE, GELATIN COATED ORAL
COMMUNITY

## 2024-05-25 ASSESSMENT — ENCOUNTER SYMPTOMS
FEVER: 0
NECK PAIN: 1
CHILLS: 0
VOMITING: 0
DIZZINESS: 1
NAUSEA: 0
TINGLING: 0
FOCAL WEAKNESS: 0
SENSORY CHANGE: 0
FALLS: 1
LOSS OF CONSCIOUSNESS: 0

## 2024-05-25 ASSESSMENT — FIBROSIS 4 INDEX: FIB4 SCORE: 0.39

## 2024-05-25 NOTE — PROGRESS NOTES
Subjective     Vanessa Sanchez is a 29 y.o. female who presents with Fall (Slipped in kitchen, L knee, neck, L ankle/foot pain, x this morning )    HPI:  Vanessa Sanchez is a 29 y.o. female who presents today for evaluation after a fall.  Patient reports that she has been experiencing some episodes of visual disturbances with dizziness for the past few weeks.  She did see an ophthalmologist yesterday who ordered some imaging and she plans to get the imaging and follow-up with them next week.  She says that she had 1 of these episodes this morning and it caused her to slip on a multi piece of ice in her kitchen.  She fell to the floor and when she fell she fell forward into her left side.  She says that she landed mostly on her left knee but also hit her left elbow and has been noticing some pain in her left foot/ankle.  She did not lose consciousness.  She says that she rested for period of time.  After few hours was noting some increasing pain.  She took 800 mg ibuprofen and 1000 mg acetaminophen around noon.  She says that she was post to host her cousins [employer]ype[employer] party this afternoon so she got up to get dressed and when she did that she noticed that she was having some moderate pain and stiffness in her neck as well.      Review of Systems   Constitutional:  Negative for chills and fever.   Gastrointestinal:  Negative for nausea and vomiting.   Musculoskeletal:  Positive for falls, joint pain and neck pain.   Neurological:  Positive for dizziness. Negative for tingling, sensory change, focal weakness and loss of consciousness.         PMH:  has a past medical history of Anxiety, Asthma, Attention deficit hyperactivity disorder (ADHD) (06/27/2019), Dental disorder, Depression, Gynecological disorder (2008), Heart burn, Hypothyroidism (06/27/2019), Indigestion, Sleep apnea, and Snoring.    She has no past medical history of Addisons disease ([facility]), Adrenal disorder ([facility]), Allergy, Anemia, Arrhythmia,  Arthritis, Blood transfusion without reported diagnosis, Cancer ([facility]), Cataract, CHF (congestive heart failure) ([facility]), Clotting disorder ([facility]), COPD (chronic obstructive pulmonary disease) ([facility]), Cushings syndrome ([facility]), Diabetes ([facility]), Diabetic neuropathy ([facility]), GERD (gastroesophageal reflux disease), Glaucoma, Goiter, Head ache, Heart attack ([facility]), Heart murmur, HIV (human immunodeficiency virus infection) ([facility]), Hyperlipidemia, Hypertension, IBD (inflammatory bowel disease), Kidney disease, Meningitis, Migraine, Muscle disorder, Osteoporosis, Parathyroid disorder ([facility]), Pituitary disease ([facility]), Pulmonary emphysema ([facility]), Seizure ([facility]), Sickle cell disease ([facility]), Stroke ([facility]), Substance abuse ([facility]), or Tuberculosis.  MEDS:   Current Outpatient Medications:     Brexpiprazole (REXULTI) 1 MG Tab, Take 1 Tablet by mouth every day., Disp: , Rfl:     desogestrel-ethinyl estradiol (VELIVET) 0.1/0.125/0.15 -0.025 MG tablet, 1 tablet Orally Once a day for 28 day(s), Disp: , Rfl:     lamoTRIgine (LAMICTAL) 100 MG Tab, TAKE 1 TABLET BY MOUTH EVERY NIGHT AT BEDTIME FOR MOOD, Disp: , Rfl:     dicyclomine (BENTYL) 20 MG Tab, Take 1 Tablet by mouth 4 times a day as needed (Abdominal cramping and diarrhea)., Disp: 60 Tablet, Rfl: 0    Cyanocobalamin (VITAMIN B-12 PO), Take  by mouth., Disp: , Rfl:     diazePAM (VALIUM) 5 MG Tab, Take 5 mg by mouth every 6 hours as needed for Anxiety., Disp: , Rfl:     Esketamine HCl (SPRAVATO, 84 MG DOSE, NA), Administer  into affected nostril(S)., Disp: , Rfl:     Vilazodone HCl 40 MG Tab, Take 40 mg by mouth every morning., Disp: , Rfl:     prazosin (MINIPRESS) 2 MG Cap, Take 2 mg by mouth every evening., Disp: , Rfl:     albuterol 108 (90 Base) MCG/ACT Aero Soln inhalation aerosol, Inhale 1 Puff as needed., Disp: 8.5 g, Rfl: 3    buPROPion (WELLBUTRIN XL) 300 MG XL tablet, Take 450 mg by mouth every morning., Disp: , Rfl:     Cariprazine HCl (VRAYLAR) 6 MG Cap, 1 Capsule. (Patient not taking:  "Reported on 5/25/2024), Disp: , Rfl:     fluconazole (DIFLUCAN) 150 MG tablet, Take one tablet orally for yeast infection, if symptoms persist, may repeat treatment after 72 hours. (Patient not taking: Reported on 5/25/2024), Disp: 2 Tablet, Rfl: 0  ALLERGIES: No Known Allergies  SURGHX:   Past Surgical History:   Procedure Laterality Date    HYSTERECTOMY LAPAROSCOPY N/A 9/27/2023    Procedure: TOTAL LAPAROSCOPIC HYSTERECTOMY, BILATERAL SALPINGECTOMY, CYSTOSCOPY;  Surgeon: Kaia Mcneill M.D.;  Location: SURGERY SAME DAY [facility];  Service: Gynecology    HEMORRHOIDECTOMY  12/8/2019    Procedure: HEMORRHOIDECTOMY;  Surgeon: Manolo Shoemaker M.D.;  Location: SURGERY [facility];  Service: General    [place] REMOVE INTRAUTERINE DEVICE  7/29/2019    Procedure: REMOVAL, INTRAUTERINE DEVICE, HYSTEROSCOPY;  Surgeon: Jamil Rick M.D.;  Location: SURGERY SAME DAY [facility];  Service: Obstetrics    DILATION AND CURETTAGE N/A 7/29/2019    Procedure: DILATION AND CURETTAGE;  Surgeon: Jamil Rick M.D.;  Location: SURGERY SAME DAY [facility];  Service: Obstetrics    TONSILLECTOMY AND ADENOIDECTOMY  2013    OTHER      Turbinate reduction     SOCHX:  reports that she quit smoking about 8 months ago. Her smoking use included cigarettes. She has never used smokeless tobacco. She reports that she does not currently use alcohol. She reports that she does not currently use drugs after having used the following drugs: Inhaled.  FH: Family history was reviewed, no pertinent findings to report      Objective     /84   Pulse 65   Temp 36.6 °C (97.8 °F)   Resp 16   Ht 1.702 m (5' 7\")   Wt (!) 152 kg (335 lb)   SpO2 99%   BMI 52.47 kg/m²      Physical Exam  Constitutional:       General: She is not in acute distress.     Appearance: She is not diaphoretic.   HENT:      Head: Normocephalic and atraumatic.      Right Ear: External ear normal.      Left Ear: External ear normal.   Eyes:      Conjunctiva/sclera: " Conjunctivae normal.      Pupils: Pupils are equal, round, and reactive to light.   Neck:      Comments: Cervical spine has full range of motion with flexion and extension.  Very mildly decreased range of motion with left and right lateral rotation.  There is no midline tenderness, step-offs, or obvious fomites noted.  There is significant tenderness and spasm noted in the right cervical paraspinous muscles and superior aspect of right trapezius muscle.  Pulmonary:      Effort: Pulmonary effort is normal. No respiratory distress.   Musculoskeletal:      Cervical back: Normal range of motion. Pain with movement and muscular tenderness present. No spinous process tenderness.      Left knee: Swelling, ecchymosis and bony tenderness present. Normal meniscus and normal patellar mobility.      Left ankle: No swelling. No tenderness. Normal range of motion.      Comments: Left ankle: with full range of motion.  No overlying erythema or ecchymosis.  No obvious soft tissue swelling.  No tenderness over the medial or lateral malleoli.  There is some mild tenderness in the foot just distal to the medial malleolus but nothing significant.  She has full range of motion but does have increased pain that is most notable with plantarflexion of the left ankle.  DP pulses are 2+ and symmetric.  Cap refill of all toes less than 2 seconds.    Left knee: There is mild soft tissue swelling in the left knee with a small superficial abrasion and some mild surrounding ecchymosis.  There is tenderness over the patella and patellar tendon as well as some mild tenderness over the MCL.  No obvious laxity.  Mildly antalgic gait.   Skin:     Findings: No rash.   Neurological:      Mental Status: She is alert and oriented to person, place, and time.   Psychiatric:         Mood and Affect: Mood and affect normal.         Cognition and Memory: Memory normal.         Judgment: Judgment normal.       DX-KNEE COMPLETE 4+ LEFT  FINDINGS:        There is  no significant joint effusion.     There is no evidence of displaced  fracture or dislocation.     There is no significant degenerative change.     IMPRESSION:  1.  Unremarkable left knee series.      *X-rays were reviewed and interpreted independently by me. I agree with the radiologist's findings     Assessment & Plan     1. Ground-level fall  - DX-KNEE COMPLETE 4+ LEFT; Future    2. Strain of neck muscle, initial encounter  - methocarbamol (ROBAXIN) 750 MG Tab; Take 1 Tablet by mouth 4 times a day as needed (moderate pain or muscle spasm).  Dispense: 60 Tablet; Refill: 0  - methylPREDNISolone (MEDROL DOSEPAK) 4 MG Tablet Therapy Pack; Follow schedule on package instructions.  Dispense: 21 Tablet; Refill: 0  Patient had no midline tenderness, step-offs, or obvious fomites noted on today's exam.  She also had full ROM with flexion and extension and only mildly decreased range of motion with lateral rotation bilaterally.  Discussed that given her current exam findings I do not feel as though x-ray of her cervical spine was warranted at this time.  Her symptoms seem most consistent with cervical strain and whiplash type injury.  Unfortunately, we were unable to give any pain medication such as Toradol in the urgent care due to patient having taken 800 mg ibuprofen and 1000 mg acetaminophen approximately 2 hours prior to arrival.  I did send over some methocarbamol and a Medrol pack for her symptoms.  She can also apply ice/heat to the affected area and work on gentle range of motion exercises.    3. Knee strain, left, initial encounter  - DX-KNEE COMPLETE 4+ LEFT; Future  X-ray did not show any significant joint effusion.  No osseous abnormality.  Patient placed in a left knee brace and referral to sports medicine has been placed for follow-up.  She says that she has some crutches at home that she will utilize if she feels as though it is needed.    4. Dizziness  - Referral to Neurology    5. Frequent headaches  -  "Referral to Neurology  Patient did see her ophthalmologist for the headaches, dizziness, and \"right eye pressure\".  She has a CT scan that she will schedule next week but ophthalmology does not think that all of her symptoms are related to her eyes.  I have therefore placed a referral to neurology for further workup of her symptoms but I also recommend that she try to see her primary care provider in the next 2 weeks for follow-up.            Differential Diagnosis, natural history, and supportive care discussed. Return to the Urgent Care or follow up with your PCP if symptoms fail to resolve, or for any new or worsening symptoms. Emergency room precautions discussed. Patient and/or family appears understanding of information.  "

## 2024-06-24 ENCOUNTER — APPOINTMENT (OUTPATIENT)
Dept: SPORTS MEDICINE | Facility: OTHER | Age: 29
End: 2024-06-24
Attending: PHYSICIAN ASSISTANT
Payer: MEDICARE

## 2024-06-25 ENCOUNTER — APPOINTMENT (OUTPATIENT)
Dept: SPORTS MEDICINE | Facility: OTHER | Age: 29
End: 2024-06-25
Attending: PHYSICIAN ASSISTANT
Payer: MEDICARE

## (undated) DEVICE — KIT  I.V. START (100EA/CA)

## (undated) DEVICE — CANISTER SUCTION RIGID RED 1500CC (40EA/CA)

## (undated) DEVICE — SET SUCTION/IRRIGATION WITH DISPOSABLE TIP (6/CA )PART #0250-070-520 IS A SUB

## (undated) DEVICE — DRESSING ABDOMINAL PAD STERILE 8 X 10" (360EA/CA)"

## (undated) DEVICE — NEEDLE INSUFFLATION FOR STEP - (12/BX)

## (undated) DEVICE — PAD SANITARY 11IN MAXI IND WRAPPED  (12EA/PK 24PK/CA)

## (undated) DEVICE — TROCAR BLADED 5 X 150MM Z THREAD (6/BX)

## (undated) DEVICE — TOWEL STOP TIMEOUT SAFETY FLAG (40EA/CA)

## (undated) DEVICE — HEAD HOLDER JUNIOR/ADULT

## (undated) DEVICE — GLOVE BIOGEL INDICATOR SZ 6.5 SURGICAL PF LTX - (50PR/BX 4BX/CA)

## (undated) DEVICE — SYSTEM CLEARIFY VISUALIZATION (10EA/PK)

## (undated) DEVICE — GLOVE BIOGEL SZ 6.5 SURGICAL PF LTX (50PR/BX 4BX/CA)

## (undated) DEVICE — SUCTION INSTRUMENT YANKAUER BULBOUS TIP W/O VENT (50EA/CA)

## (undated) DEVICE — MASK OXYGEN VNYL ADLT MED CONC WITH 7 FOOT TUBING  - (50EA/CA)

## (undated) DEVICE — GLOVE SZ 6.5 BIOGEL PI MICRO - PF LF (50PR/BX)

## (undated) DEVICE — SLEEVE, VASO, THIGH, MED

## (undated) DEVICE — SLEEVE VASO CALF MED - (10PR/CA)

## (undated) DEVICE — SET EXTENSION WITH 2 PORTS (48EA/CA) ***PART #2C8610 IS A SUBSTITUTE*****

## (undated) DEVICE — TRAY FOLEY CATHETER STATLOCK 16FR SURESTEP  (10EA/CA)

## (undated) DEVICE — Device

## (undated) DEVICE — SUTURE GENERAL

## (undated) DEVICE — SUTURE 4-0 MONOCRYL PLUS PS-2 - 27 INCH (36/BX)

## (undated) DEVICE — TROCAR 5X100 BLADED ADVANCE - FIXATION (6/BX)

## (undated) DEVICE — GOWN WARMING STANDARD FLEX - (30/CA)

## (undated) DEVICE — PROTECTOR ULNA NERVE - (36PR/CA)

## (undated) DEVICE — SUTURE 0 VICRYL PLUS CT-1 - 8 X 18 INCH (12/BX)

## (undated) DEVICE — KIT ANESTHESIA W/CIRCUIT & 3/LT BAG W/FILTER (20EA/CA)

## (undated) DEVICE — GLOVE BIOGEL INDICATOR SZ 8 SURGICAL PF LTX - (50/BX 4BX/CA)

## (undated) DEVICE — TUBING INFLOW HYSTEROSCOPY (10EA/CA)

## (undated) DEVICE — SYRINGE 10 ML CONTROL LL (25EA/BX 4BX/CA)

## (undated) DEVICE — JELLY SURGILUBE STERILE TUBE 4.25 OZ (1/EA)

## (undated) DEVICE — LEGGING LITHOTOMY 31 X 48 IN - (2EA/PK 20PK/CA)

## (undated) DEVICE — SUTURE 2-0 VICRYL PLUS CT-2 - 27 INCH (36/BX)

## (undated) DEVICE — CANNULA O2 COMFORT SOFT EAR ADULT 7 FT TUBING (50/CA)

## (undated) DEVICE — LACTATED RINGERS INJ 1000 ML - (14EA/CA 60CA/PF)

## (undated) DEVICE — BLADE SURGICAL #15 - (50/BX 3BX/CA)

## (undated) DEVICE — TUBE CONNECTING SUCTION - CLEAR PLASTIC STERILE 72 IN (50EA/CA)

## (undated) DEVICE — SODIUM CHL IRRIGATION 0.9% 1000ML (12EA/CA)

## (undated) DEVICE — SUTURE 0 VICRYL PLUS CT-1 - 36 INCH (36/BX)

## (undated) DEVICE — NEPTUNE 4 PORT MANIFOLD - (20/PK)

## (undated) DEVICE — ELECTRODE DUAL RETURN W/ CORD - (50/PK)

## (undated) DEVICE — SUTURE 3-0 VICRYL PLUS SH - 8X 18 INCH (12/BX)

## (undated) DEVICE — GLOVE BIOGEL ECLIPSE PF LATEX SIZE 8.0  (50PR/BX)

## (undated) DEVICE — TUBING OUTFLOW HYSTEROSCPY (10EA/BX)

## (undated) DEVICE — TUBING CLEARLINK DUO-VENT - C-FLO (48EA/CA)

## (undated) DEVICE — BRIEF STRETCH MATERNITY M/L - FITS 20-60IN (5EA/BG 20BG/CA)

## (undated) DEVICE — SUTURE V-LOCK 90 ABSORBABLE 3.0 CL23 ON A P14 NEEDLE (12EA/CA)

## (undated) DEVICE — DRAPE UNDER BUTTOCKS FLUID - (20/CA)

## (undated) DEVICE — SUTURE 0 VICRYL PLUS UR-6 - 27 INCH (36/BX)

## (undated) DEVICE — SPONGE GAUZESTER 4 X 4 4PLY - (128PK/CA)

## (undated) DEVICE — KIT ROOM DECONTAMINATION

## (undated) DEVICE — ELECTRODE 850 FOAM ADHESIVE - HYDROGEL RADIOTRNSPRNT (50/PK)

## (undated) DEVICE — SENSOR SPO2 NEO LNCS ADHESIVE (20/BX) SEE USER NOTES

## (undated) DEVICE — WATER IRRIGATION STERILE 1000ML (12EA/CA)

## (undated) DEVICE — DERMABOND ADVANCED - (12EA/BX)

## (undated) DEVICE — TRAY SRGPRP PVP IOD WT PRP - (20/CA)

## (undated) DEVICE — UTERINE MANIP V-CARE STANDARD DAVINCI (8EA/CA)

## (undated) DEVICE — CANISTER SUCTION 3000ML MECHANICAL FILTER AUTO SHUTOFF MEDI-VAC NONSTERILE LF DISP  (40EA/CA)

## (undated) DEVICE — TROCAR FIOS OPTICAL ACCESS SYSTEM 5 X 150MM (6/BX)

## (undated) DEVICE — VESSEL DIVIDER SEAL LAP LIGASURE 37CM (6EA/BX)

## (undated) DEVICE — TROCAR 5X100 SEPARTATOR ADV - FIXATION (6/BX)

## (undated) DEVICE — SET LEADWIRE 5 LEAD BEDSIDE DISPOSABLE ECG (1SET OF 5/EA)

## (undated) DEVICE — PAD BABY LAP 4X18 W/O - RINGS PREWASHED 5/PK 40PK/CS

## (undated) DEVICE — SUTURE PASSER 14GA DISPOSABLE

## (undated) DEVICE — CATHETER IV 20 GA X 1-1/4 ---SURG.& SDS ONLY--- (50EA/BX)

## (undated) DEVICE — MASK ANESTHESIA ADULT  - (100/CA)

## (undated) DEVICE — CHLORAPREP 26 ML APPLICATOR - ORANGE TINT(25/CA)

## (undated) DEVICE — DRAPE LAPAROTOMY T SHEET - (12EA/CA)

## (undated) DEVICE — DRAPE UNDER BUTTOCK LRG (40/CA)

## (undated) DEVICE — SENSOR OXIMETER ADULT SPO2 RD SET (20EA/BX)

## (undated) DEVICE — SUTURE 1 6-18IN COATED VICRYL - PLUS VIO TIES(12PK/BX)

## (undated) DEVICE — DRAPESURG STERI-DRAPE LONG - (10/BX 4BX/CA)

## (undated) DEVICE — PACK MINOR BASIN - (2EA/CA)

## (undated) DEVICE — ELECTRODE 5MM LHK LAPSCP STERILE DISP- MEGADYNE  (5/CA)

## (undated) DEVICE — DRAPE VAGINAL BIB W/ POUCH (10EA/CA)

## (undated) DEVICE — LIGASURE LAPAROSCOPIC 5MM - (6EA/CA)

## (undated) DEVICE — TUBING LAPAROSCOPIC PLUME DEVICE (10EA/CA)